# Patient Record
Sex: MALE | Race: WHITE | NOT HISPANIC OR LATINO | Employment: OTHER | URBAN - METROPOLITAN AREA
[De-identification: names, ages, dates, MRNs, and addresses within clinical notes are randomized per-mention and may not be internally consistent; named-entity substitution may affect disease eponyms.]

---

## 2017-03-09 ENCOUNTER — OFFICE VISIT (OUTPATIENT)
Dept: INTERNAL MEDICINE | Facility: CLINIC | Age: 62
End: 2017-03-09

## 2017-03-09 VITALS
BODY MASS INDEX: 30.92 KG/M2 | WEIGHT: 197 LBS | DIASTOLIC BLOOD PRESSURE: 68 MMHG | HEIGHT: 67 IN | SYSTOLIC BLOOD PRESSURE: 142 MMHG

## 2017-03-09 DIAGNOSIS — M65.342 TRIGGER RING FINGER OF LEFT HAND: Primary | ICD-10-CM

## 2017-03-09 LAB
BASOPHILS # BLD AUTO: 0.05 10*3/MM3 (ref 0–0.2)
BASOPHILS NFR BLD AUTO: 0.8 % (ref 0–1)
DEPRECATED RDW RBC AUTO: 42.1 FL (ref 37–54)
EOSINOPHIL # BLD AUTO: 0.08 10*3/MM3 (ref 0.1–0.3)
EOSINOPHIL NFR BLD AUTO: 1.3 % (ref 0–3)
ERYTHROCYTE [DISTWIDTH] IN BLOOD BY AUTOMATED COUNT: 12.4 % (ref 11.3–14.5)
HCT VFR BLD AUTO: 39 % (ref 38.9–50.9)
HGB BLD-MCNC: 13.5 G/DL (ref 13.1–17.5)
IMM GRANULOCYTES # BLD: 0.02 10*3/MM3 (ref 0–0.03)
IMM GRANULOCYTES NFR BLD: 0.3 % (ref 0–0.6)
LYMPHOCYTES # BLD AUTO: 2.11 10*3/MM3 (ref 0.6–4.8)
LYMPHOCYTES NFR BLD AUTO: 34.3 % (ref 24–44)
MCH RBC QN AUTO: 32.8 PG (ref 27–31)
MCHC RBC AUTO-ENTMCNC: 34.6 G/DL (ref 32–36)
MCV RBC AUTO: 94.9 FL (ref 80–99)
MONOCYTES # BLD AUTO: 0.57 10*3/MM3 (ref 0–1)
MONOCYTES NFR BLD AUTO: 9.3 % (ref 0–12)
NEUTROPHILS # BLD AUTO: 3.32 10*3/MM3 (ref 1.5–8.3)
NEUTROPHILS NFR BLD AUTO: 54 % (ref 41–71)
PLATELET # BLD AUTO: 244 10*3/MM3 (ref 150–450)
PMV BLD AUTO: 9.8 FL (ref 6–12)
RBC # BLD AUTO: 4.11 10*6/MM3 (ref 4.2–5.76)
WBC NRBC COR # BLD: 6.15 10*3/MM3 (ref 3.5–10.8)

## 2017-03-09 PROCEDURE — 85025 COMPLETE CBC W/AUTO DIFF WBC: CPT | Performed by: PHYSICIAN ASSISTANT

## 2017-03-09 PROCEDURE — 99213 OFFICE O/P EST LOW 20 MIN: CPT | Performed by: PHYSICIAN ASSISTANT

## 2017-03-09 NOTE — PROGRESS NOTES
"Chief Complaint   Patient presents with   • Trigger finger left ring finger ongoing       Subjective   Russell Maxwell Soto is a 62 y.o. male.       History of Present Illness     Pt has a trigger finger on his left hand that seems to be worsening over the past few months, has been present for years. He notes that it cleared up when he was on IV abx. Wonders if he has some sort of systemic infection. No fever, sweats, chills, dizziness, pain otherwise. Would like to pursue options other than surgery for treatment of his finger, if possible.    Never heard about results from his holter monitor, done after hospital admission last year.    Has f/u with urologist pending soon.      No current outpatient prescriptions on file.     PMFSH  The following portions of the patient's history were reviewed and updated as appropriate: allergies, current medications, past family history, past medical history, past social history, past surgical history and problem list.    Review of Systems   Constitutional: Negative for activity change, chills, diaphoresis, fatigue, fever and unexpected weight change.   HENT: Negative.    Eyes: Negative.    Respiratory: Negative for chest tightness, shortness of breath and wheezing.    Cardiovascular: Negative.    Gastrointestinal: Negative for abdominal pain.   Endocrine: Negative.    Genitourinary: Negative.    Musculoskeletal: Positive for arthralgias.   Skin: Negative for color change, rash and wound.   Allergic/Immunologic: Negative.    Neurological: Negative for dizziness, seizures, syncope and light-headedness.   Hematological: Negative for adenopathy. Does not bruise/bleed easily.   Psychiatric/Behavioral: Negative for confusion.       Objective   Visit Vitals   • /68   • Ht 67\" (170.2 cm)   • Wt 197 lb (89.4 kg)   • BMI 30.85 kg/m2       Physical Exam   Constitutional: He appears well-developed and well-nourished.   HENT:   Head: Normocephalic.   Right Ear: Hearing, tympanic " membrane, external ear and ear canal normal.   Left Ear: Hearing, tympanic membrane, external ear and ear canal normal.   Nose: Nose normal.   Mouth/Throat: Oropharynx is clear and moist.   Eyes: Conjunctivae are normal. Pupils are equal, round, and reactive to light.   Neck: Normal range of motion.   Cardiovascular: Normal rate, regular rhythm and normal heart sounds.    Pulmonary/Chest: Effort normal and breath sounds normal. He has no decreased breath sounds. He has no wheezes. He has no rhonchi. He has no rales.   Musculoskeletal:        Left hand: He exhibits decreased range of motion (incomplete flexion of left ring finger), tenderness (PIP jt of left ring finger) and swelling. He exhibits no deformity and no laceration.   Neurological: He is alert.   Skin: Skin is warm and dry.   Psychiatric: He has a normal mood and affect. His behavior is normal.   Nursing note and vitals reviewed.      Results for orders placed or performed in visit on 07/25/16   POCT Urinalysis Dipstick, Automated   Result Value Ref Range    Color Yellow Yellow, Straw, Dark Yellow, Leatha    Clarity, UA Clear Clear    Glucose, UA Negative Negative mg/dL    Bilirubin Negative Negative    Ketones, UA Negative Negative    Specific Gravity  1.010 1.005 - 1.030    Blood, UA Negative Negative    pH, Urine 8.0 5.0 - 8.0    Protein, POC Negative Negative mg/dL    Urobilinogen, UA Normal Normal    Leukocytes Negative Negative    Nitrite, UA Negative Negative        ASSESSMENT/PLAN    Problem List Items Addressed This Visit     None      Visit Diagnoses     Trigger ring finger of left hand    -  Primary    Relevant Orders    CBC & Differential    Ambulatory Referral to Physical Therapy Evaluate and treat    CBC Auto Differential               Return in about 4 months (around 7/9/2017) for Annual physical.

## 2017-06-20 ENCOUNTER — OFFICE VISIT (OUTPATIENT)
Dept: INTERNAL MEDICINE | Facility: CLINIC | Age: 62
End: 2017-06-20

## 2017-06-20 VITALS
HEART RATE: 71 BPM | BODY MASS INDEX: 29.13 KG/M2 | OXYGEN SATURATION: 95 % | DIASTOLIC BLOOD PRESSURE: 70 MMHG | SYSTOLIC BLOOD PRESSURE: 146 MMHG | TEMPERATURE: 99 F | WEIGHT: 186 LBS

## 2017-06-20 DIAGNOSIS — J20.9 ACUTE BRONCHITIS, UNSPECIFIED ORGANISM: Primary | ICD-10-CM

## 2017-06-20 LAB
EXPIRATION DATE: NORMAL
FLUAV AG NPH QL: NEGATIVE
FLUBV AG NPH QL: NEGATIVE
INTERNAL CONTROL: NORMAL
Lab: NORMAL

## 2017-06-20 PROCEDURE — 87804 INFLUENZA ASSAY W/OPTIC: CPT | Performed by: PHYSICIAN ASSISTANT

## 2017-06-20 PROCEDURE — 99213 OFFICE O/P EST LOW 20 MIN: CPT | Performed by: PHYSICIAN ASSISTANT

## 2017-06-20 RX ORDER — BENZONATATE 200 MG/1
200 CAPSULE ORAL 3 TIMES DAILY PRN
Qty: 30 CAPSULE | Refills: 0 | Status: SHIPPED | OUTPATIENT
Start: 2017-06-20 | End: 2017-06-30

## 2017-06-20 RX ORDER — AZITHROMYCIN 250 MG/1
TABLET, FILM COATED ORAL
Qty: 6 TABLET | Refills: 0 | Status: SHIPPED | OUTPATIENT
Start: 2017-06-20 | End: 2017-08-04

## 2017-06-20 NOTE — PROGRESS NOTES
Chief Complaint   Patient presents with   • Fever, cough, congestion x1 week       Subjective   Russell Maxwell Soto is a 62 y.o. male.       History of Present Illness     Started feeling bad a week ago with ear pain and then progressed to tickled in his throat. Developed unproductive cough that was persistent. Continued with his normal activities. Started checking his temp 3 days ago and it stayed around 100, tmax was 100.8. Taking tylenol and it is bring temp down. Currently he still has cough, sinus congestion, ear fullness, headaches and fatigue. No sick contacts.        Current Outpatient Prescriptions:   •  azithromycin (ZITHROMAX Z-BASIL) 250 MG tablet, Take 2 tablets the first day, then 1 tablet daily for 4 days., Disp: 6 tablet, Rfl: 0  •  benzonatate (TESSALON) 200 MG capsule, Take 1 capsule by mouth 3 (Three) Times a Day As Needed for Cough for up to 10 days., Disp: 30 capsule, Rfl: 0     PMFSH  The following portions of the patient's history were reviewed and updated as appropriate: allergies, current medications, past family history, past medical history, past social history, past surgical history and problem list.    Review of Systems   Constitutional: Positive for fatigue. Negative for activity change and unexpected weight change.   HENT: Positive for congestion, postnasal drip and sore throat. Negative for ear pain.    Eyes: Negative for pain and discharge.   Respiratory: Positive for cough. Negative for chest tightness, shortness of breath and wheezing.    Cardiovascular: Negative for chest pain and palpitations.   Gastrointestinal: Negative for abdominal pain, diarrhea and vomiting.   Endocrine: Negative.    Genitourinary: Negative.    Musculoskeletal: Negative for joint swelling.   Skin: Negative for color change, rash and wound.   Allergic/Immunologic: Negative.    Neurological: Negative for seizures and syncope.   Psychiatric/Behavioral: Negative.        Objective   /70  Pulse 71  Temp 99  °F (37.2 °C)  Wt 186 lb (84.4 kg)  SpO2 95%  BMI 29.13 kg/m2    Physical Exam   Constitutional: He is oriented to person, place, and time. He appears well-developed and well-nourished.  Non-toxic appearance. No distress.   HENT:   Head: Normocephalic and atraumatic. Hair is normal.   Right Ear: External ear normal. No drainage, swelling or tenderness. Tympanic membrane is bulging.   Left Ear: External ear normal. No drainage, swelling or tenderness. Tympanic membrane is bulging.   Nose: Mucosal edema present. No epistaxis.   Mouth/Throat: Uvula is midline and mucous membranes are normal. No oral lesions. No uvula swelling. Posterior oropharyngeal erythema present. No oropharyngeal exudate.   Eyes: Conjunctivae and EOM are normal. Pupils are equal, round, and reactive to light. Right eye exhibits no discharge. Left eye exhibits no discharge. No scleral icterus.   Neck: Normal range of motion. Neck supple.   Cardiovascular: Normal rate, regular rhythm and normal heart sounds.  Exam reveals no gallop.    No murmur heard.  Pulmonary/Chest: Breath sounds normal. No stridor. No respiratory distress. He has no wheezes. He has no rales. He exhibits no tenderness.   Abdominal: Soft. There is no tenderness.   Lymphadenopathy:     He has cervical adenopathy.   Neurological: He is alert and oriented to person, place, and time. He exhibits normal muscle tone.   Skin: Skin is warm and dry. No rash noted. He is not diaphoretic.   Psychiatric: He has a normal mood and affect. His behavior is normal. Judgment and thought content normal.   Nursing note and vitals reviewed.      Results for orders placed or performed in visit on 06/20/17   POCT Influenza A/B   Result Value Ref Range    Rapid Influenza A Ag negative     Rapid Influenza B Ag negative     Internal Control Passed Passed    Lot Number [id number]     Expiration Date 02/01/2019         ASSESSMENT/PLAN    Problem List Items Addressed This Visit     None      Visit Diagnoses      Acute bronchitis, unspecified organism    -  Primary    Take z-pack as directed. Use tessalon perles prn. Increase rest and fluids. RTC if worsening or no better.    Relevant Medications    azithromycin (ZITHROMAX Z-BASIL) 250 MG tablet    benzonatate (TESSALON) 200 MG capsule    Other Relevant Orders    POCT Influenza A/B (Completed)               Return in about 3 months (around 9/20/2017) for Annual physical.

## 2017-08-04 ENCOUNTER — OFFICE VISIT (OUTPATIENT)
Dept: INTERNAL MEDICINE | Facility: CLINIC | Age: 62
End: 2017-08-04

## 2017-08-04 VITALS
DIASTOLIC BLOOD PRESSURE: 68 MMHG | HEART RATE: 53 BPM | BODY MASS INDEX: 28.98 KG/M2 | OXYGEN SATURATION: 98 % | WEIGHT: 185 LBS | SYSTOLIC BLOOD PRESSURE: 142 MMHG

## 2017-08-04 DIAGNOSIS — M54.42 ACUTE BILATERAL LOW BACK PAIN WITH BILATERAL SCIATICA: Primary | ICD-10-CM

## 2017-08-04 DIAGNOSIS — M54.41 ACUTE BILATERAL LOW BACK PAIN WITH BILATERAL SCIATICA: Primary | ICD-10-CM

## 2017-08-04 DIAGNOSIS — Z00.00 HEALTHCARE MAINTENANCE: ICD-10-CM

## 2017-08-04 LAB
25(OH)D3 SERPL-MCNC: 12.3 NG/ML
ALBUMIN SERPL-MCNC: 4.5 G/DL (ref 3.2–4.8)
ALBUMIN/GLOB SERPL: 1.7 G/DL (ref 1.5–2.5)
ALP SERPL-CCNC: 77 U/L (ref 25–100)
ALT SERPL W P-5'-P-CCNC: 22 U/L (ref 7–40)
ANION GAP SERPL CALCULATED.3IONS-SCNC: 10 MMOL/L (ref 3–11)
ARTICHOKE IGE QN: 147 MG/DL (ref 0–130)
AST SERPL-CCNC: 23 U/L (ref 0–33)
BILIRUB SERPL-MCNC: 0.7 MG/DL (ref 0.3–1.2)
BUN BLD-MCNC: 11 MG/DL (ref 9–23)
BUN/CREAT SERPL: 15.7 (ref 7–25)
CALCIUM SPEC-SCNC: 9.6 MG/DL (ref 8.7–10.4)
CHLORIDE SERPL-SCNC: 103 MMOL/L (ref 99–109)
CHOLEST SERPL-MCNC: 242 MG/DL (ref 0–200)
CO2 SERPL-SCNC: 26 MMOL/L (ref 20–31)
CREAT BLD-MCNC: 0.7 MG/DL (ref 0.6–1.3)
DEPRECATED RDW RBC AUTO: 41.9 FL (ref 37–54)
ERYTHROCYTE [DISTWIDTH] IN BLOOD BY AUTOMATED COUNT: 12.4 % (ref 11.3–14.5)
GFR SERPL CREATININE-BSD FRML MDRD: 114 ML/MIN/1.73
GLOBULIN UR ELPH-MCNC: 2.7 GM/DL
GLUCOSE BLD-MCNC: 111 MG/DL (ref 70–100)
HCT VFR BLD AUTO: 39.2 % (ref 38.9–50.9)
HCV AB SER DONR QL: NORMAL
HDLC SERPL-MCNC: 62 MG/DL (ref 40–60)
HGB BLD-MCNC: 13.7 G/DL (ref 13.1–17.5)
MCH RBC QN AUTO: 32.5 PG (ref 27–31)
MCHC RBC AUTO-ENTMCNC: 34.9 G/DL (ref 32–36)
MCV RBC AUTO: 93.1 FL (ref 80–99)
PLATELET # BLD AUTO: 239 10*3/MM3 (ref 150–450)
PMV BLD AUTO: 9.4 FL (ref 6–12)
POTASSIUM BLD-SCNC: 4.3 MMOL/L (ref 3.5–5.5)
PROT SERPL-MCNC: 7.2 G/DL (ref 5.7–8.2)
PSA SERPL-MCNC: 0.12 NG/ML (ref 0–4)
RBC # BLD AUTO: 4.21 10*6/MM3 (ref 4.2–5.76)
SODIUM BLD-SCNC: 139 MMOL/L (ref 132–146)
TRIGL SERPL-MCNC: 314 MG/DL (ref 0–150)
TSH SERPL DL<=0.05 MIU/L-ACNC: 2.1 MIU/ML (ref 0.35–5.35)
WBC NRBC COR # BLD: 5.32 10*3/MM3 (ref 3.5–10.8)

## 2017-08-04 PROCEDURE — 86803 HEPATITIS C AB TEST: CPT | Performed by: PHYSICIAN ASSISTANT

## 2017-08-04 PROCEDURE — 85027 COMPLETE CBC AUTOMATED: CPT | Performed by: PHYSICIAN ASSISTANT

## 2017-08-04 PROCEDURE — 80053 COMPREHEN METABOLIC PANEL: CPT | Performed by: PHYSICIAN ASSISTANT

## 2017-08-04 PROCEDURE — 82306 VITAMIN D 25 HYDROXY: CPT | Performed by: PHYSICIAN ASSISTANT

## 2017-08-04 PROCEDURE — 84443 ASSAY THYROID STIM HORMONE: CPT | Performed by: PHYSICIAN ASSISTANT

## 2017-08-04 PROCEDURE — 99213 OFFICE O/P EST LOW 20 MIN: CPT | Performed by: PHYSICIAN ASSISTANT

## 2017-08-04 PROCEDURE — 80061 LIPID PANEL: CPT | Performed by: PHYSICIAN ASSISTANT

## 2017-08-04 PROCEDURE — 84153 ASSAY OF PSA TOTAL: CPT | Performed by: PHYSICIAN ASSISTANT

## 2017-08-04 RX ORDER — IBUPROFEN 400 MG/1
400 TABLET ORAL 3 TIMES DAILY
COMMUNITY
End: 2017-11-30

## 2017-08-04 NOTE — PROGRESS NOTES
"Chief Complaint   Patient presents with   • Back pain x2 months     pt is fasting wants labwork prior to PE in 2 weeks       Subjective   Russell Soto is a 62 y.o. male.       History of Present Illness     In the beginning of June pt did some yard work that required some bending and pulling. He developed some low back pain, he let that subside and then was able to go back and do some more work. The pain is an \"electric\" kind of pain that goes between his buttocks and legs. Shifts back and forth between right and left. Usually is in the left side. Last time he did yardwork was in July. Since then he has been taking ibuprofen 400 mg prn but feels like his discomfort is getting worse. Can be triggered by even slight shift of posture- different every time. Can find a comfortable position and stay in the same spot without pain. Pain has radiated down his leg in the past, less now. No numbness or tingling.       Current Outpatient Prescriptions:   •  ibuprofen (ADVIL,MOTRIN) 400 MG tablet, Take 400 mg by mouth Every 6 (Six) Hours As Needed for Mild Pain (1-3)., Disp: , Rfl:      PMFSH  The following portions of the patient's history were reviewed and updated as appropriate: allergies, current medications, past family history, past medical history, past social history, past surgical history and problem list.    Review of Systems   Constitutional: Negative for appetite change, fever and unexpected weight change.   HENT: Negative.    Eyes: Negative for pain and visual disturbance.   Respiratory: Negative for chest tightness, shortness of breath and wheezing.    Cardiovascular: Negative for chest pain and palpitations.   Gastrointestinal: Negative for abdominal pain, blood in stool, diarrhea, nausea and vomiting.   Endocrine: Negative.    Genitourinary: Negative for difficulty urinating, flank pain, frequency and urgency.   Musculoskeletal: Positive for back pain. Negative for joint swelling.   Skin: Negative for " color change and rash.   Neurological: Negative for tremors and weakness.   Hematological: Negative for adenopathy.   Psychiatric/Behavioral: Negative for confusion and decreased concentration.   All other systems reviewed and are negative.      Objective   /68  Pulse 53  Wt 185 lb (83.9 kg)  SpO2 98%  BMI 28.98 kg/m2    Physical Exam   Constitutional: He is oriented to person, place, and time. He appears well-developed and well-nourished. No distress.   HENT:   Head: Normocephalic and atraumatic.   Eyes: Conjunctivae and EOM are normal. Pupils are equal, round, and reactive to light. No scleral icterus.   Neck: Normal range of motion. Neck supple.   Cardiovascular: Normal rate, regular rhythm and normal heart sounds.  Exam reveals no gallop.    No murmur heard.  Pulmonary/Chest: Effort normal and breath sounds normal. No respiratory distress. He has no wheezes. He has no rales. He exhibits no tenderness.   Abdominal: Soft. There is no tenderness.   Musculoskeletal: He exhibits tenderness. He exhibits no deformity.   Neurological: He is alert and oriented to person, place, and time. He has normal reflexes. He displays no atrophy, no tremor and normal reflexes. No sensory deficit. He exhibits normal muscle tone. Coordination normal.   Reflex Scores:       Patellar reflexes are 2+ on the right side and 2+ on the left side.       Achilles reflexes are 2+ on the right side and 2+ on the left side.  Skin: Skin is warm and dry. No rash noted. He is not diaphoretic.   Psychiatric: He has a normal mood and affect. His behavior is normal. Judgment and thought content normal.   Nursing note and vitals reviewed.           ASSESSMENT/PLAN    Problem List Items Addressed This Visit        Other    Acute bilateral low back pain with bilateral sciatica - Primary     Refer for PT. Start Ibuprofen 400 mg po BID for 5-7 days.          Relevant Orders    Ambulatory Referral to Physical Therapy Evaluate and treat      Other  Visit Diagnoses     Healthcare maintenance        Relevant Orders    Lipid Panel    Comprehensive Metabolic Panel    CBC (No Diff)    TSH    Vitamin D 25 Hydroxy    Hepatitis C Antibody    PSA               Return for Next scheduled follow up.

## 2017-08-08 ENCOUNTER — HOSPITAL ENCOUNTER (OUTPATIENT)
Dept: PHYSICAL THERAPY | Facility: HOSPITAL | Age: 62
Setting detail: THERAPIES SERIES
Discharge: HOME | End: 2017-08-08

## 2017-08-08 DIAGNOSIS — M54.42 ACUTE LEFT-SIDED LOW BACK PAIN WITH LEFT-SIDED SCIATICA: Primary | ICD-10-CM

## 2017-08-08 PROCEDURE — 97162 PT EVAL MOD COMPLEX 30 MIN: CPT | Performed by: PHYSICAL THERAPIST

## 2017-08-08 NOTE — THERAPY EVALUATION
Outpatient Physical Therapy Ortho Initial Evaluation  [facility]     Patient Name: Russell Soto  : 1955  MRN: [id number]  Today's Date: 2017      Visit Date: 2017    Patient Active Problem List   Diagnosis   • Sinus bradycardia   • BPH (benign prostatic hypertrophy)   • Anemia   • Palpitations   • Health care maintenance   • Hyperlipidemia   • Acute bilateral low back pain with bilateral sciatica        Past Medical History:   Diagnosis Date   • Dizziness    • Rectal hemorrhage         Past Surgical History:   Procedure Laterality Date   • PROSTATE SURGERY  2016       Visit Dx:     ICD-10-CM ICD-9-CM   1. Acute left-sided low back pain with left-sided sciatica M54.42 724.2     724.3             Patient History       17 1300          History    Chief Complaint Difficulty with daily activities;Pain;Muscle weakness  -RB      Type of Pain Back pain;Lower Extremity / Leg  -RB      Brief Description of Current Complaint Pt developed bilateral low back and BLE pn in  of this year, 1 day after doing yardwork. Pn initially across the low back bilaterally and radiating into BLE. Pn has since improved somewhat. Now intermittent, present mostly in the L low back and only occasionally radiating down the LLE. Pn seems random, but does tend to increased when shifting his weight from the R leg to the L and when changing positions. He is restless and has difficulty finding comfortable positions, but does state that laying on his R side w/ knees bent gives him some relief.   -RB      Previous treatment for THIS PROBLEM Medication  -RB      Onset Date- PT 2017  -RB      Patient/Caregiver Goals Relieve pain;Return to prior level of function;Improve mobility;Know what to do to help the symptoms  -RB      Current Tobacco Use None  -RB      Patient's Rating of General Health Very good  -RB      Hand Dominance right-handed  -RB      Occupation/sports/leisure activities Retired. Enjoys yardwork  but has avoided 2/2 concern of worsening injury.  -RB      Patient seeing anyone else for problem(s)? Yes  -RB      How has patient tried to help current problem? Rest  -RB      What clinical tests have you had for this problem? --   None  -RB      Pain     Pain Location Back  -RB      Pain at Present 0  -RB      Pain at Best 0  -RB      Pain at Worst 9  -RB      Pain Frequency Intermittent  -RB      Pain Description Shooting;Other (Comment)   electric  -RB      What Performance Factors Make the Current Problem(s) WORSE? Weightshifting, walking  -RB      What Performance Factors Make the Current Problem(s) BETTER? Laying on R side  -RB      Tolerance Time- Standing 30 min  -RB      Tolerance Time- Walking 5-10 min  -RB      What position do you sleep in? Right sidelying  -RB      Fall Risk Assessment    Any falls in the past year: No  -RB      Daily Activities    Primary Language English  -RB      How does patient learn best? Reading  -RB      Teaching needs identified Home Exercise Program;Management of Condition  -RB      Patient is concerned about/has problems with Climbing Stairs;Difficulty with self care (i.e. bathing, dressing, toileting:;Grasping objects lifting;Performing home management (household chores, shopping, care of dependents);Performing job responsibilities/community activities (work, school,;Standing;Walking  -RB      Does patient have problems with the following? None  -RB      Barriers to learning None  -RB      Pt Participated in POC and Goals Yes  -RB      Safety    Are you being hurt, hit, or frightened by anyone at home or in your life? No  -RB      Are you being neglected by a caregiver No  -RB        User Key  (r) = Recorded By, (t) = Taken By, (c) = Cosigned By    Initials Name Provider Type    RB Daniela Gregory, PT Physical Therapist                PT Ortho       08/08/17 1300    Subjective Comments    Subjective Comments Presents w/ 2 month hx of L low back, LLE pn.  -RB     Posture/Observations    Posture- WNL Posture is WNL  -RB    Posture/Observations Comments Ambulates w/ slight R lateral lean. Demo posterior rotation L inominate in supine, equal leg length.  -RB    Quarter Clearing    Quarter Clearing Lower Quarter Clearing  -RB    DTR- Lower Quarter Clearing    Patellar tendon (L2-4) Bilateral:;1- Minimal response  -RB    Achilles tendon (S1-2) Bilateral:;1- Minimal response  -RB    Neural Tension Signs- Lower Quarter Clearing    Slump Negative  -RB    SLR Negative  -RB    Sensory Screen for Light Touch- Lower Quarter Clearing    L1 (inguinal area) Intact  -RB    L2 (anterior mid thigh) Intact  -RB    L3 (distal anterior thigh) Intact  -RB    L4 (medial lower leg/foot) Intact  -RB    L5 (lateral lower leg/great toe) Intact  -RB    S1 (bottom of foot) Intact  -RB    Myotomal Screen- Lower Quarter Clearing    Hip flexion (L2) Bilateral:;4+ (Good +)   pn L lumbar w/ resisted R hip flxn  -RB    Knee extension (L3) Bilateral:;5 (Normal)  -RB    Ankle DF (L4) Bilateral:;5 (Normal)  -RB    Great toe extension (L5) Bilateral:;5 (Normal)  -RB    Ankle PF (S1) Bilateral:;5 (Normal)  -RB    Knee flexion (S2) Bilateral:;5 (Normal)  -RB    Lumbar ROM Screen- Lower Quarter Clearing    Lumbar Flexion Normal   no change  -RB    Lumbar Extension Impaired   50%, uncomfortable  -RB    Lumbar Lateral Flexion Normal  -RB    Lumbar Rotation Normal  -RB    SI/Hip Screen- Lower Quarter Clearing    Gaenslen's test Left:;Positive  -RB    ASIS compression Left:;Positive  -RB    ASIS distraction Left:;Positive  -RB    La's/Harry's test Negative  -RB    Special Tests/Palpation    Special Tests/Palpation Lumbar/SI  -RB    Lumbosacral Palpation    SI Left:;Tender  -RB    Lumbosacral Segment Left:;Tender  -RB    Piriformis Left:;Tender  -RB    Lumbosacral Palpation? Yes  -RB    Lumbar/SI Special Tests    FAIR Test (Piriformis Syndrome) Negative  -RB    ROM (Range of Motion)    General ROM Detail B hip AROM  WFL and pn free  -RB    Flexibility    Flexibility Tested? Lower Extremity  -RB    Lower Extremity Flexibility    Hamstrings Bilateral:;Mildly limited  -RB    ITB Bilateral:;WNL  -RB    Hip External Rotators Bilateral:;WNL  -RB      User Key  (r) = Recorded By, (t) = Taken By, (c) = Cosigned By    Initials Name Provider Type    GAEL Gregory, PT Physical Therapist                            Therapy Education       08/08/17 1737          Therapy Education    Given HEP   Issued MET for inominate rotation, adductor squeezes  -RB      Program New  -RB      How Provided Verbal;Demonstration;Written  -RB      Provided to Patient  -RB      Level of Understanding Teach back education performed  -RB        User Key  (r) = Recorded By, (t) = Taken By, (c) = Cosigned By    Initials Name Provider Type    GAEL Gregory, PT Physical Therapist                PT OP Goals       08/08/17 1700       PT Short Term Goals    STG Date to Achieve 08/29/17  -RB     STG 1 Pt to be compliant w/ initial HEP for strengthening and flexibility.  -RB     STG 1 Progress New  -RB     STG 2 Pt to report at least a 25% reduction in pn  -RB     STG 2 Progress New  -RB     STG 3 Pt to report no return of LE symptoms w/ daily activities.  -RB     STG 3 Progress New  -RB     Long Term Goals    LTG Date to Achieve 09/19/17  -RB     LTG 1 Pt to be independent w/ long term HEP for strengthening, flexibility, and symptom mgmt  -RB     LTG 1 Progress New  -RB     LTG 2 Pt to report at least a 50% reduction in pn  -RB     LTG 2 Progress New  -RB     LTG 3 Pt to demo functional trunk ROM in all planes w/o increase in pn.  -RB     LTG 3 Progress New  -RB     LTG 4 Pt to improve Mod Oswestry score by at least 8 pts to reflect improved pn and function.  -RB     LTG 4 Progress New  -RB     Time Calculation    PT Goal Re-Cert Due Date 09/05/17  -RB       User Key  (r) = Recorded By, (t) = Taken By, (c) = Cosigned By    Initials Name Provider Type    GAEL Suarez  MARY Gregory PT Physical Therapist                PT Assessment/Plan       08/08/17 0766       PT Assessment    Functional Limitations Impaired locomotion;Limitation in home management;Limitations in functional capacity and performance;Performance in leisure activities;Performance in self-care ADL  -RB     Impairments Muscle strength;Pain;Impaired postural alignment;Range of motion  -RB     Assessment Comments Pt presents w/ evolving symptoms of L SI joint dysfunction. He demonstrates deficits in B hip strength, trunk ROM, and pelvic alignment limiting his tolerance to daily activities. LE symptoms not reproduced w/ neural tension testing, repeated movements, or lumbar mobilization. However SI provocation tests elicited lumbosacral pn. He would benefit from skilled PT services to address deficits, decrease pn, and allow return to PLOF.  -RB     Please refer to paper survey for additional self-reported information Yes  -RB     Rehab Potential Good  -RB     Patient/caregiver participated in establishment of treatment plan and goals Yes  -RB     Patient would benefit from skilled therapy intervention Yes  -RB     PT Plan    PT Frequency 2x/week  -RB     Predicted Duration of Therapy Intervention (days/wks) 4 wks  -RB     Planned CPT's? PT EVAL MOD COMPLELITY: 42392;PT RE-EVAL: 51178;PT THER PROC EA 15 MIN: 41952;PT MANUAL THERAPY EA 15 MIN: 31266;PT NEUROMUSC RE-EDUCATION EA 15 MIN: 51350;PT ULTRASOUND EA 15 MIN: 20271;PT ELECTRICAL STIM UNATTEND: ;PT HOT/COLD PACK WC NONMCARE: 07594;PT TRACTION LUMBAR: 28096  -RB     PT Plan Comments PT POC to include hip/core strengthening, manual techniques/MET to improve postural alignment, modalities as indicated for pn control.  -RB       User Key  (r) = Recorded By, (t) = Taken By, (c) = Cosigned By    Initials Name Provider Type    RB Daniela Gregory, PT Physical Therapist                  Exercises       08/08/17 1300          Subjective Comments    Subjective Comments  Presents w/ 2 month hx of L low back, LLE pn.  -RB        User Key  (r) = Recorded By, (t) = Taken By, (c) = Cosigned By    Initials Name Provider Type    GAEL Gregory, PT Physical Therapist                              Outcome Measures       08/08/17 1700          Modified Oswestry    Modified Oswestry Score/Comments 22/50  -RB      Functional Assessment    Outcome Measure Options Modifed Owestry  -RB        User Key  (r) = Recorded By, (t) = Taken By, (c) = Cosigned By    Initials Name Provider Type    GAEL Gregory, PT Physical Therapist            Time Calculation:   Start Time: 1345     Therapy Charges for Today     Code Description Service Date Service Provider Modifiers Qty    28591397149 HC PT EVAL MOD COMPLEXITY 3 8/8/2017 Daniela Gregory, PT GP 1          PT G-Codes  Outcome Measure Options: Modifed Owestry         Daniela Gregory, PT  8/8/2017

## 2017-08-15 ENCOUNTER — OFFICE VISIT (OUTPATIENT)
Dept: INTERNAL MEDICINE | Facility: CLINIC | Age: 62
End: 2017-08-15

## 2017-08-15 VITALS
SYSTOLIC BLOOD PRESSURE: 148 MMHG | OXYGEN SATURATION: 98 % | BODY MASS INDEX: 29.44 KG/M2 | DIASTOLIC BLOOD PRESSURE: 68 MMHG | WEIGHT: 188 LBS | HEART RATE: 54 BPM

## 2017-08-15 DIAGNOSIS — R73.9 HYPERGLYCEMIA: ICD-10-CM

## 2017-08-15 DIAGNOSIS — Z00.00 HEALTH CARE MAINTENANCE: ICD-10-CM

## 2017-08-15 DIAGNOSIS — M54.41 ACUTE BILATERAL LOW BACK PAIN WITH BILATERAL SCIATICA: ICD-10-CM

## 2017-08-15 DIAGNOSIS — M54.42 ACUTE BILATERAL LOW BACK PAIN WITH BILATERAL SCIATICA: ICD-10-CM

## 2017-08-15 DIAGNOSIS — Z00.00 HEALTHCARE MAINTENANCE: Primary | ICD-10-CM

## 2017-08-15 LAB
BILIRUB BLD-MCNC: NEGATIVE MG/DL
CLARITY, POC: CLEAR
COLOR UR: YELLOW
GLUCOSE UR STRIP-MCNC: NEGATIVE MG/DL
HBA1C MFR BLD: 5.1 %
KETONES UR QL: NEGATIVE
LEUKOCYTE EST, POC: NEGATIVE
NITRITE UR-MCNC: NEGATIVE MG/ML
PH UR: 6 [PH] (ref 5–8)
PROT UR STRIP-MCNC: NEGATIVE MG/DL
RBC # UR STRIP: NEGATIVE /UL
SP GR UR: 1.01 (ref 1–1.03)
UROBILINOGEN UR QL: NORMAL

## 2017-08-15 PROCEDURE — 81003 URINALYSIS AUTO W/O SCOPE: CPT | Performed by: PHYSICIAN ASSISTANT

## 2017-08-15 PROCEDURE — 83036 HEMOGLOBIN GLYCOSYLATED A1C: CPT | Performed by: PHYSICIAN ASSISTANT

## 2017-08-15 PROCEDURE — 99396 PREV VISIT EST AGE 40-64: CPT | Performed by: PHYSICIAN ASSISTANT

## 2017-08-15 RX ORDER — CYCLOBENZAPRINE HCL 5 MG
5 TABLET ORAL 3 TIMES DAILY PRN
Qty: 20 TABLET | Refills: 0 | Status: SHIPPED | OUTPATIENT
Start: 2017-08-15 | End: 2017-09-08 | Stop reason: ALTCHOICE

## 2017-08-15 NOTE — PROGRESS NOTES
"Chief Complaint   Patient presents with   • Annual Exam       Subjective   Russell Soto is a 62 y.o. male.       History of Present Illness     The patient is being seen for a health maintenance evaluation.  The last health maintenance was 1 year(s) ago.    Social History  Russell  does not smoke cigarettes.   He drinks frequent alcohol. 1-2 glasses of wine a night  He does not use illicit drugs.    General History  Russell  does have regular dental visits.  He does complain of vision problems. Wears reading glasses. Last eye exam was unknown.  Immunizations are up to date. The patient needs the following immunizations: UTD    Lifestyle  Russell  consumes in general, a \"healthy\" diet  .  He exercises intermittently.    Reproductive Health  Russell  is not sexually active. His contraceptive plan is no method.   He does not have erectile dysfunction.     Screening  Last PSA was 8/7/2017.  Last prostate exam was  4/2016.  Last testicular exam was 4/2016.  Last colonoscopy was 2012 by Merry.      Pt is aware of borderline elevated cholesterol and elevated BP. Resistant to taking any medications.    Since last visit pt saw PT and was given home exercises to start, will also have follow up visits for therapy.               Current Outpatient Prescriptions:   •  ibuprofen (ADVIL,MOTRIN) 400 MG tablet, Take 400 mg by mouth Every 6 (Six) Hours As Needed for Mild Pain (1-3)., Disp: , Rfl:   •  cyclobenzaprine (FLEXERIL) 5 MG tablet, Take 1 tablet by mouth 3 (Three) Times a Day As Needed for Muscle Spasms., Disp: 20 tablet, Rfl: 0     PMFSH  The following portions of the patient's history were reviewed and updated as appropriate: allergies, current medications, past family history, past medical history, past social history, past surgical history and problem list.    Review of Systems   Constitutional: Negative for appetite change, fever and unexpected weight change.   HENT: Negative for ear pain, facial swelling and " sore throat.    Eyes: Negative for pain and visual disturbance.   Respiratory: Negative for chest tightness, shortness of breath and wheezing.    Cardiovascular: Negative for chest pain and palpitations.   Gastrointestinal: Negative for abdominal pain and blood in stool.   Endocrine: Negative.    Genitourinary: Negative for difficulty urinating and hematuria.   Musculoskeletal: Negative for joint swelling.   Neurological: Negative for dizziness, tremors, seizures, syncope and light-headedness.   Hematological: Negative for adenopathy.   Psychiatric/Behavioral: Negative.        Objective   /68  Pulse 54  Wt 188 lb (85.3 kg)  SpO2 98%  BMI 29.44 kg/m2    Physical Exam   Constitutional: He is oriented to person, place, and time. He appears well-developed and well-nourished. No distress.   HENT:   Head: Normocephalic and atraumatic. Hair is normal.   Right Ear: Hearing, tympanic membrane, external ear and ear canal normal.   Left Ear: Hearing, tympanic membrane, external ear and ear canal normal.   Nose: No sinus tenderness or nasal deformity.   Mouth/Throat: Uvula is midline, oropharynx is clear and moist and mucous membranes are normal. No oral lesions. No uvula swelling.   Eyes: Conjunctivae, EOM and lids are normal. Pupils are equal, round, and reactive to light. Right eye exhibits no discharge. Left eye exhibits no discharge. No scleral icterus. Right eye exhibits normal extraocular motion and no nystagmus. Left eye exhibits normal extraocular motion and no nystagmus.   Fundoscopic exam:       The right eye shows red reflex.        The left eye shows red reflex.   Neck: Normal range of motion. Neck supple. No JVD present. No tracheal deviation present. No thyromegaly present.   Cardiovascular: Normal rate, regular rhythm, normal heart sounds, intact distal pulses and normal pulses.  Exam reveals no gallop.    No murmur heard.  Pulmonary/Chest: Effort normal and breath sounds normal. No respiratory  distress. He has no wheezes. He has no rales. He exhibits no tenderness.   Abdominal: Soft. Bowel sounds are normal. He exhibits no distension and no mass. There is no tenderness. There is no guarding. No hernia.   Genitourinary:   Genitourinary Comments:  exam deferred to urologist   Musculoskeletal: Normal range of motion. He exhibits no edema, tenderness or deformity.   Lymphadenopathy:     He has no cervical adenopathy.   Neurological: He is alert and oriented to person, place, and time. He has normal reflexes. He displays normal reflexes. No cranial nerve deficit. He exhibits normal muscle tone. Coordination normal.   Skin: Skin is warm and dry. No rash noted. He is not diaphoretic.   Psychiatric: He has a normal mood and affect. His behavior is normal. Judgment and thought content normal.   Nursing note and vitals reviewed.      Results for orders placed or performed in visit on 08/15/17   POCT urinalysis dipstick, automated   Result Value Ref Range    Color Yellow Yellow, Straw, Dark Yellow, Leatha    Clarity, UA Clear Clear    Glucose, UA Negative Negative, 1000 mg/dL (3+) mg/dL    Bilirubin Negative Negative    Ketones, UA Negative Negative    Specific Gravity  1.010 1.005 - 1.030    Blood, UA Negative Negative    pH, Urine 6.0 5.0 - 8.0    Protein, POC Negative Negative mg/dL    Urobilinogen, UA Normal Normal    Leukocytes Negative Negative    Nitrite, UA Negative Negative   POC Glycosylated Hemoglobin (Hb A1C)   Result Value Ref Range    Hemoglobin A1C 5.1 %        ASSESSMENT/PLAN    Problem List Items Addressed This Visit        Other    Health care maintenance     Immunizations: pt declines zostavax vaccine; TDaP done 2016  Eye exam: recommended  Prostate exam: done 4/2016, managed by Dr Da Silva, Urologist  PSA: done 8/17, reviewed with pt.  Colonoscopy: done 2012 by Dr Sousa; recommended rpt 5 years but pt declines this year  Labs: fasting labs reviewed         Acute bilateral low back pain with  bilateral sciatica     Continue with PT and prn ibuprofen. Add flexeril 5 mg to use prn.         Relevant Medications    cyclobenzaprine (FLEXERIL) 5 MG tablet      Other Visit Diagnoses     Healthcare maintenance    -  Primary    Relevant Orders    POCT urinalysis dipstick, automated (Completed)    Hyperglycemia        Relevant Orders    POC Glycosylated Hemoglobin (Hb A1C) (Completed)               Return in about 1 year (around 8/15/2018) for welcome to medicare.

## 2017-08-16 ENCOUNTER — HOSPITAL ENCOUNTER (OUTPATIENT)
Dept: PHYSICAL THERAPY | Facility: HOSPITAL | Age: 62
Setting detail: THERAPIES SERIES
Discharge: HOME | End: 2017-08-16

## 2017-08-16 DIAGNOSIS — M54.42 ACUTE LEFT-SIDED LOW BACK PAIN WITH LEFT-SIDED SCIATICA: Primary | ICD-10-CM

## 2017-08-16 PROCEDURE — 97110 THERAPEUTIC EXERCISES: CPT | Performed by: PHYSICAL THERAPIST

## 2017-08-16 NOTE — ASSESSMENT & PLAN NOTE
Immunizations: pt declines zostavax vaccine; TDaP done 2016  Eye exam: recommended  Prostate exam: done 4/2016, managed by Dr Da Silva, Urologist  PSA: done 8/17, reviewed with pt.  Colonoscopy: done 2012 by Dr Sousa; recommended rpt 5 years but pt declines this year  Labs: fasting labs reviewed

## 2017-08-16 NOTE — THERAPY TREATMENT NOTE
"    Outpatient Physical Therapy Ortho Treatment Note  [facility]     Patient Name: Russell Soto  : 1955  MRN: [id number]  Today's Date: 2017      Visit Date: 2017    Visit Dx:    ICD-10-CM ICD-9-CM   1. Acute left-sided low back pain with left-sided sciatica M54.42 724.2     724.3       Patient Active Problem List   Diagnosis   • Sinus bradycardia   • BPH (benign prostatic hypertrophy)   • Anemia   • Palpitations   • Health care maintenance   • Hyperlipidemia   • Acute bilateral low back pain with bilateral sciatica        Past Medical History:   Diagnosis Date   • Dizziness    • Rectal hemorrhage         Past Surgical History:   Procedure Laterality Date   • PROSTATE SURGERY                               PT Assessment/Plan       17 1000       PT Assessment    Assessment Comments Increase in symptom intensity and distribution since initial evaluation.  Symptoms variability suggests instability/derangement.  -JOSHUA     PT Plan    PT Plan Comments Assess response to extension exercises and proceed as indicated.  -JOSHUA       User Key  (r) = Recorded By, (t) = Taken By, (c) = Cosigned By    Initials Name Provider Type    JOSHUA Segura, PT Physical Therapist                    Exercises       17 1000          Subjective Comments    Subjective Comments Pt. reports change in symptoms.  He says that last week, symptoms were localized to left low back and LLE.  Now he reports that pain can be on either side and in either leg.  He describes symptoms as \"electric\" or \"burning\".  Pain is into both buttocks and randomly into each leg.  Pt. says he stopped taking Ibuprofen to see how he would feel without medication.  Pt. bent over to feed his dog this morning, and could hardly straighten back up.  -JOSHUA      Subjective Pain    Able to rate subjective pain? yes  -JOSHUA      Pre-Treatment Pain Level 7  -JOSHUA      Post-Treatment Pain Level 5  -JOSHUA      Subjective Pain Comment --   After " treatment,pn is in central low back and slightly right  -JOSHUA      Exercise 1    Exercise Name 1 Pt. is stooped forward with PPT and flattened lordosis upon arrival.  Trial of prone position, beginning over 1 pillow.  Progressed to TERRY and press-ups on pillow.  Pt. did well with this and pillow was removed for 3 more sets of 10 press-ups.  Continued in prone for quad stretch and heel squeeze.  Initiated [employer] squats which were well-tolerated, but getting off of machine was difficult.  Ended with lumbar extension facing wall.  Pt. says he has been doing something similar at his kitchen counter at home.  Discussed use of ice at home to reduce pain and nerve irritation.  Pt. will try extension exercises between now and next session.  Guidelines given regarding appropriate response to exercises.   -JOSHUA        User Key  (r) = Recorded By, (t) = Taken By, (c) = Cosigned By    Initials Name Provider Type    JOSHUA Segura PT Physical Therapist                                       Time Calculation:   Start Time: 0845  Total Timed Code Minutes- PT: 30 minute(s)    Therapy Charges for Today     Code Description Service Date Service Provider Modifiers Qty    79696989100  PT THER PROC EA 15 MIN 8/16/2017 Sowmya Segura PT GP 2                    Sowmya Segura PT  8/16/2017

## 2017-08-17 ENCOUNTER — APPOINTMENT (OUTPATIENT)
Dept: PHYSICAL THERAPY | Facility: HOSPITAL | Age: 62
End: 2017-08-17

## 2017-08-23 ENCOUNTER — HOSPITAL ENCOUNTER (OUTPATIENT)
Dept: PHYSICAL THERAPY | Facility: HOSPITAL | Age: 62
Setting detail: THERAPIES SERIES
Discharge: HOME | End: 2017-08-23

## 2017-08-23 DIAGNOSIS — M54.42 ACUTE LEFT-SIDED LOW BACK PAIN WITH LEFT-SIDED SCIATICA: Primary | ICD-10-CM

## 2017-08-23 PROCEDURE — 97110 THERAPEUTIC EXERCISES: CPT | Performed by: PHYSICAL THERAPIST

## 2017-08-23 PROCEDURE — 97140 MANUAL THERAPY 1/> REGIONS: CPT | Performed by: PHYSICAL THERAPIST

## 2017-08-23 NOTE — THERAPY TREATMENT NOTE
Outpatient Physical Therapy Ortho Treatment Note  [facility]     Patient Name: Russell Soto  : 1955  MRN: [id number]  Today's Date: 2017      Visit Date: 2017    Visit Dx:    ICD-10-CM ICD-9-CM   1. Acute left-sided low back pain with left-sided sciatica M54.42 724.2     724.3       Patient Active Problem List   Diagnosis   • Sinus bradycardia   • BPH (benign prostatic hypertrophy)   • Anemia   • Palpitations   • Health care maintenance   • Hyperlipidemia   • Acute bilateral low back pain with bilateral sciatica        Past Medical History:   Diagnosis Date   • Dizziness    • Rectal hemorrhage         Past Surgical History:   Procedure Laterality Date   • PROSTATE SURGERY  2016             PT Ortho       17 1000    Subjective Comments    Subjective Comments Pn seems to be less in the back and more across the buttocks. He is unable to identify patterns w/ his pn. Pn shoots down either leg, sometimes to his foot but more often to the back of the knees. Can happen several times an hour, regardless of whether he is sitting or standing/walking.   -RB    Subjective Pain    Able to rate subjective pain? yes  -RB    Pre-Treatment Pain Level 1  -RB    Post-Treatment Pain Level 1  -RB    Posture/Observations    Posture/Observations Comments Ambulates into clinic w/ forward flexed posture, slight R lateral lean/L pelvic shift, knees buckling every few steps.  -RB      User Key  (r) = Recorded By, (t) = Taken By, (c) = Cosigned By    Initials Name Provider Type    RB Daniela Gregory, PT Physical Therapist                            PT Assessment/Plan       17 1029       PT Assessment    Assessment Comments Pt denies change in symptoms since previous treatment. He reports slight decrease in pn intensity w/ repeated R SB and repeated extn, and had several instances of knees buckling during repeated flexion in standing. No change in pn by end of tx, but pt able to ambulate at more normal  pace and without knees buckling.  -RB     PT Plan    PT Plan Comments Cont w/ prone exercise program  -RB       User Key  (r) = Recorded By, (t) = Taken By, (c) = Cosigned By    Initials Name Provider Type    GAEL Gregory, PT Physical Therapist                    Exercises       08/23/17 1000          Subjective Comments    Subjective Comments Pn seems to be less in the back and more across the buttocks. He is unable to identify patterns w/ his pn. Pn shoots down either leg, sometimes to his foot but more often to the back of the knees. Can happen several times an hour, regardless of whether he is sitting or standing/walking.   -RB      Subjective Pain    Able to rate subjective pain? yes  -RB      Pre-Treatment Pain Level 1  -RB      Post-Treatment Pain Level 1  -RB      Exercise 1    Exercise Name 1 Pt warmed up on Nustep and then continued with w/ prone/extension based exercise program. Progressed to include prone alternating hip extension, bridges. Attempted manual pelvic shift correction w/ good response.  -RB      Time (Minutes) 1 30  -RB        User Key  (r) = Recorded By, (t) = Taken By, (c) = Cosigned By    Initials Name Provider Type    GAEL Gregory PT Physical Therapist                        Manual Rx (last 36 hours)      Manual Treatments       08/23/17 0900          Manual Rx 1    Manual Rx 1 Location Lumbar  -RB      Manual Rx 1 Type Prone PA glides, central and unilateral  -RB      Manual Rx 1 Grade Gr II-III  -RB      Manual Rx 1 Duration 8 min  -RB        User Key  (r) = Recorded By, (t) = Taken By, (c) = Cosigned By    Initials Name Provider Type    GAEL Gregory PT Physical Therapist                PT OP Goals       08/23/17 1251       Time Calculation    PT Goal Re-Cert Due Date 09/05/17  -RB       User Key  (r) = Recorded By, (t) = Taken By, (c) = Cosigned By    Initials Name Provider Type    GAEL Gregory PT Physical Therapist                Therapy Education        08/23/17 1028          Therapy Education    Given HEP   updated HEP to include TERRY, prone press ups, bridging, and prone heel squeeze.  -RB      Program Progressed  -RB      How Provided Verbal;Demonstration;Written  -RB      Provided to Patient  -RB      Level of Understanding Teach back education performed  -RB        User Key  (r) = Recorded By, (t) = Taken By, (c) = Cosigned By    Initials Name Provider Type    RB Daniela Gregory, PT Physical Therapist                Time Calculation:   Start Time: 0845  Total Timed Code Minutes- PT: 38 minute(s)    Therapy Charges for Today     Code Description Service Date Service Provider Modifiers Qty    61638220838 HC PT THER PROC EA 15 MIN 8/23/2017 Daniela Gregory, PT GP 2    86928726346 HC PT MANUAL THERAPY EA 15 MIN 8/23/2017 Daniela Gregory, PT GP 1                    Daniela Gregory, PT  8/23/2017

## 2017-08-24 ENCOUNTER — HOSPITAL ENCOUNTER (OUTPATIENT)
Dept: PHYSICAL THERAPY | Facility: HOSPITAL | Age: 62
Setting detail: THERAPIES SERIES
Discharge: HOME | End: 2017-08-24

## 2017-08-24 DIAGNOSIS — M54.42 ACUTE LEFT-SIDED LOW BACK PAIN WITH LEFT-SIDED SCIATICA: Primary | ICD-10-CM

## 2017-08-24 PROCEDURE — 97110 THERAPEUTIC EXERCISES: CPT | Performed by: PHYSICAL THERAPIST

## 2017-08-24 PROCEDURE — 97140 MANUAL THERAPY 1/> REGIONS: CPT | Performed by: PHYSICAL THERAPIST

## 2017-08-24 NOTE — THERAPY TREATMENT NOTE
Outpatient Physical Therapy Ortho Treatment Note  [facility]     Patient Name: Russell Soto  : 1955  MRN: [id number]  Today's Date: 2017      Visit Date: 2017    Visit Dx:    ICD-10-CM ICD-9-CM   1. Acute left-sided low back pain with left-sided sciatica M54.42 724.2     724.3       Patient Active Problem List   Diagnosis   • Sinus bradycardia   • BPH (benign prostatic hypertrophy)   • Anemia   • Palpitations   • Health care maintenance   • Hyperlipidemia   • Acute bilateral low back pain with bilateral sciatica        Past Medical History:   Diagnosis Date   • Dizziness    • Rectal hemorrhage         Past Surgical History:   Procedure Laterality Date   • PROSTATE SURGERY  2016             PT Ortho       17 0900    Subjective Comments    Subjective Comments Pn in buttocks/gluteal fold bilaterally. Denies any noticeable changes in frequency, intensity, or distribution of symptoms.  -RB    Subjective Pain    Able to rate subjective pain? yes  -RB    Pre-Treatment Pain Level 1  -RB    Post-Treatment Pain Level 1  -RB      17 1000    Subjective Comments    Subjective Comments Pn seems to be less in the back and more across the buttocks. He is unable to identify patterns w/ his pn. Pn shoots down either leg, sometimes to his foot but more often to the back of the knees. Can happen several times an hour, regardless of whether he is sitting or standing/walking.   -RB    Subjective Pain    Able to rate subjective pain? yes  -RB    Pre-Treatment Pain Level 1  -RB    Post-Treatment Pain Level 1  -RB    Posture/Observations    Posture/Observations Comments Ambulates into clinic w/ forward flexed posture, slight R lateral lean/L pelvic shift, knees buckling every few steps.  -RB      User Key  (r) = Recorded By, (t) = Taken By, (c) = Cosigned By    Initials Name Provider Type    RB Daniela Gregory, PT Physical Therapist                            PT Assessment/Plan       17 3075  08/23/17 1029    PT Assessment    Assessment Comments Pelvic shift dimished after addition of side glides at wall. Symptoms remain relatively unchanged, though pt continues to demo improved gait quality and low pn levels after treatment sessions.  -RB Pt denies change in symptoms since previous treatment. He reports slight decrease in pn intensity w/ repeated R SB and repeated extn, and had several instances of knees buckling during repeated flexion in standing. No change in pn by end of tx, but pt able to ambulate at more normal pace and without knees buckling.  -RB    PT Plan    PT Plan Comments Cont POC- may trial manual/mechanical lumbar traction next visit.   -RB Cont w/ prone exercise program  -RB      User Key  (r) = Recorded By, (t) = Taken By, (c) = Cosigned By    Initials Name Provider Type    RB Daniela Gregory, PT Physical Therapist                    Exercises       08/24/17 0900 08/23/17 1000       Subjective Comments    Subjective Comments Pn in buttocks/gluteal fold bilaterally. Denies any noticeable changes in frequency, intensity, or distribution of symptoms.  -RB Pn seems to be less in the back and more across the buttocks. He is unable to identify patterns w/ his pn. Pn shoots down either leg, sometimes to his foot but more often to the back of the knees. Can happen several times an hour, regardless of whether he is sitting or standing/walking.   -RB     Subjective Pain    Able to rate subjective pain? yes  -RB yes  -RB     Pre-Treatment Pain Level 1  -RB 1  -RB     Post-Treatment Pain Level 1  -RB 1  -RB     Exercise 1    Exercise Name 1 Continued w/ ther ex, progressed to include sit to stand from EOB (as pt reported significant difficulty standing from TG), prone hip extn w/ knees bent, added ball squeeze to bridges, and added standing R side glide at wall in attempt to correct L pelvic shift.  -RB Pt warmed up on Nustep and then continued with w/ prone/extension based exercise program.  Progressed to include prone alternating hip extension, bridges. Attempted manual pelvic shift correction w/ good response.  -RB     Time (Minutes) 1 35  -RB 30  -RB       User Key  (r) = Recorded By, (t) = Taken By, (c) = Cosigned By    Initials Name Provider Type    GAEL Gregory, PT Physical Therapist                        Manual Rx (last 36 hours)      Manual Treatments       08/24/17 0900 08/23/17 0900       Manual Rx 1    Manual Rx 1 Location Lumbar  -RB Lumbar  -RB     Manual Rx 1 Type SMWLM unilateral L4-5, seated EOB in slump position w/ active knee extension/ankle DF  -RB Prone PA glides, central and unilateral  -RB     Manual Rx 1 Grade  Gr II-III  -RB     Manual Rx 1 Duration x 5 reps each  -RB 8 min  -RB     Manual Rx 2    Manual Rx 2 Location Lumbar  -RB      Manual Rx 2 Type standing lumbar extension w/ manual R sideglide  -RB      Manual Rx 2 Duration x10 reps  -RB      Manual Rx 3    Manual Rx 3 Location Lumbar  -RB      Manual Rx 3 Type Seated central SNAG L4-5 w/ seated lumbar extension  -RB      Manual Rx 3 Duration 3 x 3 reps  -RB        User Key  (r) = Recorded By, (t) = Taken By, (c) = Cosigned By    Initials Name Provider Type    GAEL Gregory, PT Physical Therapist                PT OP Goals       08/24/17 0946 08/23/17 1251    Time Calculation    PT Goal Re-Cert Due Date 09/05/17  -RB 09/05/17  -RB      User Key  (r) = Recorded By, (t) = Taken By, (c) = Cosigned By    Initials Name Provider Type    GAEL Gregory, PT Physical Therapist                Therapy Education       08/24/17 0942 08/23/17 1028       Therapy Education    Given HEP   instructed pt to incorporate standing side glides into HEP.  -RB HEP   updated HEP to include TERRY, prone press ups, bridging, and prone heel squeeze.  -RB     Program Reinforced  -RB Progressed  -RB     How Provided Verbal  -RB Verbal;Demonstration;Written  -RB     Provided to Patient  -RB Patient  -RB     Level of Understanding Teach back  education performed  -RB Teach back education performed  -RB       User Key  (r) = Recorded By, (t) = Taken By, (c) = Cosigned By    Initials Name Provider Type    RB Daniela Gregory, PT Physical Therapist                Time Calculation:   Start Time: 0845  Total Timed Code Minutes- PT: 45 minute(s)    Therapy Charges for Today     Code Description Service Date Service Provider Modifiers Qty    72652482300 HC PT THER PROC EA 15 MIN 8/24/2017 Daniela Gregory, PT GP 2    32716804827 HC PT MANUAL THERAPY EA 15 MIN 8/24/2017 Daniela Gregory, PT GP 1                    Daniela Gregory, PT  8/24/2017

## 2017-08-29 ENCOUNTER — HOSPITAL ENCOUNTER (OUTPATIENT)
Dept: PHYSICAL THERAPY | Facility: HOSPITAL | Age: 62
Setting detail: THERAPIES SERIES
Discharge: HOME | End: 2017-08-29

## 2017-08-29 ENCOUNTER — OFFICE VISIT (OUTPATIENT)
Dept: INTERNAL MEDICINE | Facility: CLINIC | Age: 62
End: 2017-08-29

## 2017-08-29 VITALS
BODY MASS INDEX: 29.29 KG/M2 | HEART RATE: 61 BPM | SYSTOLIC BLOOD PRESSURE: 140 MMHG | OXYGEN SATURATION: 98 % | DIASTOLIC BLOOD PRESSURE: 60 MMHG | WEIGHT: 187 LBS

## 2017-08-29 DIAGNOSIS — M54.41 ACUTE BILATERAL LOW BACK PAIN WITH BILATERAL SCIATICA: Primary | ICD-10-CM

## 2017-08-29 DIAGNOSIS — M54.42 ACUTE BILATERAL LOW BACK PAIN WITH BILATERAL SCIATICA: Primary | ICD-10-CM

## 2017-08-29 DIAGNOSIS — M54.42 ACUTE LEFT-SIDED LOW BACK PAIN WITH LEFT-SIDED SCIATICA: Primary | ICD-10-CM

## 2017-08-29 PROCEDURE — 97140 MANUAL THERAPY 1/> REGIONS: CPT | Performed by: PHYSICAL THERAPIST

## 2017-08-29 PROCEDURE — 99213 OFFICE O/P EST LOW 20 MIN: CPT | Performed by: PHYSICIAN ASSISTANT

## 2017-08-29 PROCEDURE — 97110 THERAPEUTIC EXERCISES: CPT | Performed by: PHYSICAL THERAPIST

## 2017-08-29 NOTE — PROGRESS NOTES
Chief Complaint   Patient presents with   • Follow-up     Lower back pain       Subjective   Russell Soto is a 62 y.o. male.       History of Present Illness     Pt has been to 3 or 4 PT visits and has had some worsening of his back pain. His PT suggested that he may need further imaging to determine if there is some nerve compression. Still has intermittent sharp twinges of shooting pain, cannot reproduce or predict when it will occur.      Current Outpatient Prescriptions:   •  cyclobenzaprine (FLEXERIL) 5 MG tablet, Take 1 tablet by mouth 3 (Three) Times a Day As Needed for Muscle Spasms., Disp: 20 tablet, Rfl: 0  •  ibuprofen (ADVIL,MOTRIN) 400 MG tablet, Take 400 mg by mouth Every 6 (Six) Hours As Needed for Mild Pain (1-3)., Disp: , Rfl:      PMFSH  The following portions of the patient's history were reviewed and updated as appropriate: allergies, current medications, past family history, past medical history, past social history, past surgical history and problem list.    Review of Systems   Constitutional: Negative for appetite change, fever and unexpected weight change.   HENT: Negative.    Eyes: Negative for pain and visual disturbance.   Respiratory: Negative for chest tightness, shortness of breath and wheezing.    Cardiovascular: Negative for chest pain and palpitations.   Gastrointestinal: Negative for abdominal pain, blood in stool, diarrhea, nausea and vomiting.   Endocrine: Negative.    Genitourinary: Negative for difficulty urinating, flank pain, frequency and urgency.   Musculoskeletal: Positive for back pain. Negative for joint swelling.   Skin: Negative for color change and rash.   Neurological: Negative for tremors and weakness.   Hematological: Negative for adenopathy.   Psychiatric/Behavioral: Negative for confusion and decreased concentration.   All other systems reviewed and are negative.      Objective   /60  Pulse 61  Wt 187 lb (84.8 kg)  SpO2 98%  BMI 29.29  kg/m2    Physical Exam   Constitutional: He is oriented to person, place, and time. He appears well-developed and well-nourished. No distress.   HENT:   Head: Normocephalic and atraumatic.   Right Ear: Hearing, tympanic membrane, external ear and ear canal normal.   Left Ear: Hearing, tympanic membrane, external ear and ear canal normal.   Nose: Nose normal.   Mouth/Throat: Oropharynx is clear and moist.   Eyes: Conjunctivae and EOM are normal. Pupils are equal, round, and reactive to light. No scleral icterus.   Neck: Normal range of motion. Neck supple.   Cardiovascular: Normal rate, regular rhythm and normal heart sounds.  Exam reveals no gallop.    No murmur heard.  Pulmonary/Chest: Effort normal and breath sounds normal. No respiratory distress. He has no decreased breath sounds. He has no wheezes. He has no rhonchi. He has no rales. He exhibits no tenderness.   Abdominal: Soft. There is no tenderness.   Musculoskeletal: Normal range of motion. He exhibits tenderness. He exhibits no deformity.   Neurological: He is alert and oriented to person, place, and time. He has normal reflexes. He displays no atrophy, no tremor and normal reflexes. No sensory deficit. He exhibits normal muscle tone. Coordination normal.   Skin: Skin is warm and dry. No rash noted. He is not diaphoretic.   Psychiatric: He has a normal mood and affect. His behavior is normal. Judgment and thought content normal.   Nursing note and vitals reviewed.      ASSESSMENT/PLAN    Problem List Items Addressed This Visit        Other    Acute bilateral low back pain with bilateral sciatica - Primary     Pt has had worsening of his ongoing back pain, 2-3 weeks of PT without improvement, some worsening of symptoms. Refer for MRI of lumbar spine.         Relevant Orders    MRI Lumbar Spine Without Contrast               Return for Next scheduled follow up.

## 2017-08-29 NOTE — THERAPY TREATMENT NOTE
Outpatient Physical Therapy Ortho Treatment Note  [facility]     Patient Name: Russell Soto  : 1955  MRN: [id number]  Today's Date: 2017      Visit Date: 2017    Visit Dx:    ICD-10-CM ICD-9-CM   1. Acute left-sided low back pain with left-sided sciatica M54.42 724.2     724.3       Patient Active Problem List   Diagnosis   • Sinus bradycardia   • BPH (benign prostatic hypertrophy)   • Anemia   • Palpitations   • Health care maintenance   • Hyperlipidemia   • Acute bilateral low back pain with bilateral sciatica        Past Medical History:   Diagnosis Date   • Dizziness    • Rectal hemorrhage         Past Surgical History:   Procedure Laterality Date   • PROSTATE SURGERY  2016             PT Ortho       17 1200    Subjective Comments    Subjective Comments Symptoms seem to have worsened.  Pn in buttocks constant. Shooting pn still in either LE, but seem to be stronger on the R.  -RB    Subjective Pain    Able to rate subjective pain? yes  -RB    Pre-Treatment Pain Level 2  -RB    Post-Treatment Pain Level 1  -RB    Lumbar ROM Screen- Lower Quarter Clearing    Lumbar Flexion Normal   decreased pn  -RB    Lumbar Extension Impaired   75% w/ increased pn R lumbosacral region  -RB      User Key  (r) = Recorded By, (t) = Taken By, (c) = Cosigned By    Initials Name Provider Type    RB Daniela Gregory, PT Physical Therapist                            PT Assessment/Plan       17 1222       PT Assessment    Assessment Comments Pt's symptoms variable, continues to report pn in lumbosacral/gluteal region w/ intermittent shooting pn in BLE, now R>L, per his report not precipitated by any specific movement,position or activity.  He responded well to flexion based program today vs. previous extension program.  -RB     PT Plan    PT Plan Comments Cont POC- pt to contact MD office to schedule follow up visit.  -RB       User Key  (r) = Recorded By, (t) = Taken By, (c) = Cosigned By     Initials Name Provider Type    GAEL Gregory, PT Physical Therapist                    Exercises       08/29/17 1200          Subjective Comments    Subjective Comments Symptoms seem to have worsened.  Pn in buttocks constant. Shooting pn still in either LE, but seem to be stronger on the R.  -RB      Subjective Pain    Able to rate subjective pain? yes  -RB      Pre-Treatment Pain Level 2  -RB      Post-Treatment Pain Level 1  -RB      Exercise 1    Exercise Name 1 Shifted focus from prone/extension based movement patterns to flexion based this visit based on reports of worsening symptoms. See flow sheet.  -RB      Time (Minutes) 1 35  -RB        User Key  (r) = Recorded By, (t) = Taken By, (c) = Cosigned By    Initials Name Provider Type    GAEL Gregory PT Physical Therapist                        Manual Rx (last 36 hours)      Manual Treatments       08/29/17 1100          Manual Rx 1    Manual Rx 1 Location Lumbar  -RB      Manual Rx 1 Type SMWLM unilateral L4-5, seated EOB in slump position w/ active knee extension/ankle DF  -RB      Manual Rx 1 Duration x 5 reps each  -RB      Manual Rx 2    Manual Rx 2 Location Lumbar  -RB      Manual Rx 2 Type Supine manual distraction  -RB      Manual Rx 2 Duration 3 min  -RB      Manual Rx 3    Manual Rx 3 Location Lumbar  -RB      Manual Rx 3 Type Seated central and R unilateral SNAG L4-5 w/ seated lumbar extension  -RB      Manual Rx 3 Duration 1 x 5 reps  -RB        User Key  (r) = Recorded By, (t) = Taken By, (c) = Cosigned By    Initials Name Provider Type    GAEL Gregory PT Physical Therapist                PT OP Goals       08/29/17 1224       Time Calculation    PT Goal Re-Cert Due Date 09/05/17  -RB       User Key  (r) = Recorded By, (t) = Taken By, (c) = Cosigned By    Initials Name Provider Type    GAEL Gregory, PT Physical Therapist                Therapy Education       08/29/17 1221          Therapy Education    Given HEP   Instructed pt  to discontinue previous HEP, and issued new HEP consisting of flexion based activities and core stab  -RB      Program New  -RB      How Provided Verbal;Written  -RB      Provided to Patient  -RB      Level of Understanding Teach back education performed  -RB        User Key  (r) = Recorded By, (t) = Taken By, (c) = Cosigned By    Initials Name Provider Type    RB Daniela Gregory, PT Physical Therapist                Time Calculation:   Start Time: 0915  Total Timed Code Minutes- PT: 45 minute(s)    Therapy Charges for Today     Code Description Service Date Service Provider Modifiers Qty    07188453659 HC PT THER PROC EA 15 MIN 8/29/2017 Daniela Gregory, PT GP 2    31318224915 HC PT MANUAL THERAPY EA 15 MIN 8/29/2017 Daniela Gregory, PT GP 1                    Daniela Gregory, PT  8/29/2017

## 2017-08-30 NOTE — ASSESSMENT & PLAN NOTE
Pt has had worsening of his ongoing back pain, 2-3 weeks of PT without improvement, some worsening of symptoms. Refer for MRI of lumbar spine.

## 2017-08-31 ENCOUNTER — APPOINTMENT (OUTPATIENT)
Dept: PHYSICAL THERAPY | Facility: HOSPITAL | Age: 62
End: 2017-08-31

## 2017-09-01 ENCOUNTER — HOSPITAL ENCOUNTER (OUTPATIENT)
Dept: MRI IMAGING | Facility: HOSPITAL | Age: 62
Discharge: HOME | End: 2017-09-01
Admitting: PHYSICIAN ASSISTANT

## 2017-09-01 DIAGNOSIS — M54.42 ACUTE BILATERAL LOW BACK PAIN WITH BILATERAL SCIATICA: ICD-10-CM

## 2017-09-01 DIAGNOSIS — M54.41 ACUTE BILATERAL LOW BACK PAIN WITH BILATERAL SCIATICA: ICD-10-CM

## 2017-09-01 PROCEDURE — 72148 MRI LUMBAR SPINE W/O DYE: CPT

## 2017-09-05 ENCOUNTER — TELEPHONE (OUTPATIENT)
Dept: INTERNAL MEDICINE | Facility: CLINIC | Age: 62
End: 2017-09-05

## 2017-09-05 DIAGNOSIS — M51.26 LUMBAR DISC HERNIATION: Primary | ICD-10-CM

## 2017-09-05 DIAGNOSIS — M54.16 LUMBAR NERVE ROOT COMPRESSION: ICD-10-CM

## 2017-09-05 NOTE — TELEPHONE ENCOUNTER
Spoke with patient and gave him results of MRI showing L4-L5 disc bulge encroaching in nerve root. Will refer him to neurosurgery for eval.

## 2017-09-08 ENCOUNTER — OFFICE VISIT (OUTPATIENT)
Dept: NEUROSURGERY | Facility: CLINIC | Age: 62
End: 2017-09-08

## 2017-09-08 VITALS
HEIGHT: 67 IN | TEMPERATURE: 97.6 F | DIASTOLIC BLOOD PRESSURE: 100 MMHG | BODY MASS INDEX: 29.03 KG/M2 | WEIGHT: 185 LBS | SYSTOLIC BLOOD PRESSURE: 170 MMHG

## 2017-09-08 DIAGNOSIS — M48.061 FORAMINAL STENOSIS OF LUMBAR REGION: Primary | ICD-10-CM

## 2017-09-08 DIAGNOSIS — M43.16 SPONDYLOLISTHESIS OF LUMBAR REGION: ICD-10-CM

## 2017-09-08 PROCEDURE — 99243 OFF/OP CNSLTJ NEW/EST LOW 30: CPT | Performed by: NEUROLOGICAL SURGERY

## 2017-09-08 NOTE — PROGRESS NOTES
NAME: DONTE EUCEDA   DOS: 2017  : 1955  PCP: RICCO Brown    Chief Complaint:  Low back pain and bilateral leg pain  Chief Complaint   Patient presents with   • Back Pain     LBP    • Leg Pain     Bilateral    • Hip Pain     Bilateral        History of Present Illness:  62 y.o. male   This is a 62-year-old retired male with a history of some chronic axial back issues.  He noticed increasing back pain through the summer associated with classic symptoms of neurogenic claudication the majority of the symptoms complex centers around right sided L5 distribution medial buttock pain as well as some left-sided pain.  The pain is alleviated with flexion and walking classically is not associated with vascular overtones.  He does have a history of sepsis and admission for urinary retention that could be related to a history of neurogenic bladder in the past.  He's here for evaluation and has failed physical therapy    PMHX  Allergies:  Allergies   Allergen Reactions   • Sulfa Antibiotics      Medications    Current Outpatient Prescriptions:   •  ibuprofen (ADVIL,MOTRIN) 400 MG tablet, Take 400 mg by mouth Every 6 (Six) Hours As Needed for Mild Pain (1-3)., Disp: , Rfl:   Past Medical History:  Past Medical History:   Diagnosis Date   • Dizziness    • Rectal hemorrhage      Past Surgical History:  Past Surgical History:   Procedure Laterality Date   • PROSTATE SURGERY       Social Hx:  Social History   Substance Use Topics   • Smoking status: Never Smoker   • Smokeless tobacco: Never Used   • Alcohol use Yes      Comment: Social     Family Hx:  Family History   Problem Relation Age of Onset   • Diabetes Mother      Review of Systems:        Review of Systems   Constitutional: Negative for activity change, appetite change, chills, diaphoresis, fatigue, fever and unexpected weight change.   HENT: Negative for congestion, dental problem, drooling, ear discharge, ear pain, facial swelling, hearing loss,  mouth sores, nosebleeds, postnasal drip, rhinorrhea, sinus pressure, sneezing, sore throat, tinnitus, trouble swallowing and voice change.    Eyes: Negative for photophobia, pain, discharge, redness, itching and visual disturbance.   Respiratory: Negative for apnea, cough, choking, chest tightness, shortness of breath, wheezing and stridor.    Cardiovascular: Negative for chest pain, palpitations and leg swelling.   Gastrointestinal: Negative for abdominal distention, abdominal pain, anal bleeding, blood in stool, constipation, diarrhea, nausea, rectal pain and vomiting.   Endocrine: Negative for cold intolerance, heat intolerance, polydipsia, polyphagia and polyuria.   Genitourinary: Negative for decreased urine volume, difficulty urinating, dysuria, enuresis, flank pain, frequency, genital sores, hematuria and urgency.   Musculoskeletal: Positive for back pain and myalgias. Negative for arthralgias, gait problem, joint swelling, neck pain and neck stiffness.   Skin: Negative for color change, pallor, rash and wound.   Allergic/Immunologic: Negative for environmental allergies, food allergies and immunocompromised state.   Neurological: Negative for dizziness, tremors, seizures, syncope, facial asymmetry, speech difficulty, weakness, light-headedness, numbness and headaches.   Hematological: Negative for adenopathy. Does not bruise/bleed easily.   Psychiatric/Behavioral: Negative for agitation, behavioral problems, confusion, decreased concentration, dysphoric mood, hallucinations, self-injury, sleep disturbance and suicidal ideas. The patient is not nervous/anxious and is not hyperactive.    All other systems reviewed and are negative.       I reviewed the patient's past medical history and review of systems family history social history etc. per the medical record    Physical Examination:  Vitals:    09/08/17 1047   BP: (!) 190/100   Temp: 97.6 °F (36.4 °C)      General Appearance:   Well developed, well  nourished, well groomed, alert, and cooperative.  Neurological examination:  Neurologic Exam  Vital signs were reviewed and documented in the chart  Patient appeared in good neurologic function with normal comprehension fluent speech  Mood and affect are normal  Sense of smell deferred    Pupils symmetric equally reactive funduscopic exam not visualized   Visual fields intact to confrontation  Extraocular movements intact  Face motor function is symmetric  Facial sensations normal  Hearing intact to finger rub hearing intact to finger rub  Tongue is midline  Palate symmetric  Swallowing normal  Shoulder shrug normal    Muscle bulk and tone normal  5 out of 5 strength no motor drift  Gait normal intact  Negative Romberg  No clonus long tract signs or myelopathy    Reflexes absent bilaterally  No edema noted and extremities skin appears normal    Straight leg raise sign absent  No signs of intrinsic hip dysfunction  Back is without any lesions or abnormality  Feet are warm and well perfused  Per Tory sensation is normal      Review of Imaging/DATA:  Reviewed an MRI it's rather impressive he has clumping of his nerve roots at L3 4 at L4 5 she has left L5 lateral recess stenosis right-sided L4 intraforaminal disease  Diagnoses/Plan:    Mr. Soto is a 62 y.o. male   I discussed within the treatment options my suspicion as is that he is can and getting surgery.  He needs a decompressive laminectomy with undercutting the lateral recesses particularly at the left L5 junction and undercutting of the L3 area.  His intraforaminal disease is modest and he is areflexic.  I like to see him back with a lumbar flexion extension film as well as his wife to discuss surgical issues.  The risks benefits and expected outcomes from surgical decompression complication rates and recovery as well as need for other surgeries.

## 2017-09-08 NOTE — PATIENT INSTRUCTIONS
Spondylolisthesis With Rehab  The slipping of one or multiple vertebrae out of the correct anatomical position is a condition known as spondylolisthesis. Spondylolisthesis is most common in adolescents and is caused by a number of different reasons, such as vertebral fracture or something you are born with (congenital). Spondylolisthesis is diagnosed with the use of X-rays.  SYMPTOMS   · Dull, achy pain in the lower back.  · Pain that worsens with extension of the spine.  · Tightness of the muscles on the back of the thigh.  · Lower back stiffness.  · Signs of nerve damage: pain, numbness, or weakness affecting one or both lower extremities.  · Muscle wasting (atrophy), uncommon.  · Loss of stool (bowel) or urine (bladder) function.  CAUSES   The symptoms of spondylolisthesis are caused by one or more vertebrae that are out of alignment, placing pressure on the spinal cord. Common mechanisms of injury include:  · Congenital defect of the spine.  · Degenerative process.  · Stress fracture of the spine.  · Fracture due to trauma to the spine.  RISK INCREASES WITH:  · Activities that have a risk of hyperextending the back.  · Activities that have a risk of excessively rotating the spine.  · Poor strength and flexibility.  · Failure to warm up properly before activity.  · Family history of spondylolysis or spondylolisthesis.  · Improper sports technique.  PREVENTION  · Warm up and stretch properly before activity.  · Allow for adequate recovery between workouts.  · Maintain physical fitness:    Strength, flexibility, and endurance.    Cardiovascular fitness.  · Learn and use proper technique. When possible, have a  correct improper technique.  PROGNOSIS   If treated properly, the spondylolisthesis usually resolves.  RELATED COMPLICATIONS   · Recurrent symptoms that result in a chronic problem.  · Inability to compete in athletics.  · Prolonged healing time, if improperly treated or reinjured.  · Failure of the  fracture to heal (nonunion).  · Healing of the fracture in a poor position (malunion).  TREATMENT  Treatment initially involves resting from any activities that aggravate the symptoms and the use of ice and medications to help reduce pain and inflammation. The use of strengthening and stretching exercises may help reduce pain with activity. These exercises may be performed at home or with referral to a therapist. It is important to learn how to use proper body mechanics as to not place undue stress on your spine. If the injury is severe, then your caregiver may recommend a back brace to allow for healing, or even surgery. Surgery often involves fusing two adjacent vertebrae so no movement is allowed between them.   MEDICATION   · If pain medication is necessary, then nonsteroidal anti-inflammatory medications, such as aspirin and ibuprofen, or other minor pain relievers, such as acetaminophen, are often recommended.  · Do not take pain medication for 7 days before surgery.  · Prescription pain relievers may be given if deemed necessary by your caregiver. Use only as directed and only as much as you need.  HEAT AND COLD  · Cold treatment (icing) relieves pain and reduces inflammation. Cold treatment should be applied for 10 to 15 minutes every 2 to 3 hours for inflammation and pain and immediately after any activity that aggravates your symptoms. Use ice packs or massage the area with a piece of ice (ice massage).  · Heat treatment may be used prior to performing the stretching and strengthening activities prescribed by your caregiver, physical therapist, or . Use a heat pack or soak the injury in warm water.  SEEK MEDICAL CARE IF:  · Treatment seems to offer no benefit, or the condition worsens.  · Any medications produce adverse side effects.  · Any complications from surgery occur:    Pain, numbness, or coldness in the extremity operated upon.    Discoloration of the nail beds (they become blue or  gray) of the extremity operated upon.    Signs of infections (fever, pain, inflammation, redness, or persistent bleeding).  EXERCISES  RANGE OF MOTION (ROM) AND STRETCHING EXERCISES - Spondylolisthesis  Most people with low back pain will find that their symptoms worsen with either excessive bending forward (flexion) or arching at the low back (extension). The exercises which will help resolve your symptoms will focus on the opposite motion. Your physician, physical therapist or  will help you determine which exercises will be most helpful to resolve your low back pain. Do not complete any exercises without first consulting with your clinician. Discontinue any exercises which worsen your symptoms until you speak to your clinician. If you have pain, numbness or tingling which travels down into your buttocks, leg, or foot, the goal of the therapy is for these symptoms to move closer to your back and eventually resolve. Occasionally, these leg symptoms will get better, but your low back pain may worsen; this is typically an indication of progress in your rehabilitation. Be certain to be very alert to any changes in your symptoms and the activities in which you participated in the 24 hours prior to the change. Sharing this information with your clinician will allow him/her to most efficiently treat your condition.  These exercises may help you when beginning to rehabilitate your injury. Your symptoms may resolve with or without further involvement from your physician, physical therapist or . While completing these exercises, remember:   · Restoring tissue flexibility helps normal motion to return to the joints. This allows healthier, less painful movement and activity.  · An effective stretch should be held for at least 30 seconds.  · A stretch should never be painful. You should only feel a gentle lengthening or release in the stretched tissue.  FLEXION RANGE OF MOTION AND STRETCHING  "EXERCISES:  STRETCH - Flexion, Single Knee to Chest  · Lie on a firm bed or floor with both legs extended in front of you.  · Keeping one leg in contact with the floor, bring your opposite knee to your chest. Hold your leg in place by either grabbing behind your thigh or at your knee.  · Pull until you feel a gentle stretch in your low back. Hold __________ seconds.  Slowly release your grasp and repeat the exercise with the opposite side.  Repeat __________ times. Complete this exercise __________ times per day.   STRETCH - Flexion, Double Knee to Chest  · Lie on a firm bed or floor with both legs extended in front of you.  · Keeping one leg in contact with the floor, bring your opposite knee to your chest.  · Tense your stomach muscles to support your back and then lift your other knee to your chest. Hold your legs in place by either grabbing behind your thighs or at your knees.  · Pull both knees toward your chest until you feel a gentle stretch in your low back. Hold __________ seconds.  · Tense your stomach muscles and slowly return one leg at a time to the floor.  Repeat __________ times. Complete this exercise __________ times per day.   STRENGTHENING EXERCISES - Spondylolisthesis  These exercises may help you when beginning to rehabilitate your injury. These exercises should be done near your \"sweet spot.\" This is the neutral, low-back arch, somewhere between fully rounded and fully arched, that is your least painful position. When performed in this safe range of motion, these exercises can be used for people who have either a flexion or extension based injury. These exercises may resolve your symptoms with or without further involvement from your physician, physical therapist or . While completing these exercises, remember:   · Muscles can gain both the endurance and the strength needed for everyday activities through controlled exercises.  · Complete these exercises as instructed by your " physician, physical therapist or . Progress the resistance and repetitions only as guided.  · You may experience muscle soreness or fatigue, but the pain or discomfort you are trying to eliminate should never worsen during these exercises. If this pain does worsen, stop and make certain you are following the directions exactly. If the pain is still present after adjustments, discontinue the exercise until you can discuss the trouble with your clinician.  STRENGTHENING - Deep Abdominals, Pelvic Tilt   · Lie on a firm bed or floor. Keeping your legs in front of you, bend your knees so they are both pointed toward the ceiling and your feet are flat on the floor.  · Tense your lower abdominal muscles to press your low back into the floor. This motion will rotate your pelvis so that your tail bone is scooping upwards rather than pointing at your feet or into the floor.  · With a gentle tension and even breathing, hold this position for __________ seconds.  Repeat __________ times. Complete this exercise __________ times per day.   STRENGTHENING - Abdominals, Crunches   · Lie on a firm bed or floor. Keeping your legs in front of you, bend your knees so they are both pointed toward the ceiling and your feet are flat on the floor. Cross your arms over your chest.  · Slightly tip your chin down without bending your neck.  · Tense your abdominals and slowly lift your trunk high enough to just clear your shoulder blades. Lifting higher can put excessive stress on the low back and does not further strengthen your abdominal muscles.  · Control your return to the starting position.  Repeat __________ times. Complete this exercise __________ times per day.   STRENGTHENING - Quadruped, Opposite UE/LE Lift   · Assume a hands and knees position on a firm surface. Keep your hands under your shoulders and your knees under your hips. You may place padding under your knees for comfort.  · Find your neutral spine and  gently tense your abdominal muscles so that you can maintain this position. Your shoulders and hips should form a rectangle that is parallel with the floor and is not twisted.  · Keeping your trunk steady, lift your right hand no higher than your shoulder and then your left leg no higher than your hip. Make sure you are not holding your breath. Hold this position __________ seconds.  · Continuing to keep your abdominal muscles tense and your back steady, slowly return to your starting position. Repeat with the opposite arm and leg.  Repeat __________ times. Complete this exercise __________ times per day.   STRENGTHENING - Lower Abdominals, Double Knee Lift  · Lie on a firm bed or floor. Keeping your legs in front of you, bend your knees so they are both pointed toward the ceiling and your feet are flat on the floor.  · Tense your abdominal muscles to brace your low back and slowly lift both of your knees until they come over your hips. Be certain not to hold your breath.  · Hold __________ seconds. Using your abdominal muscles, return to the starting position in a slow and controlled manner.  Repeat __________ times. Complete this exercise __________ times per day.   POSTURE AND BODY MECHANICS CONSIDERATIONS - Spondylolisthesis  Keeping correct posture when sitting, standing or completing your activities will reduce the stress put on different body tissues, allowing injured tissues a chance to heal and limiting painful experiences. The following are general guidelines for improved posture. Your physician or physical therapist will provide you with any instructions specific to your needs. While reading these guidelines, remember:  · The exercises prescribed by your provider will help you have the flexibility and strength to maintain correct postures.  · The correct posture provides the optimal environment for your joints to work. All of your joints have less wear and tear when properly supported by a spine with good  posture. This means you will experience a healthier, less painful body.  · Correct posture must be practiced with all of your activities, especially prolonged sitting and standing. Correct posture is as important when doing repetitive low-stress activities (typing) as it is when doing a single heavy-load activity (lifting).  PROPER SITTING POSTURE  In order to minimize stress and discomfort on your spine, you must sit with correct posture. Sitting with good posture should be effortless for a healthy body. Returning to good posture is a gradual process. Many people can work toward this most comfortably by using various supports until they have the flexibility and strength to maintain this posture on their own.  When sitting with proper posture, your ears will fall over your shoulders and your shoulders will fall over your hips. You should use the back of the chair to support your upper back. Your low back will be in a neutral position, just slightly arched. You may place a small pillow or folded towel at the base of your low back for   support.   When working at a desk, create an environment that supports good, upright posture. Without extra support, muscles fatigue and lead to excessive strain on joints and other tissues. Keep these recommendations in mind:  CHAIR:  · A chair should be able to slide under your desk when your back makes contact with the back of the chair. This allows you to work closely.  · The chair's height should allow your eyes to be level with the upper part of your monitor and your hands to be slightly lower than your elbows.  BODY POSITION  · Your feet should make contact with the floor. If this is not possible, use a foot rest.  · Keep your ears over your shoulders. This will reduce stress on your neck and low back.  INCORRECT SITTING POSTURES  If you are feeling tired and unable to assume a healthy sitting posture, do not slouch or slump. This puts excessive strain on your back tissues,  causing more damage and pain. Healthier options include:  · Using more support, like a lumbar pillow.  · Switching tasks to something that requires you to be upright or walking.  · Taking a brief walk.  · Lying down to rest in a neutral-spine position.   CORRECT LIFTING TECHNIQUES  DO:   · Assume a wide stance. This will provide you more stability and the opportunity to get as close as possible to the object which you are lifting.  · Tense your abdominals to brace your spine; then bend at the knees and hips. Keeping your back locked in a neutral-spine position, lift using your leg muscles. Lift with your legs, keeping your back straight.  · Test the weight of unknown objects before attempting to lift them.  · Try to keep your elbows locked down at your sides in order get the best strength from your shoulders when carrying an object.  · Always ask for help when lifting heavy or awkward objects.  INCORRECT LIFTING TECHNIQUES  DO NOT:   · Lock your knees when lifting, even if it is a small object.  · Bend and twist. Pivot at your feet or move your feet when needing to change directions.  · Assume that you cannot safely  a paperclip without proper posture.     This information is not intended to replace advice given to you by your health care provider. Make sure you discuss any questions you have with your health care provider.     Document Released: 12/18/2006 Document Revised: 09/07/2016 Document Reviewed: 09/27/2016  [employer] Interactive Patient Education ©2017 [employer]    Spinal Stenosis  Spinal stenosis is when the open spaces between the bones of your spine (vertebrae) get smaller (narrow). It is caused by bone pushing on the open spaces of your spine. This puts pressure on your spine and the nerves in your spine. You may be given medicine to lessen the puffiness (inflammation) in your nerves. Other times, surgery is needed.  HOME CARE  · Change positions when you sit, stand, and lie. This can help  take pressure off your nerves.  · Do exercises as told by your doctor to strengthen the middle part of your body.  · Lose weight if your doctor suggests it. This takes pressure off your spine.  · Take all medicine as told by your doctor.  MAKE SURE YOU:  · Understand these instructions.  · Will watch your condition.  · Will get help right away if you are not doing well or get worse.     This information is not intended to replace advice given to you by your health care provider. Make sure you discuss any questions you have with your health care provider.     Document Released: 04/12/2012 Document Revised: 08/20/2014 Document Reviewed: 03/28/2014  [employer] Interactive Patient Education ©2017 [employer]    Back Injury Prevention  Back injuries can be very painful. They can also be difficult to heal. After having one back injury, you are more likely to injure your back again. It is important to learn how to avoid injuring or re-injuring your back. The following tips can help you to prevent a back injury.  WHAT SHOULD I KNOW ABOUT PHYSICAL FITNESS?  · Exercise for 30 minutes per day on most days of the week or as told by your doctor. Make sure to:  ¨ Do aerobic exercises, such as walking, jogging, biking, or swimming.  ¨ Do exercises that increase balance and strength, such as ara chi and yoga.  ¨ Do stretching exercises. This helps with flexibility.  ¨ Try to develop strong belly (abdominal) muscles. Your belly muscles help to support your back.  · Stay at a healthy weight. This helps to decrease your risk of a back injury.  WHAT SHOULD I KNOW ABOUT MY DIET?  · Talk with your doctor about your overall diet. Take supplements and vitamins only as told by your doctor.  · Talk with your doctor about how much calcium and vitamin D you need each day. These nutrients help to prevent weakening of the bones (osteoporosis).  · Include good sources of calcium in your diet, such as:    Dairy products.    Green leafy  "vegetables.    Products that have had calcium added to them (fortified).  · Include good sources of vitamin D in your diet, such as:    Milk.    Foods that have had vitamin D added to them.  WHAT SHOULD I KNOW ABOUT MY POSTURE?  · Sit up straight and stand up straight. Avoid leaning forward when you sit or hunching over when you stand.  · Choose chairs that have good low-back (lumbar) support.  · If you work at a desk, sit close to it so you do not need to lean over. Keep your chin tucked in. Keep your neck drawn back. Keep your elbows bent so your arms look like the letter \"L\" (right angle).  · Sit high and close to the steering wheel when you drive. Add a low-back support to your car seat, if needed.  · Avoid sitting or standing in one position for very long. Take breaks to get up, stretch, and walk around at least one time every hour. Take breaks every hour if you are driving for long periods of time.  · Sleep on your side with your knees slightly bent, or sleep on your back with a pillow under your knees. Do not lie on the front of your body to sleep.  WHAT SHOULD I KNOW ABOUT LIFTING, TWISTING, AND REACHING  Lifting and Heavy Lifting   · Avoid heavy lifting, especially lifting over and over again. If you must do heavy lifting:    Stretch before lifting.    Work slowly.    Rest between lifts.    Use a tool such as a cart or a donny to move objects if one is available.    Make several small trips instead of carrying one heavy load.    Ask for help when you need it, especially when moving big objects.  · Follow these steps when lifting:    Stand with your feet shoulder-width apart.    Get as close to the object as you can. Do not  a heavy object that is far from your body.    Use handles or lifting straps if they are available.    Bend at your knees. Squat down, but keep your heels off the floor.    Keep your shoulders back. Keep your chin tucked in. Keep your back straight.    Lift the object slowly while " you tighten the muscles in your legs, belly, and butt. Keep the object as close to the center of your body as possible.  · Follow these steps when putting down a heavy load:    Stand with your feet shoulder-width apart.    Lower the object slowly while you tighten the muscles in your legs, belly, and butt. Keep the object as close to the center of your body as possible.    Keep your shoulders back. Keep your chin tucked in. Keep your back straight.    Bend at your knees. Squat down, but keep your heels off the floor.    Use handles or lifting straps if they are available.  Twisting and Reaching   · Avoid lifting heavy objects above your waist.  · Do not twist at your waist while you are lifting or carrying a load. If you need to turn, move your feet.  · Do not bend over without bending at your knees.  · Avoid reaching over your head, across a table, or for an object on a high surface.    WHAT ARE SOME OTHER TIPS?  · Avoid wet floors and icy ground. Keep sidewalks clear of ice to prevent falls.    · Do not sleep on a mattress that is too soft or too hard.    · Keep items that you use often within easy reach.    · Put heavier objects on shelves at waist level, and put lighter objects on lower or higher shelves.  · Find ways to lower your stress, such as:    Exercise.    Massage.    Relaxation techniques.  · Talk with your doctor if you feel anxious or depressed. These conditions can make back pain worse.  · Wear flat heel shoes with cushioned soles.  · Avoid making quick (sudden) movements.  · Use both shoulder straps when carrying a backpack.  · Do not use any tobacco products, including cigarettes, chewing tobacco, or electronic cigarettes. If you need help quitting, ask your doctor.     This information is not intended to replace advice given to you by your health care provider. Make sure you discuss any questions you have with your health care provider.     Document Released: 06/05/2009 Document Revised:  05/03/2016 Document Reviewed: 12/22/2015  [employer] Interactive Patient Education ©2017 [employer]      If you have any questions in regards to your visit with  today, please do not hesitate to contact our office. Ask to speak with a CMA for any clinical questions you may have.    Carmen Da Silva CMA    [phone]     Please bring your disc with any imagining that may help in your care to EVERY visit!    EVERY DISC EVERY VISIT.   Thank you.

## 2017-09-12 ENCOUNTER — HOSPITAL ENCOUNTER (OUTPATIENT)
Dept: PHYSICAL THERAPY | Facility: HOSPITAL | Age: 62
Setting detail: THERAPIES SERIES
Discharge: HOME | End: 2017-09-12

## 2017-09-12 ENCOUNTER — OFFICE VISIT (OUTPATIENT)
Dept: INTERNAL MEDICINE | Facility: CLINIC | Age: 62
End: 2017-09-12

## 2017-09-12 ENCOUNTER — HOSPITAL ENCOUNTER (OUTPATIENT)
Dept: GENERAL RADIOLOGY | Facility: HOSPITAL | Age: 62
Discharge: HOME | End: 2017-09-12
Attending: NEUROLOGICAL SURGERY | Admitting: NEUROLOGICAL SURGERY

## 2017-09-12 VITALS
SYSTOLIC BLOOD PRESSURE: 148 MMHG | WEIGHT: 187.61 LBS | BODY MASS INDEX: 29.38 KG/M2 | DIASTOLIC BLOOD PRESSURE: 70 MMHG | HEART RATE: 67 BPM | OXYGEN SATURATION: 99 %

## 2017-09-12 DIAGNOSIS — M54.42 ACUTE LEFT-SIDED LOW BACK PAIN WITH LEFT-SIDED SCIATICA: Primary | ICD-10-CM

## 2017-09-12 DIAGNOSIS — I15.8 OTHER SECONDARY HYPERTENSION: Primary | ICD-10-CM

## 2017-09-12 DIAGNOSIS — M54.42 ACUTE BILATERAL LOW BACK PAIN WITH BILATERAL SCIATICA: ICD-10-CM

## 2017-09-12 DIAGNOSIS — M54.41 ACUTE BILATERAL LOW BACK PAIN WITH BILATERAL SCIATICA: ICD-10-CM

## 2017-09-12 PROCEDURE — 72114 X-RAY EXAM L-S SPINE BENDING: CPT

## 2017-09-12 PROCEDURE — 97110 THERAPEUTIC EXERCISES: CPT | Performed by: PHYSICAL THERAPIST

## 2017-09-12 PROCEDURE — 99213 OFFICE O/P EST LOW 20 MIN: CPT | Performed by: PHYSICIAN ASSISTANT

## 2017-09-12 RX ORDER — LISINOPRIL 5 MG/1
5 TABLET ORAL DAILY
Qty: 30 TABLET | Refills: 2 | Status: SHIPPED | OUTPATIENT
Start: 2017-09-12 | End: 2018-08-17 | Stop reason: SDUPTHER

## 2017-09-12 NOTE — THERAPY RE-EVALUATION
Outpatient Physical Therapy Ortho Re-Evaluation   [place]     Patient Name: Russell Soto  : 1955  MRN: [id number]  Today's Date: 2017      Visit Date: 2017    Patient Active Problem List   Diagnosis   • Sinus bradycardia   • BPH (benign prostatic hypertrophy)   • Anemia   • Palpitations   • Health care maintenance   • Hyperlipidemia   • Acute bilateral low back pain with bilateral sciatica        Past Medical History:   Diagnosis Date   • Dizziness    • Rectal hemorrhage         Past Surgical History:   Procedure Laterality Date   • PROSTATE SURGERY  2016       Visit Dx:     ICD-10-CM ICD-9-CM   1. Acute left-sided low back pain with left-sided sciatica M54.42 724.2     724.3                 PT Ortho       17 0800    Subjective Comments    Subjective Comments Continues to have constant pn in the low back and buttocks, w/ sporadic shooting pains down the backs of both legs. Pn is worsened by standing, walking, improved w/ sitting or laying down with hips/knees bent. Saw Dr. Angeles who is recommending surgical intervention. Follows up again Thursday.  -RB    Subjective Pain    Able to rate subjective pain? yes  -RB    Pre-Treatment Pain Level 2  -RB    Post-Treatment Pain Level 2  -RB    Posture/Observations    Posture/Observations Comments Presents to clinic ambulating w/ QC, forward flexed posture.  -RB    DTR- Lower Quarter Clearing    Patellar tendon (L2-4) 0- No response  -RB    Achilles tendon (S1-2) 0- No response  -RB    Neural Tension Signs- Lower Quarter Clearing    Slump Negative  -RB    Well Slump Negative  -RB    SLR Negative  -RB    Prone knee flexion Negative  -RB    Sensory Screen for Light Touch- Lower Quarter Clearing    L1 (inguinal area) Intact  -RB    L2 (anterior mid thigh) Intact  -RB    L3 (distal anterior thigh) Intact  -RB    L4 (medial lower leg/foot) Intact  -RB    L5 (lateral lower leg/great toe) Left:;Diminished  -RB    S1 (bottom of foot) Intact  -RB     Myotomal Screen- Lower Quarter Clearing    Hip flexion (L2) Left:;4+ (Good +);Right:;4 (Good)  -RB    Knee extension (L3) Bilateral:;5 (Normal)  -RB    Ankle DF (L4) Bilateral:;5 (Normal)  -RB    Great toe extension (L5) Bilateral:;5 (Normal)  -RB    Ankle PF (S1) Bilateral:;5 (Normal)  -RB    Knee flexion (S2) Bilateral:;5 (Normal)  -RB    Lumbar ROM Screen- Lower Quarter Clearing    Lumbar Flexion Normal  -RB    Lumbar Extension Impaired   10%, pn R lumbar and down LLE  -RB    Lumbar Lateral Flexion Normal  -RB    Lumbar Rotation Normal  -RB    Lumbosacral Palpation    Lumbosacral Palpation? --   denies TTP  -RB    Lumbosacral Accessory Motions    Lumbosacral Accessory Motions Tested? --   intact and w/o reproduction of pn  -RB    Lower Extremity Flexibility    Hamstrings Bilateral:;Mildly limited  -RB    ITB Bilateral:;WNL  -RB    Hip External Rotators Bilateral:;WNL  -RB      User Key  (r) = Recorded By, (t) = Taken By, (c) = Cosigned By    Initials Name Provider Type    GAEL Gregory, PT Physical Therapist                            Therapy Education       09/12/17 1640          Therapy Education    Given HEP   reviewed previous HEP, reinforced log rolling w/ bed mobility, avoidance of exacerbating activities  -RB      Program Reinforced  -RB      How Provided Verbal  -RB      Provided to Patient  -RB      Level of Understanding Teach back education performed  -RB        User Key  (r) = Recorded By, (t) = Taken By, (c) = Cosigned By    Initials Name Provider Type    GAEL Gregory, PT Physical Therapist                PT OP Goals       09/12/17 1645 09/12/17 1600    PT Short Term Goals    STG Date to Achieve  10/03/17  -RB    STG 1  Pt to be compliant w/ initial HEP for strengthening and flexibility.  -RB    STG 1 Progress  Met  -RB    STG 2  Pt to report at least a 25% reduction in pn  -RB    STG 2 Progress  Not Met  -RB    STG 3  Pt to report no return of LE symptoms w/ daily activities.  -RB    STG  3 Progress  Not Met  -RB    Long Term Goals    LTG Date to Achieve  10/24/17  -RB    LTG 1  Pt to be independent w/ long term HEP for strengthening, flexibility, and symptom mgmt  -RB    LTG 1 Progress  Not Met  -RB    LTG 2  Pt to report at least a 50% reduction in pn  -RB    LTG 2 Progress  Not Met  -RB    LTG 3  Pt to demo functional trunk ROM in all planes w/o increase in pn.  -RB    LTG 3 Progress  Not Met  -RB    LTG 4  Pt to improve Mod Oswestry score by at least 8 pts to reflect improved pn and function.  -RB    LTG 4 Progress  Not Met  -RB    Time Calculation    PT Goal Re-Cert Due Date 12/11/17  -RB 12/11/17  -RB      09/12/17 1000       PT Short Term Goals    STG Date to Achieve 08/29/17  -RB     STG 1 Pt to be compliant w/ initial HEP for strengthening and flexibility.  -RB     STG 1 Progress Met  -RB     STG 2 Pt to report at least a 25% reduction in pn  -RB     STG 2 Progress Not Met  -RB     STG 3 Pt to report no return of LE symptoms w/ daily activities.  -RB     STG 3 Progress Not Met  -RB     Long Term Goals    LTG Date to Achieve 09/19/17  -RB     LTG 1 Pt to be independent w/ long term HEP for strengthening, flexibility, and symptom mgmt  -RB     LTG 1 Progress Not Met  -RB     LTG 2 Pt to report at least a 50% reduction in pn  -RB     LTG 2 Progress Not Met  -RB     LTG 3 Pt to demo functional trunk ROM in all planes w/o increase in pn.  -RB     LTG 3 Progress Not Met  -RB     LTG 4 Pt to improve Mod Oswestry score by at least 8 pts to reflect improved pn and function.  -RB     LTG 4 Progress Not Met  -RB       User Key  (r) = Recorded By, (t) = Taken By, (c) = Cosigned By    Initials Name Provider Type    RB Daniela Gregory, PT Physical Therapist                PT Assessment/Plan       09/12/17 1640       PT Assessment    Functional Limitations Impaired locomotion;Limitation in home management;Limitations in functional capacity and performance;Performance in leisure activities;Performance in  self-care ADL  -RB     Impairments Muscle strength;Pain;Impaired postural alignment;Range of motion  -RB     Assessment Comments Pt has made no observable progress w/ PT to date. After 6 visits he continues to report B lumbosacral pn w/ B radiculopathy and has made no apparent improvements in strength, mobility, pn, or functional ability. Recent MRI demo multilevel degenerative changes w/ disc bulge at L4-5 w/ severe neuroforaminal narrowing causing nerve root compression, spinal canal narrowing. Pt discussing surgical intervention w/ MD.   -RB     Please refer to paper survey for additional self-reported information Yes  -RB     Rehab Potential Fair  -RB     Patient/caregiver participated in establishment of treatment plan and goals Yes  -RB     PT Plan    Predicted Duration of Therapy Intervention (days/wks) TBD  -RB     PT Plan Comments Will hold PT at this time and await MD recommendation.  -RB       User Key  (r) = Recorded By, (t) = Taken By, (c) = Cosigned By    Initials Name Provider Type    GAEL Gregory, PT Physical Therapist                  Exercises       09/12/17 0800          Subjective Comments    Subjective Comments Continues to have constant pn in the low back and buttocks, w/ sporadic shooting pains down the backs of both legs. Pn is worsened by standing, walking, improved w/ sitting or laying down with hips/knees bent. Saw Dr. Angeles who is recommending surgical intervention. Follows up again Thursday.  -RB      Subjective Pain    Able to rate subjective pain? yes  -RB      Pre-Treatment Pain Level 2  -RB      Post-Treatment Pain Level 2  -RB      Exercise 1    Exercise Name 1 Performed reassessment and reviewed HEP  -RB      Time (Minutes) 1 30  -RB        User Key  (r) = Recorded By, (t) = Taken By, (c) = Cosigned By    Initials Name Provider Type    GAEL Gregory, PT Physical Therapist                              Outcome Measures       09/12/17 1000          Modified Oswestry     Modified Oswestry Score/Comments 25/50  -RB      Functional Assessment    Outcome Measure Options Modifed Owestry  -RB        User Key  (r) = Recorded By, (t) = Taken By, (c) = Cosigned By    Initials Name Provider Type    RB Daniela Gregory, PT Physical Therapist            Time Calculation:   Start Time: 0830  Total Timed Code Minutes- PT: 30 minute(s)     Therapy Charges for Today     Code Description Service Date Service Provider Modifiers Qty    [id number] HC PT THER PROC EA 15 MIN 9/12/2017 Daniela Gregory, PT GP 2          PT G-Codes  Outcome Measure Options: Modifed Owestry         Daniela Gregory, PT  9/12/2017

## 2017-09-12 NOTE — PROGRESS NOTES
Chief Complaint   Patient presents with   • Follow-up     discuss recent appt with Neurosurgery       Subjective   Russell Soto is a 62 y.o. male.       History of Present Illness     Pt saw neurosurgeon and laminectomy was recommended based on MRI and pt's symptoms. He is considering whether he would like to proceed, discussed possibility of postponing surgery and it sounds like he will likely need it in the future with less options for repair. He has appt scheduled to go over questions with surgeon and his wife. He is now using a cane to avoid potential falls when the intermittent sharp pain occurs.    Pt has been monitoring his BP at home and notes that it increases when he takes the ibuprofen and in pain. He has seen numbers in the 130/70s when he is not in pain and not taking ibuprofen. Does tend to be close to 140s systolic. Asymptomatic with elevated readings.      Current Outpatient Prescriptions:   •  ibuprofen (ADVIL,MOTRIN) 400 MG tablet, Take 400 mg by mouth Every 6 (Six) Hours As Needed for Mild Pain (1-3)., Disp: , Rfl:   •  lisinopril (PRINIVIL,ZESTRIL) 5 MG tablet, Take 1 tablet by mouth Daily., Disp: 30 tablet, Rfl: 2     PMFSH  The following portions of the patient's history were reviewed and updated as appropriate: allergies, current medications, past family history, past medical history, past social history, past surgical history and problem list.    Review of Systems   Constitutional: Negative for appetite change, fever and unexpected weight change.   HENT: Negative.    Eyes: Negative for pain and visual disturbance.   Respiratory: Negative for chest tightness, shortness of breath and wheezing.    Cardiovascular: Negative for chest pain and palpitations.   Gastrointestinal: Negative for abdominal pain, blood in stool, diarrhea, nausea and vomiting.   Endocrine: Negative.    Genitourinary: Negative for difficulty urinating, flank pain, frequency and urgency.   Musculoskeletal: Positive for  back pain. Negative for joint swelling.   Skin: Negative for color change and rash.   Neurological: Negative for tremors and weakness.   Hematological: Negative for adenopathy.   Psychiatric/Behavioral: Negative for confusion and decreased concentration.   All other systems reviewed and are negative.      Objective   /70  Pulse 67  Wt 187 lb 9.8 oz (85.1 kg)  SpO2 99%  BMI 29.38 kg/m2    Physical Exam   Constitutional: He is oriented to person, place, and time. He appears well-developed and well-nourished.   HENT:   Head: Normocephalic and atraumatic.   Right Ear: External ear normal.   Left Ear: External ear normal.   Neck: Normal range of motion.   Cardiovascular: Normal rate and regular rhythm.    Pulmonary/Chest: Effort normal.   Musculoskeletal: Normal range of motion.   Neurological: He is alert and oriented to person, place, and time.   Skin: Skin is warm and dry.   Psychiatric: He has a normal mood and affect. His behavior is normal. Judgment and thought content normal.       Results for orders placed or performed in visit on 08/15/17   POCT urinalysis dipstick, automated   Result Value Ref Range    Color Yellow Yellow, Straw, Dark Yellow, Le[place]    Clarity, UA Clear Clear    Glucose, UA Negative Negative, 1000 mg/dL (3+) mg/dL    Bilirubin Negative Negative    Ketones, UA Negative Negative    Specific Gravity  1.010 1.005 - 1.030    Blood, UA Negative Negative    pH, Urine 6.0 5.0 - 8.0    Protein, POC Negative Negative mg/dL    Urobilinogen, UA Normal Normal    Leukocytes Negative Negative    Nitrite, UA Negative Negative   POC Glycosylated Hemoglobin (Hb A1C)   Result Value Ref Range    Hemoglobin A1C 5.1 %        ASSESSMENT/PLAN    Problem List Items Addressed This Visit        Cardiovascular and Mediastinum    Hypertension - Primary    Relevant Medications    lisinopril (PRINIVIL,ZESTRIL) 5 MG tablet       Other    Acute bilateral low back pain with bilateral sciatica     Sounds like pt is  leaning towards have the laminectomy and has confidence in surgeon. May consider getting a second opinion just to convince himself it needs to be done. Will keep upcoming appt with NS to go over questions.                    Return in about 4 weeks (around 10/10/2017) for Recheck.

## 2017-09-13 PROBLEM — I10 HYPERTENSION: Status: ACTIVE | Noted: 2017-09-13

## 2017-09-14 ENCOUNTER — TELEPHONE (OUTPATIENT)
Dept: NEUROSURGERY | Facility: CLINIC | Age: 62
End: 2017-09-14

## 2017-09-14 ENCOUNTER — OFFICE VISIT (OUTPATIENT)
Dept: NEUROSURGERY | Facility: CLINIC | Age: 62
End: 2017-09-14

## 2017-09-14 ENCOUNTER — PREP FOR SURGERY (OUTPATIENT)
Dept: OTHER | Facility: HOSPITAL | Age: 62
End: 2017-09-14

## 2017-09-14 VITALS
TEMPERATURE: 97.4 F | BODY MASS INDEX: 29.19 KG/M2 | DIASTOLIC BLOOD PRESSURE: 85 MMHG | WEIGHT: 186 LBS | HEIGHT: 67 IN | SYSTOLIC BLOOD PRESSURE: 122 MMHG

## 2017-09-14 DIAGNOSIS — M48.062 SPINAL STENOSIS, LUMBAR REGION, WITH NEUROGENIC CLAUDICATION: Primary | ICD-10-CM

## 2017-09-14 DIAGNOSIS — M54.41 ACUTE BILATERAL LOW BACK PAIN WITH BILATERAL SCIATICA: Primary | ICD-10-CM

## 2017-09-14 DIAGNOSIS — M54.42 ACUTE BILATERAL LOW BACK PAIN WITH BILATERAL SCIATICA: Primary | ICD-10-CM

## 2017-09-14 PROBLEM — A41.9 SEPSIS (HCC): Status: ACTIVE | Noted: 2017-09-14

## 2017-09-14 PROCEDURE — 99214 OFFICE O/P EST MOD 30 MIN: CPT | Performed by: NEUROLOGICAL SURGERY

## 2017-09-14 RX ORDER — IBUPROFEN 800 MG/1
800 TABLET ORAL ONCE
Status: CANCELLED | OUTPATIENT
Start: 2017-09-14 | End: 2017-09-14

## 2017-09-14 RX ORDER — CHLORHEXIDINE GLUCONATE 4 G/100ML
SOLUTION TOPICAL
Qty: 120 ML | Refills: 0 | Status: SHIPPED | OUTPATIENT
Start: 2017-09-14 | End: 2017-10-05 | Stop reason: HOSPADM

## 2017-09-14 RX ORDER — FAMOTIDINE 20 MG/1
20 TABLET, FILM COATED ORAL
Status: CANCELLED | OUTPATIENT
Start: 2017-09-14

## 2017-09-14 RX ORDER — CEFAZOLIN SODIUM 2 G/100ML
2 INJECTION, SOLUTION INTRAVENOUS ONCE
Status: CANCELLED | OUTPATIENT
Start: 2017-09-14 | End: 2017-09-14

## 2017-09-14 RX ORDER — ACETAMINOPHEN 325 MG/1
650 TABLET ORAL ONCE
Status: CANCELLED | OUTPATIENT
Start: 2017-09-14 | End: 2017-09-14

## 2017-09-14 RX ORDER — SODIUM CHLORIDE 0.9 % (FLUSH) 0.9 %
1-10 SYRINGE (ML) INJECTION AS NEEDED
Status: CANCELLED | OUTPATIENT
Start: 2017-09-14

## 2017-09-14 RX ORDER — HYDROCODONE BITARTRATE AND ACETAMINOPHEN 7.5; 325 MG/1; MG/1
1 TABLET ORAL ONCE
Status: CANCELLED | OUTPATIENT
Start: 2017-09-14 | End: 2017-09-14

## 2017-09-14 NOTE — ASSESSMENT & PLAN NOTE
Sounds like pt is leaning towards have the laminectomy and has confidence in surgeon. May consider getting a second opinion just to convince himself it needs to be done. Will keep upcoming appt with NS to go over questions.

## 2017-09-14 NOTE — PROGRESS NOTES
NAME: DONTE EUCEDA   DOS: 2017  : 1955  PCP: RICCO Brown    Chief Complaint:  Back pain lower extremity pain  Chief Complaint   Patient presents with   • Back Pain       History of Present Illness:  62 y.o. male   Follow-up with wife to discuss surgery patient still having bilateral pain in the legs today there is not really a side to it it's more both sides consider with jolts etc.    PMHX  Allergies:  Allergies   Allergen Reactions   • Sulfa Antibiotics      Medications    Current Outpatient Prescriptions:   •  ibuprofen (ADVIL,MOTRIN) 400 MG tablet, Take 400 mg by mouth Every 6 (Six) Hours As Needed for Mild Pain (1-3)., Disp: , Rfl:   •  lisinopril (PRINIVIL,ZESTRIL) 5 MG tablet, Take 1 tablet by mouth Daily., Disp: 30 tablet, Rfl: 2  Past Medical History:  Past Medical History:   Diagnosis Date   • Dizziness    • Rectal hemorrhage      Past Surgical History:  Past Surgical History:   Procedure Laterality Date   • PROSTATE SURGERY       Social Hx:  Social History   Substance Use Topics   • Smoking status: Never Smoker   • Smokeless tobacco: Never Used   • Alcohol use Yes      Comment: Social     Family Hx:  Family History   Problem Relation Age of Onset   • Diabetes Mother      Review of Systems:        Review of Systems   Constitutional: Negative for activity change, appetite change, chills, diaphoresis, fatigue, fever and unexpected weight change.   HENT: Negative for congestion, dental problem, drooling, ear discharge, ear pain, facial swelling, hearing loss, mouth sores, nosebleeds, postnasal drip, rhinorrhea, sinus pressure, sneezing, sore throat, tinnitus, trouble swallowing and voice change.    Eyes: Negative for photophobia, pain, discharge, redness, itching and visual disturbance.   Respiratory: Negative for apnea, cough, choking, chest tightness, shortness of breath, wheezing and stridor.    Cardiovascular: Negative for chest pain, palpitations and leg swelling.    Gastrointestinal: Negative for abdominal distention, abdominal pain, anal bleeding, blood in stool, constipation, diarrhea, nausea, rectal pain and vomiting.   Endocrine: Negative for cold intolerance, heat intolerance, polydipsia, polyphagia and polyuria.   Genitourinary: Negative for decreased urine volume, difficulty urinating, dysuria, enuresis, flank pain, frequency, genital sores, hematuria and urgency.   Musculoskeletal: Positive for back pain and myalgias. Negative for arthralgias, gait problem, joint swelling, neck pain and neck stiffness.   Skin: Negative for color change, pallor, rash and wound.   Allergic/Immunologic: Negative for environmental allergies, food allergies and immunocompromised state.   Neurological: Negative for dizziness, tremors, seizures, syncope, facial asymmetry, speech difficulty, weakness, light-headedness, numbness and headaches.   Hematological: Negative for adenopathy. Does not bruise/bleed easily.   Psychiatric/Behavioral: Negative for agitation, behavioral problems, confusion, decreased concentration, dysphoric mood, hallucinations, self-injury, sleep disturbance and suicidal ideas. The patient is not nervous/anxious and is not hyperactive.    All other systems reviewed and are negative.         I reviewed the patient's past medical history and review of systems family history social history etc. per the medical record  Physical Examination:  Vitals:    09/14/17 1509   BP: 122/85   Temp: 97.4 °F (36.3 °C)      General Appearance:   Well developed, well nourished, well groomed, alert, and cooperative.  Neurological examination:  Neurologic Exam  Reasonable dorsalis pedis pulses legs are warm and well-perfused negative straight leg raise signs good strength and legs    Review of Imaging/DATA:  Reviewed lumbar flexion extension films that show no listhesis  Diagnoses/Plan:    Mr. Soto is a 62 y.o. male   He does have a high degree of intraforaminal disease at the left-sided  L2-3 revision he's had a disc at the left-sided L4 5 region he's get a intraforaminal disease and a right sided L3 4 he has intraforaminal disease I discussed with him all the options I think given his age and his symptomatology is MRI findings at think it's reasonable to begin with a simple laminectomy at L2 3 and 4 I discussed that we may perform a discectomy at the to 3 level etc.  The reoperation rate given the complexity of the spine.  I spent at least 45 minutes with him talking about the surgical procedure risks benefits etc.

## 2017-09-18 PROBLEM — M48.062 SPINAL STENOSIS, LUMBAR REGION, WITH NEUROGENIC CLAUDICATION: Status: ACTIVE | Noted: 2017-09-18

## 2017-09-20 ENCOUNTER — APPOINTMENT (OUTPATIENT)
Dept: PHYSICAL THERAPY | Facility: HOSPITAL | Age: 62
End: 2017-09-20

## 2017-09-21 ENCOUNTER — TELEPHONE (OUTPATIENT)
Dept: NEUROSURGERY | Facility: CLINIC | Age: 62
End: 2017-09-21

## 2017-09-21 NOTE — TELEPHONE ENCOUNTER
Provider:  Karthik  Caller: Russell Soto  Time of call:   09:51 AM  Phone #:  [phone]  Surgery:  LUMBAR LAMINECTOMY  L2, L3, L4, POSSIBLE DISCECTOMY  Surgery Date:  10/04/17  Last visit:   09/14/17  Next visit: upcoming surgery    Reason for call:       Pt wants to know what kind of shape he'll be in when he gets home from surgery. Will he be able to go up and down steps? Will he need a wheel chair?    Pt asked that we leave a detailed message answering these questions if he doesn't answer his phone

## 2017-10-01 ENCOUNTER — APPOINTMENT (OUTPATIENT)
Dept: PREADMISSION TESTING | Facility: HOSPITAL | Age: 62
End: 2017-10-01

## 2017-10-01 VITALS — WEIGHT: 186.51 LBS | BODY MASS INDEX: 29.27 KG/M2 | HEIGHT: 67 IN

## 2017-10-01 DIAGNOSIS — M48.062 SPINAL STENOSIS, LUMBAR REGION, WITH NEUROGENIC CLAUDICATION: ICD-10-CM

## 2017-10-01 LAB
ANION GAP SERPL CALCULATED.3IONS-SCNC: 5 MMOL/L (ref 3–11)
BASOPHILS # BLD AUTO: 0.06 10*3/MM3 (ref 0–0.2)
BASOPHILS NFR BLD AUTO: 1 % (ref 0–1)
BUN BLD-MCNC: 11 MG/DL (ref 9–23)
BUN/CREAT SERPL: 15.7 (ref 7–25)
CALCIUM SPEC-SCNC: 9.4 MG/DL (ref 8.7–10.4)
CHLORIDE SERPL-SCNC: 104 MMOL/L (ref 99–109)
CO2 SERPL-SCNC: 29 MMOL/L (ref 20–31)
CREAT BLD-MCNC: 0.7 MG/DL (ref 0.6–1.3)
DEPRECATED RDW RBC AUTO: 41.3 FL (ref 37–54)
EOSINOPHIL # BLD AUTO: 0.17 10*3/MM3 (ref 0–0.3)
EOSINOPHIL NFR BLD AUTO: 2.9 % (ref 0–3)
ERYTHROCYTE [DISTWIDTH] IN BLOOD BY AUTOMATED COUNT: 12.2 % (ref 11.3–14.5)
GFR SERPL CREATININE-BSD FRML MDRD: 114 ML/MIN/1.73
GLUCOSE BLD-MCNC: 123 MG/DL (ref 70–100)
HCT VFR BLD AUTO: 37.9 % (ref 38.9–50.9)
HGB BLD-MCNC: 13.5 G/DL (ref 13.1–17.5)
IMM GRANULOCYTES # BLD: 0.01 10*3/MM3 (ref 0–0.03)
IMM GRANULOCYTES NFR BLD: 0.2 % (ref 0–0.6)
INR PPP: 0.94
LYMPHOCYTES # BLD AUTO: 1.36 10*3/MM3 (ref 0.6–4.8)
LYMPHOCYTES NFR BLD AUTO: 22.9 % (ref 24–44)
MCH RBC QN AUTO: 32.9 PG (ref 27–31)
MCHC RBC AUTO-ENTMCNC: 35.6 G/DL (ref 32–36)
MCV RBC AUTO: 92.4 FL (ref 80–99)
MONOCYTES # BLD AUTO: 0.56 10*3/MM3 (ref 0–1)
MONOCYTES NFR BLD AUTO: 9.4 % (ref 0–12)
NEUTROPHILS # BLD AUTO: 3.79 10*3/MM3 (ref 1.5–8.3)
NEUTROPHILS NFR BLD AUTO: 63.6 % (ref 41–71)
PLATELET # BLD AUTO: 227 10*3/MM3 (ref 150–450)
PMV BLD AUTO: 9.4 FL (ref 6–12)
POTASSIUM BLD-SCNC: 4.1 MMOL/L (ref 3.5–5.5)
PROTHROMBIN TIME: 10.2 SECONDS (ref 9.6–11.5)
RBC # BLD AUTO: 4.1 10*6/MM3 (ref 4.2–5.76)
SODIUM BLD-SCNC: 138 MMOL/L (ref 132–146)
WBC NRBC COR # BLD: 5.95 10*3/MM3 (ref 3.5–10.8)

## 2017-10-01 PROCEDURE — 93010 ELECTROCARDIOGRAM REPORT: CPT | Performed by: INTERNAL MEDICINE

## 2017-10-01 NOTE — PAT
Cuong stover measurements:  Length: 22  Width: 18  RX for Chlorhexidine Gluconate and Bactroban from Dr. Angeles's office given to patient in PAT. Instructions given; patient verbalized understanding.

## 2017-10-03 LAB — MRSA SPEC QL CULT: NORMAL

## 2017-10-04 ENCOUNTER — ANESTHESIA EVENT (OUTPATIENT)
Dept: PERIOP | Facility: HOSPITAL | Age: 62
End: 2017-10-04

## 2017-10-04 ENCOUNTER — APPOINTMENT (OUTPATIENT)
Dept: GENERAL RADIOLOGY | Facility: HOSPITAL | Age: 62
End: 2017-10-04

## 2017-10-04 ENCOUNTER — HOSPITAL ENCOUNTER (OUTPATIENT)
Facility: HOSPITAL | Age: 62
Setting detail: OBSERVATION
LOS: 1 days | Discharge: HOME | End: 2017-10-05
Attending: NEUROLOGICAL SURGERY | Admitting: NEUROLOGICAL SURGERY

## 2017-10-04 ENCOUNTER — ANESTHESIA (OUTPATIENT)
Dept: PERIOP | Facility: HOSPITAL | Age: 62
End: 2017-10-04

## 2017-10-04 DIAGNOSIS — M48.062 SPINAL STENOSIS, LUMBAR REGION, WITH NEUROGENIC CLAUDICATION: ICD-10-CM

## 2017-10-04 DIAGNOSIS — Z74.09 IMPAIRED FUNCTIONAL MOBILITY, BALANCE, GAIT, AND ENDURANCE: Primary | ICD-10-CM

## 2017-10-04 PROCEDURE — G0378 HOSPITAL OBSERVATION PER HR: HCPCS

## 2017-10-04 PROCEDURE — 25010000002 PROPOFOL 10 MG/ML EMULSION: Performed by: NURSE ANESTHETIST, CERTIFIED REGISTERED

## 2017-10-04 PROCEDURE — 76000 FLUOROSCOPY <1 HR PHYS/QHP: CPT

## 2017-10-04 PROCEDURE — 25010000003 CEFAZOLIN IN DEXTROSE 2-4 GM/100ML-% SOLUTION: Performed by: NEUROLOGICAL SURGERY

## 2017-10-04 PROCEDURE — 63048 LAM FACETEC &FORAMOT EA ADDL: CPT | Performed by: NEUROLOGICAL SURGERY

## 2017-10-04 PROCEDURE — 63047 LAM FACETEC & FORAMOT LUMBAR: CPT | Performed by: NEUROLOGICAL SURGERY

## 2017-10-04 PROCEDURE — 25010000002 DEXAMETHASONE PER 1 MG: Performed by: NURSE ANESTHETIST, CERTIFIED REGISTERED

## 2017-10-04 PROCEDURE — 76001 HC FLUORO GREATER THAN 1 HOUR: CPT

## 2017-10-04 PROCEDURE — 25010000002 ONDANSETRON PER 1 MG: Performed by: NURSE ANESTHETIST, CERTIFIED REGISTERED

## 2017-10-04 PROCEDURE — 25010000002 NEOSTIGMINE PER 0.5 MG: Performed by: NURSE ANESTHETIST, CERTIFIED REGISTERED

## 2017-10-04 DEVICE — DURAGEN® PLUS DURAL REGENERATION MATRIX, 1 IN X 3 IN (2.5 CM X 7.5 CM)
Type: IMPLANTABLE DEVICE | Site: DURA | Status: FUNCTIONAL
Brand: DURAGEN® PLUS

## 2017-10-04 RX ORDER — ESMOLOL HYDROCHLORIDE 10 MG/ML
INJECTION INTRAVENOUS AS NEEDED
Status: DISCONTINUED | OUTPATIENT
Start: 2017-10-04 | End: 2017-10-04 | Stop reason: SURG

## 2017-10-04 RX ORDER — SODIUM CHLORIDE 0.9 % (FLUSH) 0.9 %
1-10 SYRINGE (ML) INJECTION AS NEEDED
Status: DISCONTINUED | OUTPATIENT
Start: 2017-10-04 | End: 2017-10-04 | Stop reason: HOSPADM

## 2017-10-04 RX ORDER — PROMETHAZINE HYDROCHLORIDE 25 MG/ML
6.25 INJECTION, SOLUTION INTRAMUSCULAR; INTRAVENOUS ONCE AS NEEDED
Status: DISCONTINUED | OUTPATIENT
Start: 2017-10-04 | End: 2017-10-04 | Stop reason: HOSPADM

## 2017-10-04 RX ORDER — IBUPROFEN 400 MG/1
400 TABLET ORAL 3 TIMES DAILY
Status: DISCONTINUED | OUTPATIENT
Start: 2017-10-04 | End: 2017-10-05 | Stop reason: HOSPADM

## 2017-10-04 RX ORDER — ACETAMINOPHEN 325 MG/1
650 TABLET ORAL ONCE
Status: COMPLETED | OUTPATIENT
Start: 2017-10-04 | End: 2017-10-04

## 2017-10-04 RX ORDER — ATRACURIUM BESYLATE 10 MG/ML
INJECTION, SOLUTION INTRAVENOUS AS NEEDED
Status: DISCONTINUED | OUTPATIENT
Start: 2017-10-04 | End: 2017-10-04 | Stop reason: SURG

## 2017-10-04 RX ORDER — IBUPROFEN 800 MG/1
800 TABLET ORAL ONCE
Status: COMPLETED | OUTPATIENT
Start: 2017-10-04 | End: 2017-10-04

## 2017-10-04 RX ORDER — PROPOFOL 10 MG/ML
VIAL (ML) INTRAVENOUS CONTINUOUS PRN
Status: DISCONTINUED | OUTPATIENT
Start: 2017-10-04 | End: 2017-10-04 | Stop reason: SURG

## 2017-10-04 RX ORDER — DEXAMETHASONE SODIUM PHOSPHATE 4 MG/ML
INJECTION, SOLUTION INTRA-ARTICULAR; INTRALESIONAL; INTRAMUSCULAR; INTRAVENOUS; SOFT TISSUE AS NEEDED
Status: DISCONTINUED | OUTPATIENT
Start: 2017-10-04 | End: 2017-10-04 | Stop reason: SURG

## 2017-10-04 RX ORDER — PROMETHAZINE HYDROCHLORIDE 25 MG/1
25 TABLET ORAL ONCE AS NEEDED
Status: DISCONTINUED | OUTPATIENT
Start: 2017-10-04 | End: 2017-10-04 | Stop reason: HOSPADM

## 2017-10-04 RX ORDER — FIBRINOGEN HUMAN AND THROMBIN HUMAN 90; 500 [IU]/ML; [IU]/ML
SOLUTION TOPICAL AS NEEDED
Status: DISCONTINUED | OUTPATIENT
Start: 2017-10-04 | End: 2017-10-04 | Stop reason: HOSPADM

## 2017-10-04 RX ORDER — GLYCOPYRROLATE 0.2 MG/ML
INJECTION INTRAMUSCULAR; INTRAVENOUS AS NEEDED
Status: DISCONTINUED | OUTPATIENT
Start: 2017-10-04 | End: 2017-10-04 | Stop reason: SURG

## 2017-10-04 RX ORDER — FENTANYL CITRATE 50 UG/ML
50 INJECTION, SOLUTION INTRAMUSCULAR; INTRAVENOUS
Status: DISCONTINUED | OUTPATIENT
Start: 2017-10-04 | End: 2017-10-04 | Stop reason: HOSPADM

## 2017-10-04 RX ORDER — TEMAZEPAM 15 MG/1
15 CAPSULE ORAL NIGHTLY PRN
Status: DISCONTINUED | OUTPATIENT
Start: 2017-10-04 | End: 2017-10-05 | Stop reason: HOSPADM

## 2017-10-04 RX ORDER — NALOXONE HCL 0.4 MG/ML
0.4 VIAL (ML) INJECTION
Status: DISCONTINUED | OUTPATIENT
Start: 2017-10-04 | End: 2017-10-05 | Stop reason: HOSPADM

## 2017-10-04 RX ORDER — POLYETHYLENE GLYCOL 3350 17 G/17G
17 POWDER, FOR SOLUTION ORAL DAILY PRN
Status: DISCONTINUED | OUTPATIENT
Start: 2017-10-04 | End: 2017-10-05 | Stop reason: HOSPADM

## 2017-10-04 RX ORDER — CEFAZOLIN SODIUM 2 G/100ML
2 INJECTION, SOLUTION INTRAVENOUS ONCE
Status: COMPLETED | OUTPATIENT
Start: 2017-10-04 | End: 2017-10-04

## 2017-10-04 RX ORDER — MAGNESIUM HYDROXIDE 1200 MG/15ML
LIQUID ORAL AS NEEDED
Status: DISCONTINUED | OUTPATIENT
Start: 2017-10-04 | End: 2017-10-04 | Stop reason: HOSPADM

## 2017-10-04 RX ORDER — HYDROCODONE BITARTRATE AND ACETAMINOPHEN 7.5; 325 MG/1; MG/1
1 TABLET ORAL EVERY 4 HOURS PRN
Status: DISCONTINUED | OUTPATIENT
Start: 2017-10-04 | End: 2017-10-05 | Stop reason: HOSPADM

## 2017-10-04 RX ORDER — PROPOFOL 10 MG/ML
VIAL (ML) INTRAVENOUS AS NEEDED
Status: DISCONTINUED | OUTPATIENT
Start: 2017-10-04 | End: 2017-10-04 | Stop reason: SURG

## 2017-10-04 RX ORDER — BUPIVACAINE HYDROCHLORIDE 2.5 MG/ML
INJECTION, SOLUTION EPIDURAL; INFILTRATION; INTRACAUDAL AS NEEDED
Status: DISCONTINUED | OUTPATIENT
Start: 2017-10-04 | End: 2017-10-04 | Stop reason: HOSPADM

## 2017-10-04 RX ORDER — FAMOTIDINE 20 MG/1
20 TABLET, FILM COATED ORAL ONCE
Status: COMPLETED | OUTPATIENT
Start: 2017-10-04 | End: 2017-10-04

## 2017-10-04 RX ORDER — LIDOCAINE HYDROCHLORIDE 10 MG/ML
0.5 INJECTION, SOLUTION EPIDURAL; INFILTRATION; INTRACAUDAL; PERINEURAL ONCE AS NEEDED
Status: COMPLETED | OUTPATIENT
Start: 2017-10-04 | End: 2017-10-04

## 2017-10-04 RX ORDER — PROMETHAZINE HYDROCHLORIDE 25 MG/1
25 SUPPOSITORY RECTAL ONCE AS NEEDED
Status: DISCONTINUED | OUTPATIENT
Start: 2017-10-04 | End: 2017-10-04 | Stop reason: HOSPADM

## 2017-10-04 RX ORDER — ONDANSETRON 2 MG/ML
INJECTION INTRAMUSCULAR; INTRAVENOUS AS NEEDED
Status: DISCONTINUED | OUTPATIENT
Start: 2017-10-04 | End: 2017-10-04 | Stop reason: SURG

## 2017-10-04 RX ORDER — SODIUM CHLORIDE, SODIUM LACTATE, POTASSIUM CHLORIDE, CALCIUM CHLORIDE 600; 310; 30; 20 MG/100ML; MG/100ML; MG/100ML; MG/100ML
9 INJECTION, SOLUTION INTRAVENOUS CONTINUOUS
Status: DISCONTINUED | OUTPATIENT
Start: 2017-10-04 | End: 2017-10-05 | Stop reason: HOSPADM

## 2017-10-04 RX ORDER — HYDROMORPHONE HYDROCHLORIDE 1 MG/ML
0.5 INJECTION, SOLUTION INTRAMUSCULAR; INTRAVENOUS; SUBCUTANEOUS
Status: DISCONTINUED | OUTPATIENT
Start: 2017-10-04 | End: 2017-10-05 | Stop reason: HOSPADM

## 2017-10-04 RX ORDER — HYDROCODONE BITARTRATE AND ACETAMINOPHEN 7.5; 325 MG/1; MG/1
1 TABLET ORAL ONCE
Status: COMPLETED | OUTPATIENT
Start: 2017-10-04 | End: 2017-10-04

## 2017-10-04 RX ORDER — LIDOCAINE HYDROCHLORIDE AND EPINEPHRINE 5; 5 MG/ML; UG/ML
INJECTION, SOLUTION INFILTRATION; PERINEURAL AS NEEDED
Status: DISCONTINUED | OUTPATIENT
Start: 2017-10-04 | End: 2017-10-04 | Stop reason: HOSPADM

## 2017-10-04 RX ORDER — EPHEDRINE SULFATE 50 MG/ML
5 INJECTION, SOLUTION INTRAVENOUS ONCE AS NEEDED
Status: DISCONTINUED | OUTPATIENT
Start: 2017-10-04 | End: 2017-10-04 | Stop reason: HOSPADM

## 2017-10-04 RX ORDER — ONDANSETRON 2 MG/ML
4 INJECTION INTRAMUSCULAR; INTRAVENOUS EVERY 6 HOURS PRN
Status: DISCONTINUED | OUTPATIENT
Start: 2017-10-04 | End: 2017-10-05 | Stop reason: HOSPADM

## 2017-10-04 RX ORDER — CEFAZOLIN SODIUM 2 G/100ML
2 INJECTION, SOLUTION INTRAVENOUS EVERY 8 HOURS
Status: COMPLETED | OUTPATIENT
Start: 2017-10-04 | End: 2017-10-05

## 2017-10-04 RX ORDER — FAMOTIDINE 10 MG/ML
20 INJECTION, SOLUTION INTRAVENOUS ONCE
Status: CANCELLED | OUTPATIENT
Start: 2017-10-04 | End: 2017-10-04

## 2017-10-04 RX ORDER — SODIUM CHLORIDE 0.9 % (FLUSH) 0.9 %
1-10 SYRINGE (ML) INJECTION AS NEEDED
Status: DISCONTINUED | OUTPATIENT
Start: 2017-10-04 | End: 2017-10-05 | Stop reason: HOSPADM

## 2017-10-04 RX ORDER — ACETAMINOPHEN 325 MG/1
650 TABLET ORAL EVERY 4 HOURS PRN
Status: DISCONTINUED | OUTPATIENT
Start: 2017-10-04 | End: 2017-10-05 | Stop reason: HOSPADM

## 2017-10-04 RX ORDER — LIDOCAINE HYDROCHLORIDE 10 MG/ML
INJECTION, SOLUTION INFILTRATION; PERINEURAL AS NEEDED
Status: DISCONTINUED | OUTPATIENT
Start: 2017-10-04 | End: 2017-10-04 | Stop reason: SURG

## 2017-10-04 RX ADMIN — IBUPROFEN 400 MG: 400 TABLET ORAL at 20:25

## 2017-10-04 RX ADMIN — IBUPROFEN 400 MG: 400 TABLET ORAL at 16:28

## 2017-10-04 RX ADMIN — LIDOCAINE HYDROCHLORIDE 50 MG: 10 INJECTION, SOLUTION INFILTRATION; PERINEURAL at 08:34

## 2017-10-04 RX ADMIN — LIDOCAINE HYDROCHLORIDE 0.2 ML: 10 INJECTION, SOLUTION EPIDURAL; INFILTRATION; INTRACAUDAL; PERINEURAL at 06:54

## 2017-10-04 RX ADMIN — EPHEDRINE SULFATE 10 MG: 50 INJECTION INTRAMUSCULAR; INTRAVENOUS; SUBCUTANEOUS at 08:51

## 2017-10-04 RX ADMIN — DEXAMETHASONE SODIUM PHOSPHATE 8 MG: 4 INJECTION, SOLUTION INTRAMUSCULAR; INTRAVENOUS at 08:34

## 2017-10-04 RX ADMIN — HYDROCODONE BITARTRATE AND ACETAMINOPHEN 1 TABLET: 7.5; 325 TABLET ORAL at 07:00

## 2017-10-04 RX ADMIN — ATRACURIUM BESYLATE 40 MG: 10 INJECTION, SOLUTION INTRAVENOUS at 08:34

## 2017-10-04 RX ADMIN — ACETAMINOPHEN 650 MG: 325 TABLET ORAL at 07:00

## 2017-10-04 RX ADMIN — PROPOFOL 25 MCG/KG/MIN: 10 INJECTION, EMULSION INTRAVENOUS at 08:40

## 2017-10-04 RX ADMIN — ONDANSETRON 4 MG: 2 INJECTION INTRAMUSCULAR; INTRAVENOUS at 10:44

## 2017-10-04 RX ADMIN — EPHEDRINE SULFATE 10 MG: 50 INJECTION INTRAMUSCULAR; INTRAVENOUS; SUBCUTANEOUS at 08:48

## 2017-10-04 RX ADMIN — SODIUM CHLORIDE, POTASSIUM CHLORIDE, SODIUM LACTATE AND CALCIUM CHLORIDE 9 ML/HR: 600; 310; 30; 20 INJECTION, SOLUTION INTRAVENOUS at 06:54

## 2017-10-04 RX ADMIN — CEFAZOLIN SODIUM 2 G: 2 INJECTION, SOLUTION INTRAVENOUS at 08:28

## 2017-10-04 RX ADMIN — IBUPROFEN 800 MG: 800 TABLET, FILM COATED ORAL at 07:00

## 2017-10-04 RX ADMIN — FAMOTIDINE 20 MG: 20 TABLET ORAL at 06:54

## 2017-10-04 RX ADMIN — CEFAZOLIN SODIUM 2 G: 2 INJECTION, SOLUTION INTRAVENOUS at 16:28

## 2017-10-04 RX ADMIN — Medication 4 MG: at 10:44

## 2017-10-04 RX ADMIN — ATRACURIUM BESYLATE 10 MG: 10 INJECTION, SOLUTION INTRAVENOUS at 09:45

## 2017-10-04 RX ADMIN — PROPOFOL 150 MG: 10 INJECTION, EMULSION INTRAVENOUS at 08:34

## 2017-10-04 RX ADMIN — GLYCOPYRROLATE 0.4 MG: 0.2 INJECTION, SOLUTION INTRAMUSCULAR; INTRAVENOUS at 10:44

## 2017-10-04 RX ADMIN — ESMOLOL HYDROCHLORIDE 40 MG: 10 INJECTION, SOLUTION INTRAVENOUS at 08:34

## 2017-10-04 NOTE — PLAN OF CARE
Problem: Patient Care Overview (Adult)  Goal: Plan of Care Review  Outcome: Ongoing (interventions implemented as appropriate)    10/04/17 1816   Coping/Psychosocial Response Interventions   Plan Of Care Reviewed With patient   Patient Care Overview   Progress improving   Outcome Evaluation   Outcome Summary/Follow up Plan Patient arrived to room s/p lumar lami, a/o x4. LORD, denies n/t or pain. VSS, scds/teds to ble. Voiding wnl per urinal, tolerating diet well.          Problem: Perioperative Period (Adult)  Goal: Signs and Symptoms of Listed Potential Problems Will be Absent or Manageable (Perioperative Period)  Outcome: Ongoing (interventions implemented as appropriate)    10/04/17 1816   Perioperative Period   Problems Assessed (Perioperative Period) all   Problems Present (Perioperative Period) pain         Problem: Laminectomy/Foraminotomy/Discectomy (Adult)  Goal: Signs and Symptoms of Listed Potential Problems Will be Absent or Manageable (Laminectomy/Foraminotomy/Discectomy)  Outcome: Ongoing (interventions implemented as appropriate)    10/04/17 1816   Laminectomy/Foraminotomy/Discectomy   Problems Assessed (Laminectomy/Laminotomy/Discectomy) all   Problems Present (Laminectomy/Laminotomy/Discectomy) pain

## 2017-10-04 NOTE — ANESTHESIA PROCEDURE NOTES
Airway  Urgency: elective    Airway not difficult    General Information and Staff    Patient location during procedure: OR  CRNA: ALONSO BOWSER    Indications and Patient Condition  Indications for airway management: airway protection    Preoxygenated: yes  MILS not maintained throughout  Mask difficulty assessment: 1 - vent by mask    Final Airway Details  Final airway type: endotracheal airway      Successful airway: ETT  Cuffed: yes   Successful intubation technique: direct laryngoscopy  Endotracheal tube insertion site: oral  Blade: Rhiannon  Blade size: #3  ETT size: 7.0 mm  Cormack-Lehane Classification: grade I - full view of glottis  Placement verified by: chest auscultation and capnometry   Cuff volume (mL): 8  Measured from: lips  ETT to lips (cm): 20  Number of attempts at approach: 1    Additional Comments  Negative epigastric sounds, Breath sound equal bilaterally with symmetric chest rise and fall. Atraumatic, no damage to lips or teeth during intubation

## 2017-10-04 NOTE — ANESTHESIA POSTPROCEDURE EVALUATION
Patient: Russell Soto    Procedure Summary     Date Anesthesia Start Anesthesia Stop Room / Location    10/04/17 0828 1114 [facility] OR 19 / [facility] OR       Procedure Diagnosis Surgeon Provider    LUMBAR LAMINECTOMY  L2, L3, L4, POSSIBLE DISCECTOMY (N/A Spine Lumbar) Spinal stenosis, lumbar region, with neurogenic claudication  (Spinal stenosis, lumbar region, with neurogenic claudication [M48.06]) MD Elvin Urban MD          Anesthesia Type: general  Last vitals  BP    146/88   Temp     98   Pulse    67   Resp     16   SpO2 99         Post Anesthesia Care and Evaluation    Patient location during evaluation: PACU  Patient participation: complete - patient participated  Level of consciousness: awake and alert  Pain score: 0  Pain management: adequate  Airway patency: patent  Anesthetic complications: No anesthetic complications  PONV Status: none  Cardiovascular status: hemodynamically stable and acceptable  Respiratory status: nonlabored ventilation, acceptable and nasal cannula  Hydration status: acceptable

## 2017-10-04 NOTE — ANESTHESIA PREPROCEDURE EVALUATION
Anesthesia Evaluation     Patient summary reviewed and Nursing notes reviewed          Airway   Mallampati: I  TM distance: >3 FB  Neck ROM: full  Dental      Pulmonary - negative pulmonary ROS   Cardiovascular     ECG reviewed    (+) hypertension, hyperlipidemia      Neuro/Psych  (+) dizziness/light headedness,    GI/Hepatic/Renal/Endo - negative ROS     Musculoskeletal (-) negative ROS    Abdominal    Substance History - negative use     OB/GYN negative ob/gyn ROS         Other                                        Anesthesia Plan    ASA 2     general     intravenous induction   Anesthetic plan and risks discussed with patient.    Plan discussed with CRNA.

## 2017-10-04 NOTE — OP NOTE
Operation note      Preoperative diagnosis spinal stenosis at the L3 4 and L4 5 levels  Postoperative diagnosis same    Procedures performed left-sided approach, L3, L4 unilateral approach with undercutting of the lamina bilaterally bilateral lamina foraminotomy at L3 4 L4 5  Undercutting unilateral left L2    Surgeon: Romeo Angeles MD     Assistants: El Westfall    Anesthesia: Normal endotracheal anesthesia    ASA Class: 2    Findings hard disc very significant stenosis particularly at the 45 level with a unilateral L4 5 probable synovial cyst adherent to the left traversing area incidental durotomy noted 3 mm left L4 5 area  20 cc blood loss microscope used    Procedure in detail after formal written consent was obtained the patient was taken to the operating room endotracheally intubated given preoperative antimicrobial prophylaxis they were prepped and draped in the usual sterile manner all bony prominences and genitalia were padded to prevent neurologic injury.    At this point in time the patient was given local anesthesia at the operative level fluoroscopic guidance confirmed as 45 the correct level and unilateral \approach tractor system was placed on the lamina facet complex to ensure adequate exposure.    At this point time the hemilaminotomy was performed first at the L4 and superior L5 area hemilaminectomy was completed and the L3 laminectomy was performed the yellow ligament was removed in the midline and then attention was turned to the contralateral side at which times a microscope was used to undercut the ligament facet complex.  After this point in time attention was turned unilaterally at L4 5 area particularly on the left the ligament was noted to be densely adherent there is a sharp and duration of bone just under this upon removing ligament and a durotomy was noted approximately to 3 mm in length.  A 6-0 Prolene was used to repair this and then some Tisseel was placed as well some  DuraGen over the laminectomy deficit.  A ball probe easily passed and all the neural foramina midline decompression was confirmed  At this point in time after incision of the disc, a large ligamentous fragment as well as cartilaginous fragment were noted and adequate neural decompression was confirmed.    At the resolution the case meticulous hemostasis was maintained and in the incision was closed in layers I was present personally performed the entirety of the procedure until the skin closure.

## 2017-10-04 NOTE — H&P
Pre-Op H&P  Russell Soto  [id number]  1955    Date of Service: 10/5/2017    Chief complaint: Bilateral leg pain, L>R    HPI:  Patient is a 62 y.o.male presents with 4-5 month history of bilateral leg pain. He does describe some new numbness in left inner thigh since last seen, otherwise no changes in presenting symptoms.  No recent fever or chills     Review of Systems:  General ROS: negative for chills, fever or skin lesions;  No changes since last office visit  Cardiovascular ROS: no chest pain or dyspnea on exertion  Respiratory ROS: no cough, shortness of breath, or wheezing    Allergies:   Allergies   Allergen Reactions   • Sulfa Antibiotics Rash       Home Meds:    Prescriptions Prior to Admission   Medication Sig Dispense Refill Last Dose   • chlorhexidine (HIBICLENS) 4 % external liquid Shower each day with solution for 5 days beginning 5 days before surgery. 120 mL 0 10/3/2017 at 2100   • ibuprofen (ADVIL,MOTRIN) 400 MG tablet Take 400 mg by mouth 3 (Three) Times a Day.   10/3/2017 at 1900   • lisinopril (PRINIVIL,ZESTRIL) 5 MG tablet Take 1 tablet by mouth Daily. 30 tablet 2 10/4/2017 at 0530   • mupirocin (BACTROBAN) 2 % nasal ointment Apply to the inside of each nostril with a cotton swab two times daily, morning and evening for 5 days before surgery 15 g 0 10/3/2017 at 1900       PMH:   Past Medical History:   Diagnosis Date   • Bradycardia     At rest    • Cancer     Prostate cancer    • Dizziness    • Hypertension    • Rectal hemorrhage      PSH:    Past Surgical History:   Procedure Laterality Date   • COLONOSCOPY     • PROSTATE SURGERY  2016   • TURP / TRANSURETHRAL INCISION / DRAINAGE PROSTATE         Immunization History:  Influenza: no  Pneumococcal: no  Tetanus: unknown    Social History:   Tobacco:   History   Smoking Status   • Former Smoker   • Types: Cigarettes   • Quit date: 9/14/1997   Smokeless Tobacco   • Never Used      Alcohol:     History   Alcohol Use   • Yes     Comment:  "Social       Vitals:           BP (!) 185/97 (BP Location: Right arm, Patient Position: Lying)  Pulse 63  Temp 97.6 °F (36.4 °C) (Temporal Artery )   Resp 18  Ht 67\" (170.2 cm)  Wt 186 lb (84.4 kg)  SpO2 96%  BMI 29.13 kg/m2    Physical Exam:  General Appearance:    Alert, cooperative, no distress, appears stated age   Head:    Normocephalic, without obvious abnormality, atraumatic   Lungs:     Clear to auscultation bilaterally, respirations unlabored    Heart:   Regular rate and rhythm, S1 and S2 normal, no murmur, rub    or gallop    Abdomen:    Soft, non-tender.  +bowel sounds   Breast Exam:    deferred   Genitalia:    deferred   Extremities:   Extremities normal, atraumatic, no cyanosis or edema   Skin:   Skin color, texture, turgor normal, no rashes or lesions   Neurologic:   Grossly intact   Results Review  I reviewed the patient's new clinical results. CBC, Chem profile, EKG and INR on chart.    Cancer Staging (if applicable)  Cancer Patient: __ yes _x_no __unknown; If yes, clinical stage T:__ N:__M:__, stage group or __N/A    Impression: Lumbar spinal stenosis    Plan:  For lumbar laminectomy today.    RICCO Gupta   10/4/2017   7:11 AM  "

## 2017-10-05 ENCOUNTER — TELEPHONE (OUTPATIENT)
Dept: NEUROSURGERY | Facility: CLINIC | Age: 62
End: 2017-10-05

## 2017-10-05 VITALS
TEMPERATURE: 97.6 F | WEIGHT: 186 LBS | OXYGEN SATURATION: 98 % | HEIGHT: 67 IN | HEART RATE: 55 BPM | RESPIRATION RATE: 16 BRPM | BODY MASS INDEX: 29.19 KG/M2 | DIASTOLIC BLOOD PRESSURE: 74 MMHG | SYSTOLIC BLOOD PRESSURE: 156 MMHG

## 2017-10-05 PROBLEM — M48.062 SPINAL STENOSIS, LUMBAR REGION, WITH NEUROGENIC CLAUDICATION: Status: ACTIVE | Noted: 2017-09-18

## 2017-10-05 PROCEDURE — G8979 MOBILITY GOAL STATUS: HCPCS

## 2017-10-05 PROCEDURE — G0378 HOSPITAL OBSERVATION PER HR: HCPCS

## 2017-10-05 PROCEDURE — 97110 THERAPEUTIC EXERCISES: CPT

## 2017-10-05 PROCEDURE — 97161 PT EVAL LOW COMPLEX 20 MIN: CPT

## 2017-10-05 PROCEDURE — G8980 MOBILITY D/C STATUS: HCPCS

## 2017-10-05 PROCEDURE — 99024 POSTOP FOLLOW-UP VISIT: CPT | Performed by: NEUROLOGICAL SURGERY

## 2017-10-05 PROCEDURE — 25010000003 CEFAZOLIN IN DEXTROSE 2-4 GM/100ML-% SOLUTION: Performed by: NEUROLOGICAL SURGERY

## 2017-10-05 PROCEDURE — G8978 MOBILITY CURRENT STATUS: HCPCS

## 2017-10-05 RX ORDER — METHOCARBAMOL 750 MG/1
750 TABLET, FILM COATED ORAL 3 TIMES DAILY
Qty: 60 TABLET | Refills: 1 | Status: SHIPPED | OUTPATIENT
Start: 2017-10-05 | End: 2017-11-30

## 2017-10-05 RX ORDER — DOCUSATE SODIUM 250 MG
250 CAPSULE ORAL 2 TIMES DAILY PRN
Qty: 30 CAPSULE | Refills: 0 | Status: SHIPPED | OUTPATIENT
Start: 2017-10-05 | End: 2017-11-30

## 2017-10-05 RX ORDER — HYDROCODONE BITARTRATE AND ACETAMINOPHEN 5; 325 MG/1; MG/1
1 TABLET ORAL EVERY 8 HOURS PRN
Qty: 30 TABLET | Refills: 0
Start: 2017-10-05 | End: 2017-10-16

## 2017-10-05 RX ADMIN — IBUPROFEN 400 MG: 400 TABLET ORAL at 08:10

## 2017-10-05 RX ADMIN — CEFAZOLIN SODIUM 2 G: 2 INJECTION, SOLUTION INTRAVENOUS at 00:28

## 2017-10-05 NOTE — PLAN OF CARE
Problem: Patient Care Overview (Adult)  Goal: Plan of Care Review  Outcome: Ongoing (interventions implemented as appropriate)    10/05/17 1011   Coping/Psychosocial Response Interventions   Plan Of Care Reviewed With patient   Outcome Evaluation   Outcome Summary/Follow up Plan Pt ambulated 450 feet with SBA, performed stairs and demo good participation with ther ex. Pt is discharging home today with family, had no further questions/concerns about discharge         Problem: Laminectomy/Foraminotomy/Discectomy (Adult)  Goal: Signs and Symptoms of Listed Potential Problems Will be Absent or Manageable (Laminectomy/Foraminotomy/Discectomy)  Outcome: Ongoing (interventions implemented as appropriate)    10/05/17 1011   Laminectomy/Foraminotomy/Discectomy   Problems Assessed (Laminectomy/Laminotomy/Discectomy) pain;functional decline/self-care deficit   Problems Present (Laminectomy/Laminotomy/Discectomy) pain;functional decline/self-care deficit         Problem: Inpatient Physical Therapy  Goal: Transfer Training Goal 1 LTG- PT  Outcome: Outcome(s) achieved Date Met:  10/05/17    10/05/17 1011   Transfer Training PT LTG   Transfer Training PT LTG, Date Established 10/05/17   Transfer Training PT LTG, Time to Achieve 5 days   Transfer Training PT LTG, Activity Type sit to stand/stand to sit   Transfer Training PT LTG, Ralls Level supervision required   Transfer Training PT LTG, Date Goal Reviewed 10/05/17   Transfer Training PT LTG, Outcome goal met       Goal: Gait Training Goal LTG- PT  Outcome: Outcome(s) achieved Date Met:  10/05/17    10/05/17 1011   Gait Training PT LTG   Gait Training Goal PT LTG, Date Established 10/05/17   Gait Training Goal PT LTG, Time to Achieve 5 days   Gait Training Goal PT LTG, Ralls Level supervision required   Gait Training Goal PT LTG, Assist Device (none)   Gait Training Goal PT LTG, Distance to Achieve 450 feet   Gait Training Goal PT LTG, Date Goal Reviewed 10/05/17    Gait Training Goal PT LTG, Outcome goal met

## 2017-10-05 NOTE — PROGRESS NOTES
HOD# : 1    No events last night  No headache  Principal Problem:    Spinal stenosis, lumbar region, with neurogenic claudication  Active Problems:    Lumbar stenosis with neurogenic claudication      Temp:  [97.1 °F (36.2 °C)-98.8 °F (37.1 °C)] 97.6 °F (36.4 °C)  Heart Rate:  [55-91] 55  Resp:  [16-18] 16  BP: (134-161)/(63-85) 156/74  I/O last 3 completed shifts:  In: 1200 [I.V.:1200]  Out: 2215 [Urine:2215]  I/O this shift:  In: -   Out: 325 [Urine:325]  Vital signs were reviewed and documented in the chart      EXAM   Patient appeared in good neurologic function with normal comprehension   CN grossly intact  Moves all extremities to command  incsion dry scant blood      PLAN   Ambulate and home later if no headache etc d/w family

## 2017-10-05 NOTE — PROGRESS NOTES
Discharge Planning Assessment  [facility]     Patient Name: Russell Soto  MRN: [id number]  Today's Date: 10/5/2017    Admit Date: 10/4/2017          Discharge Needs Assessment       10/05/17 1410    Living Environment    Lives With spouse   Pt [place] [facility]    Living Arrangements house    Type of Financial/Environmental Concern none    Transportation Available car;family or friend will provide    Living Environment    Provides Primary Care For no one    Quality Of Family Relationships supportive;helpful;involved    Able to Return to Prior Living Arrangements yes    Discharge Needs Assessment    Concerns To Be Addressed denies needs/concerns at this time;no discharge needs identified    Readmission Within The Last 30 Days no previous admission in last 30 days    Equipment Currently Used at Home cane, quad;crutches;rollator    Equipment Needed After Discharge none    Discharge Contact Information if Applicable [phone]            Discharge Plan       10/05/17 1411    Case Management/Social Work Plan    Plan home    Patient/Family In Agreement With Plan yes    Additional Comments CM spoke with pt and wife prior to discharge. Plan is for pt to return home with assistance from his wife as needed. Pt denies discharge needs.        Discharge Placement     No information found        Expected Discharge Date and Time     Expected Discharge Date Expected Discharge Time    Oct 5, 2017               Demographic Summary       10/05/17 1406    Referral Information    Referral Source admission list    Contact Information    Permission Granted to Share Information With     Primary Care Physician Information    Name RICCO Newton            Functional Status       10/05/17 1406    Functional Status Current    Current Functional Level Comment see pprevious charting    Functional Status Prior    Ambulation 1-->assistive equipment   uses quad cane as needed    Transferring  0-->independent    Toileting 0-->independent    Bathing 0-->independent    Dressing 0-->independent    Eating 0-->independent    Communication 0-->understands/communicates without difficulty    Swallowing 0-->swallows foods/liquids without difficulty    IADL    Medications independent   pt confirms he has prescription drug coverage and denies issues obtaining or affording current medications    Meal Preparation independent    Housekeeping independent    Laundry independent    Shopping independent    Oral Care independent    Activity Tolerance    Current Activity Limitations other (see comments)   per md orders    Usual Activity Tolerance moderate    Current Activity Tolerance fair    Employment/Financial    Financial Concerns none   pt confirms he has [employer] denies concerns or disruption in coverage            Psychosocial     None            Abuse/Neglect     None            Legal     None            Substance Abuse     None            Patient Forms     None          Raya Boyce

## 2017-10-05 NOTE — TELEPHONE ENCOUNTER
"Provider:  Karthik  Caller: Russell Soto  Time of call:   01:12 PM  Phone #:  [phone]  Surgery:  LUMBAR LAMINECTOMY  L2, L3, L4  Surgery Date:  10/04/17  Last visit:   09/14/17  Next visit: 10/16/17    Reason for call:       Pt was just discharged home today, was told he was going to set up with an appt to be measured for a back brace, but he doesn't think he needs it.  Said that \"it doesn't sound like something he would use unless he absolutely has to use it\"   "

## 2017-10-05 NOTE — TELEPHONE ENCOUNTER
Notified pt's wife that, (per El) it is fine if he doesn't want to use the brace. She understood that he is to take it easy until his f/u with us and to call us back if things change.

## 2017-10-05 NOTE — THERAPY DISCHARGE NOTE
Acute Care - Physical Therapy Initial Eval/Discharge  [facility]     Patient Name: Russell Soto  : 1955  MRN: [id number]  Today's Date: 10/5/2017   Onset of Illness/Injury or Date of Surgery Date: 10/04/17  Date of Referral to PT: 10/05/17  Referring Physician: MD Kartihk      Admit Date: 10/4/2017    Visit Dx:    ICD-10-CM ICD-9-CM   1. Impaired functional mobility, balance, gait, and endurance Z74.09 V49.89   2. Spinal stenosis, lumbar region, with neurogenic claudication M48.06 724.03     Patient Active Problem List   Diagnosis   • Sinus bradycardia   • BPH (benign prostatic hypertrophy)   • Anemia   • Palpitations   • Health care maintenance   • Hyperlipidemia   • Acute bilateral low back pain with bilateral sciatica   • Hypertension   • Sepsis   • Spinal stenosis, lumbar region, with neurogenic claudication   • Lumbar stenosis with neurogenic claudication     Past Medical History:   Diagnosis Date   • Bradycardia     At rest    • Cancer     Prostate cancer    • Dizziness    • Hypertension    • Rectal hemorrhage      Past Surgical History:   Procedure Laterality Date   • COLONOSCOPY     • PROSTATE SURGERY     • TURP / TRANSURETHRAL INCISION / DRAINAGE PROSTATE            PT ASSESSMENT (last 72 hours)      PT Evaluation       10/05/17 0933 10/04/17 2000    Rehab Evaluation    Document Type evaluation;discharge summary  -MJ     Subjective Information agree to therapy;complains of;pain  -MJ     Patient Effort, Rehab Treatment excellent  -MJ     Symptoms Noted During/After Treatment none  -MJ     General Information    Patient Profile Review yes  -MJ     Onset of Illness/Injury or Date of Surgery Date 10/04/17  -     Referring Physician MD Karthik  -MJ     General Observations Pt supine in bed, IV, SCDs. Wife present  -     Pertinent History Of Current Problem Pt presents for surgical management of back pain and dysfunction with symptoms into both legs that failed to improve with conservative  management  -MJ     Precautions/Limitations spinal precautions  -MJ     Prior Level of Function min assist:;all household mobility;community mobility;gait;independent:;transfer;bed mobility   Pain with walking, cane for mobility  -MJ     Equipment Currently Used at Home cane, quad;walker, rolling  -MJ     Plans/Goals Discussed With patient and family;agreed upon  -MJ     Risks Reviewed patient and family:;LOB;increased discomfort  -MJ     Benefits Reviewed patient and family:;improve function;increase independence;increase strength;increase balance;decrease pain  -MJ     Barriers to Rehab none identified  -MJ     Living Environment    Lives With spouse  -MJ     Living Arrangements house  -MJ     Home Accessibility stairs to enter home;stairs within home;tub/shower is not walk in  -MJ     Number of Stairs to Enter Home 2  -MJ     Number of Stairs Within Home 15  -MJ     Stair Railings at Home inside, present on right side  -MJ     Clinical Impression    Date of Referral to PT 10/05/17  -MJ     PT Diagnosis s/p L2, L3, L4 laminectomy  -MJ     Criteria for Skilled Therapeutic Interventions Met yes;treatment indicated  -MJ     Pain Assessment    Pain Assessment 0-10  -MJ     Pain Score 2  -MJ     Post Pain Score 1  -MJ     Pain Type Acute pain  -MJ     Pain Location Back  -MJ     Pain Intervention(s) Repositioned;Ambulation/increased activity  -MJ     Cognitive Assessment/Intervention    Current Cognitive/Communication Assessment functional  -MJ     Orientation Status oriented x 4  -MJ     Follows Commands/Answers Questions 100% of the time;able to follow multi-step instructions  -MJ     Personal Safety WNL/WFL  -MJ     ROM (Range of Motion)    General ROM no range of motion deficits identified  -MJ     MMT (Manual Muscle Testing)    General MMT Assessment lower extremity strength deficits identified  -MJ     Lower Extremity    Lower Ext Manual Muscle Testing Detail BL LE grossly 4+/5  -MJ     Muscle Tone Assessment     Bilateral Upper Extremities Muscle Tone Assessment  associated movements noted  -KAREN    Bilateral Lower Extremities Muscle Tone Assessment  associated movements noted  -KAREN    Bed Mobility, Assessment/Treatment    Bed Mobility, Assistive Device head of bed elevated  -MJ     Bed Mob, Supine to Sit, [place] supervision required;verbal cues required  -MJ     Bed Mob, Sit to Supine, [place] not tested   Pt in bathroom at end of session  -MJ     Bed Mobility, Impairments strength decreased;pain   back precautions  -MJ     Bed Mobility, Comment Pt performed log roll out of bed. Verbal cues for sequencing  -MJ     Transfer Assessment/Treatment    Transfers, Sit-Stand [place] stand by assist;verbal cues required  -MJ     Transfers, Stand-Sit [place] stand by assist;verbal cues required  -MJ     Transfers, Sit-Stand-Sit, Assist Device --   none  -MJ     Transfer, Safety Issues step length decreased;weight-shifting ability decreased  -MJ     Transfer, Impairments strength decreased;pain  -MJ     Transfer, Comment Verbal cues for correct hand placement  -MJ     Gait Assessment/Treatment    Gait, [place] Level contact guard assist;verbal cues required   progressed to SBA  -MJ     Gait, Assistive Device --   none  -MJ     Gait, Distance (Feet) 450  -MJ     Gait, Gait Pattern Analysis swing-through gait  -MJ     Gait, Gait Deviations juan antonio decreased;step length decreased  -MJ     Gait, Safety Issues step length decreased  -MJ     Gait, Impairments strength decreased;pain  -MJ     Gait, Comment Pt demo step through gait pattern at slow pace. Initially required CGA, progressed to SBA. Cues for upright posture. Pt reports pain improved with gait  -MJ     Stairs Assessment/Treatment    Number of Stairs 5  -MJ     Stairs, Handrail Location right side (ascending)  -     Stairs, [place] Level contact guard assist;verbal cues required  -MJ     Stairs, Technique Used step over step (ascending);step over  step (descending)  -     Stairs, Safety Issues sequencing ability decreased;weight-shifting ability decreased  -MJ     Stairs, Impairments strength decreased;pain  -     Stairs, Comment Pt performed reciprocally. Verbal cues to ascend with stronger leg and descend with weaker leg if taking one at a time  -     Therapy Exercises    Bilateral Lower Extremities AROM:;10 reps;supine;ankle pumps/circles;glut sets;quad sets;hip IR;hip ER   abdominal sets  -     Sensory Assessment/Intervention    Light Touch --   denies numbness/tingling; can actively DF both ankles  -     Positioning and Restraints    Pre-Treatment Position in bed  -     Post Treatment Position bathroom  -     Bathroom notified nsg;call light within reach;encouraged to call for assist;with family/caregiver  -       10/04/17 1813 10/04/17 1600    Muscle Tone Assessment    Bilateral Upper Extremities Muscle Tone Assessment associated movements noted  -SJ associated movements noted  -SJ    Bilateral Lower Extremities Muscle Tone Assessment associated movements noted  -SJ associated movements noted  -SJ      10/04/17 1330 10/04/17 0711    General Information    Equipment Currently Used at Home  cane, quad  -TS    Living Environment    Lives With  spouse  -TS    Living Arrangements  house  -TS    Home Accessibility  stairs to enter home;stairs within home  -TS    Number of Stairs to Enter Home  2  -TS    Number of Stairs Within Home  1  -TS    Stair Railings at Home  inside, present on right side  -TS    Type of Financial/Environmental Concern  none  -TS    Transportation Available  car;family or friend will provide  -TS    Muscle Tone Assessment    Bilateral Upper Extremities Muscle Tone Assessment associated movements noted  -SJ     Bilateral Lower Extremities Muscle Tone Assessment associated movements noted  -SJ       User Key  (r) = Recorded By, (t) = Taken By, (c) = Cosigned By    Initials Name Provider Type    TS Dalia Allen RN  Registered Nurse    JEAN-PIERRE Zuluaga, PT Physical Therapist    KAREN Jonas, RN Registered Nurse    ETHEL Da Silva RN Registered Nurse          Physical Therapy Education     Title: PT OT SLP Therapies (Done)     Topic: Physical Therapy (Done)     Point: Mobility training (Done)    Learning Progress Summary    Learner Readiness Method Response Comment Documented by Status   Patient Acceptance E,D,H VU Reviewed back precautions, log roll, [facility] 10/05/17 1010 Done   Significant Other Acceptance E,D,H VU Reviewed back precautions, log roll, [facility] 10/05/17 1010 Done               Point: Home exercise program (Done)    Learning Progress Summary    Learner Readiness Method Response Comment Documented by Status   Patient Acceptance E,D,H VU Reviewed back precautions, log roll, [facility] 10/05/17 1010 Done   Significant Other Acceptance E,D,H VU Reviewed back precautions, log roll, [facility] 10/05/17 1010 Done               Point: Body mechanics (Done)    Learning Progress Summary    Learner Readiness Method Response Comment Documented by Status   Patient Acceptance E,D,H VU Reviewed back precautions, log roll, [facility] 10/05/17 1010 Done   Significant Other Acceptance E,D,H VU Reviewed back precautions, log roll, [facility] 10/05/17 1010 Done               Point: Precautions (Done)    Learning Progress Summary    Learner Readiness Method Response Comment Documented by Status   Patient Acceptance E,D,H VU Reviewed back precautions, log roll, [facility] 10/05/17 1010 Done   Significant Other Acceptance E,D,H VU Reviewed back precautions, log roll, [facility] 10/05/17 1010 Done                      User Key     Initials Effective Dates Name Provider Type Discipline     03/14/16 -  Alejo Zuluaga, PT Physical Therapist PT                PT Recommendation and Plan  Anticipated Discharge Disposition: home with assist  Planned Therapy Interventions: balance training, bed mobility training, gait training, home exercise program,  patient/family education, stair training, strengthening, transfer training  PT Frequency: evaluation only  Plan of Care Review  Plan Of Care Reviewed With: patient  Outcome Summary/Follow up Plan: Pt ambulated 450 feet with SBA, performed stairs and demo good participation with ther ex. Pt is discharging home today with family, had no further questions/concerns about discharge          IP PT Goals       10/05/17 1011          Transfer Training PT LTG    Transfer Training PT LTG, Date Established 10/05/17  -MJ      Transfer Training PT LTG, Time to Achieve 5 days  -MJ      Transfer Training PT LTG, Activity Type sit to stand/stand to sit  -MJ      Transfer Training PT LTG, [place] Level supervision required  -MJ      Transfer Training PT  LTG, Date Goal Reviewed 10/05/17  -MJ      Transfer Training PT LTG, Outcome goal met  -MJ      Gait Training PT LTG    Gait Training Goal PT LTG, Date Established 10/05/17  -MJ      Gait Training Goal PT LTG, Time to Achieve 5 days  -MJ      Gait Training Goal PT LTG, [place] Level supervision required  -MJ      Gait Training Goal PT LTG, Assist Device --   none  -MJ      Gait Training Goal PT LTG, Distance to Achieve 450 feet  -MJ      Gait Training Goal PT LTG, Date Goal Reviewed 10/05/17  -MJ      Gait Training Goal PT LTG, Outcome goal met  -MJ        User Key  (r) = Recorded By, (t) = Taken By, (c) = Cosigned By    Initials Name Provider Type    JEAN-PIERRE Zuluaga, PT Physical Therapist                Outcome Measures       10/05/17 0974          How much help from another person do you currently need...    Turning from your back to your side while in flat bed without using bedrails? 4  -MJ      Moving from lying on back to sitting on the side of a flat bed without bedrails? 3  -MJ      Moving to and from a bed to a chair (including a wheelchair)? 3  -MJ      Standing up from a chair using your arms (e.g., wheelchair, bedside chair)? 3  -MJ      Climbing 3-5 steps with a  railing? 3  -MJ      To walk in hospital room? 3  -MJ      AM-PAC 6 Clicks Score 19  -MJ      Functional Assessment    Outcome Measure Options AM-PAC 6 Clicks Basic Mobility (PT)  -        User Key  (r) = Recorded By, (t) = Taken By, (c) = Cosigned By    Initials Name Provider Type    JEAN-PIERRE Zuluaga PT Physical Therapist           Time Calculation:         PT Charges       10/05/17 1014          Time Calculation    Start Time 0933  -MJ      PT Received On 10/05/17  -MJ      PT Goal Re-Cert Due Date 10/15/17  -      Time Calculation- PT    Total Timed Code Minutes- PT 10 minute(s)  -        User Key  (r) = Recorded By, (t) = Taken By, (c) = Cosigned By    Initials Name Provider Type    JEAN-PIERRE Zuluaga PT Physical Therapist          Therapy Charges for Today     Code Description Service Date Service Provider Modifiers Qty    25287179994 HC PT MOBILITY CURRENT 10/5/2017 Alejo Zuluaga, PT GP, CJ 1    32670696704 HC PT MOBILITY PROJECTED 10/5/2017 Alejo Zuluaga, PT GP, CJ 1    25912428957 HC PT MOBILITY DISCHARGE 10/5/2017 Alejo Zuluaga, PT GP, CJ 1    64070677849 HC PT EVAL LOW COMPLEXITY 3 10/5/2017 Alejo Zuluaga PT GP 1    85089622698 HC PT THER PROC EA 15 MIN 10/5/2017 Alejo Zuluaga PT GP 1          PT G-Codes  PT Professional Judgement Used?: Yes  Outcome Measure Options: AM-PAC 6 Clicks Basic Mobility (PT)  Score: 19  Functional Limitation: Mobility: Walking and moving around  Mobility: Walking and Moving Around Current Status (): At least 20 percent but less than 40 percent impaired, limited or restricted  Mobility: Walking and Moving Around Goal Status (): At least 20 percent but less than 40 percent impaired, limited or restricted  Mobility: Walking and Moving Around Discharge Status (): At least 20 percent but less than 40 percent impaired, limited or restricted    PT Discharge Summary  Anticipated Discharge Disposition: home with assist  Reason for Discharge: Discharge from facility  Outcomes  Achieved: Discharge from facility occurred on same date as evluation  Discharge Destination: Home with assist    Alejo Zuluaga, PT  10/5/2017

## 2017-10-05 NOTE — PLAN OF CARE
Problem: Patient Care Overview (Adult)  Goal: Plan of Care Review  Outcome: Ongoing (interventions implemented as appropriate)    Problem: Laminectomy/Foraminotomy/Discectomy (Adult)  Goal: Signs and Symptoms of Listed Potential Problems Will be Absent or Manageable (Laminectomy/Foraminotomy/Discectomy)  Outcome: Ongoing (interventions implemented as appropriate)    10/05/17 0217   Laminectomy/Foraminotomy/Discectomy   Problems Assessed (Laminectomy/Laminotomy/Discectomy) all   Problems Present (Laminectomy/Laminotomy/Discectomy) pain

## 2017-10-13 ENCOUNTER — OFFICE VISIT (OUTPATIENT)
Dept: INTERNAL MEDICINE | Facility: CLINIC | Age: 62
End: 2017-10-13

## 2017-10-13 VITALS
SYSTOLIC BLOOD PRESSURE: 120 MMHG | HEART RATE: 53 BPM | OXYGEN SATURATION: 99 % | DIASTOLIC BLOOD PRESSURE: 62 MMHG | WEIGHT: 184 LBS | BODY MASS INDEX: 28.82 KG/M2

## 2017-10-13 DIAGNOSIS — I15.8 OTHER SECONDARY HYPERTENSION: Primary | ICD-10-CM

## 2017-10-13 PROCEDURE — 99213 OFFICE O/P EST LOW 20 MIN: CPT | Performed by: PHYSICIAN ASSISTANT

## 2017-10-13 NOTE — ASSESSMENT & PLAN NOTE
Hypertension is improving with treatment.  Continue current treatment regimen.  Blood pressure will be reassessed in 3 months.

## 2017-10-13 NOTE — PROGRESS NOTES
Chief Complaint   Patient presents with   • Follow-up     blood pressure       Subjective   Russell Soto is a 62 y.o. male.       History of Present Illness     Pt is s/p successful laminectomy. He was extremely pleased with his surgeon and anesthesiologist and care he received. The pain he had prior to surgery has resolved, has some new discomfort related to the surgery that he is hopeful will improve. F/u with NS next week. He is moving very little as recommended.    BP has been well controlled with low dose of Lisinopril. No near syncope or dizziness. He is taking as directed.      Current Outpatient Prescriptions:   •  docusate sodium (COLACE) 250 MG capsule, Take 1 capsule by mouth 2 (Two) Times a Day As Needed for Constipation., Disp: 30 capsule, Rfl: 0  •  HYDROcodone-acetaminophen (NORCO) 5-325 MG per tablet, Take 1 tablet by mouth Every 8 (Eight) Hours As Needed for Moderate Pain ., Disp: 30 tablet, Rfl: 0  •  ibuprofen (ADVIL,MOTRIN) 400 MG tablet, Take 400 mg by mouth 3 (Three) Times a Day., Disp: , Rfl:   •  lisinopril (PRINIVIL,ZESTRIL) 5 MG tablet, Take 1 tablet by mouth Daily., Disp: 30 tablet, Rfl: 2  •  methocarbamol (ROBAXIN) 750 MG tablet, Take 1 tablet by mouth 3 (Three) Times a Day., Disp: 60 tablet, Rfl: 1     PMFSH  The following portions of the patient's history were reviewed and updated as appropriate: allergies, current medications, past family history, past medical history, past social history, past surgical history and problem list.    Review of Systems   Constitutional: Negative for appetite change, fever and unexpected weight change.   HENT: Negative.    Eyes: Negative for pain and visual disturbance.   Respiratory: Negative for chest tightness, shortness of breath and wheezing.    Cardiovascular: Negative for chest pain and palpitations.   Gastrointestinal: Negative for abdominal pain, blood in stool, diarrhea, nausea and vomiting.   Endocrine: Negative.    Genitourinary: Negative  for difficulty urinating, flank pain, frequency and urgency.   Musculoskeletal: Negative for back pain, joint swelling and myalgias.   Skin: Negative for color change and rash.   Neurological: Negative for tremors and weakness.   Hematological: Negative for adenopathy.   Psychiatric/Behavioral: Negative for confusion and decreased concentration.   All other systems reviewed and are negative.      Objective   /62  Pulse 53  Wt 184 lb (83.5 kg)  SpO2 99%  BMI 28.82 kg/m2    Physical Exam   Constitutional: He is oriented to person, place, and time. He appears well-developed and well-nourished.   HENT:   Head: Normocephalic and atraumatic.   Right Ear: External ear normal.   Left Ear: External ear normal.   Eyes: Conjunctivae are normal.   Neck: Normal range of motion.   Cardiovascular: Normal rate and regular rhythm.    Pulmonary/Chest: Effort normal and breath sounds normal.   Neurological: He is alert and oriented to person, place, and time.   Skin: Skin is warm and dry.   Psychiatric: He has a normal mood and affect. His behavior is normal. Judgment and thought content normal.       Results for orders placed or performed in visit on 10/01/17   MRSA Screen Culture - Swab, Nares   Result Value Ref Range    MRSA SCREEN CX       No Methicillin Resistant Staphylococcus aureus isolated   Basic metabolic panel   Result Value Ref Range    Glucose 123 (H) 70 - 100 mg/dL    BUN 11 9 - 23 mg/dL    Creatinine 0.70 0.60 - 1.30 mg/dL    Sodium 138 132 - 146 mmol/L    Potassium 4.1 3.5 - 5.5 mmol/L    Chloride 104 99 - 109 mmol/L    CO2 29.0 20.0 - 31.0 mmol/L    Calcium 9.4 8.7 - 10.4 mg/dL    eGFR Non African Amer 114 >60 mL/min/1.73    BUN/Creatinine Ratio 15.7 7.0 - 25.0    Anion Gap 5.0 3.0 - 11.0 mmol/L   Protime-INR   Result Value Ref Range    Protime 10.2 9.6 - 11.5 Seconds    INR 0.94    CBC Auto Differential   Result Value Ref Range    WBC 5.95 3.50 - 10.80 10*3/mm3    RBC 4.10 (L) 4.20 - 5.76 10*6/mm3     Hemoglobin 13.5 13.1 - 17.5 g/dL    Hematocrit 37.9 (L) 38.9 - 50.9 %    MCV 92.4 80.0 - 99.0 fL    MCH 32.9 (H) 27.0 - 31.0 pg    MCHC 35.6 32.0 - 36.0 g/dL    RDW 12.2 11.3 - 14.5 %    RDW-SD 41.3 37.0 - 54.0 fl    MPV 9.4 6.0 - 12.0 fL    Platelets 227 150 - 450 10*3/mm3    Neutrophil % 63.6 41.0 - 71.0 %    Lymphocyte % 22.9 (L) 24.0 - 44.0 %    Monocyte % 9.4 0.0 - 12.0 %    Eosinophil % 2.9 0.0 - 3.0 %    Basophil % 1.0 0.0 - 1.0 %    Immature Grans % 0.2 0.0 - 0.6 %    Neutrophils, Absolute 3.79 1.50 - 8.30 10*3/mm3    Lymphocytes, Absolute 1.36 0.60 - 4.80 10*3/mm3    Monocytes, Absolute 0.56 0.00 - 1.00 10*3/mm3    Eosinophils, Absolute 0.17 0.00 - 0.30 10*3/mm3    Basophils, Absolute 0.06 0.00 - 0.20 10*3/mm3    Immature Grans, Absolute 0.01 0.00 - 0.03 10*3/mm3        ASSESSMENT/PLAN    Problem List Items Addressed This Visit        Cardiovascular and Mediastinum    Hypertension - Primary     Hypertension is improving with treatment.  Continue current treatment regimen.  Blood pressure will be reassessed in 3 months.                    Return in about 3 months (around 1/13/2018) for Recheck.

## 2017-10-16 ENCOUNTER — OFFICE VISIT (OUTPATIENT)
Dept: NEUROSURGERY | Facility: CLINIC | Age: 62
End: 2017-10-16

## 2017-10-16 VITALS
TEMPERATURE: 99.1 F | SYSTOLIC BLOOD PRESSURE: 124 MMHG | DIASTOLIC BLOOD PRESSURE: 70 MMHG | WEIGHT: 186 LBS | BODY MASS INDEX: 29.19 KG/M2 | HEIGHT: 67 IN

## 2017-10-16 DIAGNOSIS — M54.41 ACUTE BILATERAL LOW BACK PAIN WITH BILATERAL SCIATICA: Primary | ICD-10-CM

## 2017-10-16 DIAGNOSIS — M43.16 SPONDYLOLISTHESIS OF LUMBAR REGION: ICD-10-CM

## 2017-10-16 DIAGNOSIS — M48.061 FORAMINAL STENOSIS OF LUMBAR REGION: ICD-10-CM

## 2017-10-16 DIAGNOSIS — M54.42 ACUTE BILATERAL LOW BACK PAIN WITH BILATERAL SCIATICA: Primary | ICD-10-CM

## 2017-10-16 PROCEDURE — 99024 POSTOP FOLLOW-UP VISIT: CPT | Performed by: PHYSICIAN ASSISTANT

## 2017-10-16 NOTE — PROGRESS NOTES
"Patient: Russell Soto  : 1955  Chart #: [id number]    Date of Service: 10/16/2017    History of Present Illness Mr. Soto is a very kind 62-year-old retired gentleman with complaints of low back and bilateral leg pain with associated neurogenic claudication.  His studies revealed stenosis at L3-4, and L4-5 felt to be the source of his symptoms.  As such on 10/4/2017 he underwent surgical decompression of these levels.  The operation was complicated by an incidental durotomy which was closed primarily.    Today Mr. Soto is 10 days postop.  He has some soreness in the low back but otherwise no complaints.  He has been cautious with his activities, not \"testing the hernandez\".  No pain with walking short distances but has not pushed himself to go further yet.  He is taking ibuprofen and Robaxin for pain control.  He denies incisional issues. No headache.  He is overall very pleased with the outcome of the surgery.     The following portions of the patient's history were reviewed and updated as appropriate: allergies, current medications, past family history, past medical history, past social history, past surgical history and problem list.    Review of Systems   Constitutional: Negative for activity change, appetite change, chills, diaphoresis, fatigue, fever and unexpected weight change.   HENT: Negative for congestion, dental problem, drooling, ear discharge, ear pain, facial swelling, hearing loss, mouth sores, nosebleeds, postnasal drip, rhinorrhea, sinus pressure, sneezing, sore throat, tinnitus, trouble swallowing and voice change.    Eyes: Negative for photophobia, pain, discharge, redness, itching and visual disturbance.   Respiratory: Negative for apnea, cough, choking, chest tightness, shortness of breath, wheezing and stridor.    Cardiovascular: Negative for chest pain, palpitations and leg swelling.   Gastrointestinal: Negative for abdominal distention, abdominal pain, anal bleeding, blood in " "stool, constipation, diarrhea, nausea, rectal pain and vomiting.   Endocrine: Negative for cold intolerance, heat intolerance, polydipsia, polyphagia and polyuria.   Genitourinary: Negative for decreased urine volume, difficulty urinating, dysuria, enuresis, flank pain, frequency, genital sores, hematuria and urgency.   Musculoskeletal: Positive for back pain and gait problem. Negative for arthralgias, joint swelling, myalgias, neck pain and neck stiffness.   Skin: Negative for color change, pallor, rash and wound.   Allergic/Immunologic: Negative for environmental allergies, food allergies and immunocompromised state.   Neurological: Negative for dizziness, tremors, seizures, syncope, facial asymmetry, speech difficulty, weakness, light-headedness, numbness and headaches.   Hematological: Negative for adenopathy. Does not bruise/bleed easily.   Psychiatric/Behavioral: Negative for agitation, behavioral problems, confusion, decreased concentration, dysphoric mood, hallucinations, self-injury, sleep disturbance and suicidal ideas. The patient is not nervous/anxious and is not hyperactive.        Objective   Vital Signs: Blood pressure 124/70, temperature 99.1 °F (37.3 °C), height 67\" (170.2 cm), weight 186 lb (84.4 kg).  Physical Exam   Constitutional: He appears well-developed and well-nourished. No distress.   HENT:   Head: Normocephalic and atraumatic.   Eyes: EOM are normal. Pupils are equal, round, and reactive to light.   Skin:   Nylon sutures are removed in a clean fashion and incision remained intact without warmth erythema or induration.   Psychiatric: He has a normal mood and affect. His behavior is normal. Thought content normal.   Nursing note and vitals reviewed.    Assessment/Plan   Medical Decision Making: Mr. Soto is 10 days status post spinal decompression L3-5 and is doing excellent.  His sutures were removed today and further wound care instructions were discussed.  I have encouraged him to " slowly advance his activities.  I would like him to do some more walking.  We discussed pursuing formal physical therapy in another couple weeks.  He prefers to do the exercises at home.  He will follow up with Dr. Angeles in 6 weeks; however, he may call our office any time if new issues or questions arise.               Lexie Michelle PA-C  Patient Care Team:  RICCO Spence as PCP - General (Internal Medicine)  RICCO Spence as Referring Physician (Internal Medicine)

## 2017-11-02 ENCOUNTER — DOCUMENTATION (OUTPATIENT)
Dept: PHYSICAL THERAPY | Facility: HOSPITAL | Age: 62
End: 2017-11-02

## 2017-11-02 DIAGNOSIS — M54.42 ACUTE LEFT-SIDED LOW BACK PAIN WITH LEFT-SIDED SCIATICA: Primary | ICD-10-CM

## 2017-11-02 NOTE — THERAPY DISCHARGE NOTE
Outpatient Physical Therapy Discharge Summary         Patient Name: Russell Soto  : 1955  MRN: [id number]    Today's Date: 2017    Visit Dx:    ICD-10-CM ICD-9-CM   1. Acute left-sided low back pain with left-sided sciatica M54.42 724.2     724.3             PT OP Goals       17 1600       PT Short Term Goals    STG Date to Achieve 10/03/17  -RB     STG 1 Pt to be compliant w/ initial HEP for strengthening and flexibility.  -RB     STG 1 Progress Met  -RB     STG 2 Pt to report at least a 25% reduction in pn  -RB     STG 2 Progress Not Met  -RB     STG 3 Pt to report no return of LE symptoms w/ daily activities.  -RB     STG 3 Progress Not Met  -RB     Long Term Goals    LTG Date to Achieve 10/24/17  -RB     LTG 1 Pt to be independent w/ long term HEP for strengthening, flexibility, and symptom mgmt  -RB     LTG 1 Progress Not Met  -RB     LTG 2 Pt to report at least a 50% reduction in pn  -RB     LTG 2 Progress Not Met  -RB     LTG 3 Pt to demo functional trunk ROM in all planes w/o increase in pn.  -RB     LTG 3 Progress Not Met  -RB     LTG 4 Pt to improve Mod Oswestry score by at least 8 pts to reflect improved pn and function.  -RB     LTG 4 Progress Not Met  -RB       User Key  (r) = Recorded By, (t) = Taken By, (c) = Cosigned By    Initials Name Provider Type    RB Daniela Gregory, PT Physical Therapist          OP PT Discharge Summary  Date of Discharge: 17  Reason for Discharge: Lack of progress  Outcomes Achieved: Unable to make functional progress toward goals at this time  Discharge Destination: Home without follow-up  Discharge Instructions: Pt failed to make significant progress w/ pn or mobility after 6 PT visits, and was d/c to follow back up w/ MD.      Time Calculation:                    Daniela Gregory, PT  2017

## 2017-11-30 ENCOUNTER — OFFICE VISIT (OUTPATIENT)
Dept: NEUROSURGERY | Facility: CLINIC | Age: 62
End: 2017-11-30

## 2017-11-30 VITALS
BODY MASS INDEX: 29.95 KG/M2 | DIASTOLIC BLOOD PRESSURE: 74 MMHG | HEIGHT: 67 IN | SYSTOLIC BLOOD PRESSURE: 130 MMHG | WEIGHT: 190.8 LBS | TEMPERATURE: 99 F

## 2017-11-30 DIAGNOSIS — M48.062 SPINAL STENOSIS, LUMBAR REGION, WITH NEUROGENIC CLAUDICATION: Primary | ICD-10-CM

## 2017-11-30 DIAGNOSIS — M48.062 LUMBAR STENOSIS WITH NEUROGENIC CLAUDICATION: ICD-10-CM

## 2017-11-30 PROCEDURE — 99024 POSTOP FOLLOW-UP VISIT: CPT | Performed by: NEUROLOGICAL SURGERY

## 2018-01-12 ENCOUNTER — OFFICE VISIT (OUTPATIENT)
Dept: INTERNAL MEDICINE | Facility: CLINIC | Age: 63
End: 2018-01-12

## 2018-01-12 VITALS
HEART RATE: 70 BPM | WEIGHT: 193 LBS | DIASTOLIC BLOOD PRESSURE: 76 MMHG | SYSTOLIC BLOOD PRESSURE: 138 MMHG | OXYGEN SATURATION: 98 % | BODY MASS INDEX: 30.23 KG/M2

## 2018-01-12 DIAGNOSIS — I15.8 OTHER SECONDARY HYPERTENSION: Primary | ICD-10-CM

## 2018-01-12 PROCEDURE — 99213 OFFICE O/P EST LOW 20 MIN: CPT | Performed by: PHYSICIAN ASSISTANT

## 2018-01-12 NOTE — PROGRESS NOTES
Chief Complaint   Patient presents with   • Follow-up     hypertension       Subjective   Russell Soto is a 62 y.o. male.       History of Present Illness     Pt has been off lisinopril for about 2 months. He has been checking his blood pressure at home and it runs around 130 systolic and 70-80s diastolic. Back pain continues to improve, good follow up with NS. Happy with results.      Current Outpatient Prescriptions:   •  lisinopril (PRINIVIL,ZESTRIL) 5 MG tablet, Take 1 tablet by mouth Daily., Disp: 30 tablet, Rfl: 2     PMFSH  The following portions of the patient's history were reviewed and updated as appropriate: allergies, current medications, past family history, past medical history, past social history, past surgical history and problem list.    Review of Systems   Constitutional: Negative for appetite change, fever and unexpected weight change.   HENT: Negative.    Eyes: Negative for pain and visual disturbance.   Respiratory: Negative for chest tightness, shortness of breath and wheezing.    Cardiovascular: Negative for chest pain and palpitations.   Gastrointestinal: Negative for abdominal pain, blood in stool, diarrhea, nausea and vomiting.   Endocrine: Negative.    Genitourinary: Negative for difficulty urinating, flank pain, frequency and urgency.   Musculoskeletal: Positive for back pain. Negative for joint swelling.   Skin: Negative for color change and rash.   Neurological: Negative for tremors and weakness.   Hematological: Negative for adenopathy.   Psychiatric/Behavioral: Negative for confusion and decreased concentration.   All other systems reviewed and are negative.      Objective   /76  Pulse 70  Wt 87.5 kg (193 lb)  SpO2 98%  BMI 30.23 kg/m2    Physical Exam   Constitutional: He appears well-developed and well-nourished.   HENT:   Head: Normocephalic.   Right Ear: Hearing, tympanic membrane, external ear and ear canal normal.   Left Ear: Hearing, tympanic membrane, external  ear and ear canal normal.   Nose: Nose normal.   Mouth/Throat: Oropharynx is clear and moist.   Eyes: Conjunctivae are normal. Pupils are equal, round, and reactive to light.   Neck: Normal range of motion.   Cardiovascular: Normal rate, regular rhythm and normal heart sounds.    Pulmonary/Chest: Effort normal and breath sounds normal. He has no decreased breath sounds. He has no wheezes. He has no rhonchi. He has no rales.   Musculoskeletal: Normal range of motion.   Neurological: He is alert.   Skin: Skin is warm and dry.   Psychiatric: He has a normal mood and affect. His behavior is normal.   Nursing note and vitals reviewed.           ASSESSMENT/PLAN    Problem List Items Addressed This Visit        Cardiovascular and Mediastinum    Hypertension - Primary     Hypertension is resolved with resolution of back pain.  Continue current treatment regimen.  Regular aerobic exercise.  Ambulatory blood pressure monitoring.  Blood pressure will be reassessed at the next regular appointment.                    Return for Next scheduled follow up.

## 2018-01-12 NOTE — ASSESSMENT & PLAN NOTE
Hypertension is resolved with resolution of back pain.  Continue current treatment regimen.  Regular aerobic exercise.  Ambulatory blood pressure monitoring.  Blood pressure will be reassessed at the next regular appointment.

## 2018-08-16 ENCOUNTER — OFFICE VISIT (OUTPATIENT)
Dept: INTERNAL MEDICINE | Facility: CLINIC | Age: 63
End: 2018-08-16

## 2018-08-16 VITALS
SYSTOLIC BLOOD PRESSURE: 128 MMHG | WEIGHT: 186 LBS | OXYGEN SATURATION: 98 % | DIASTOLIC BLOOD PRESSURE: 72 MMHG | HEIGHT: 67 IN | HEART RATE: 57 BPM | BODY MASS INDEX: 29.19 KG/M2

## 2018-08-16 DIAGNOSIS — Z87.891 PERSONAL HISTORY OF TOBACCO USE, PRESENTING HAZARDS TO HEALTH: ICD-10-CM

## 2018-08-16 DIAGNOSIS — I15.8 OTHER SECONDARY HYPERTENSION: ICD-10-CM

## 2018-08-16 DIAGNOSIS — D64.9 ANEMIA, UNSPECIFIED TYPE: ICD-10-CM

## 2018-08-16 DIAGNOSIS — E78.5 HYPERLIPIDEMIA, UNSPECIFIED HYPERLIPIDEMIA TYPE: ICD-10-CM

## 2018-08-16 DIAGNOSIS — Z00.00 WELCOME TO MEDICARE PREVENTIVE VISIT: Primary | ICD-10-CM

## 2018-08-16 LAB
ALBUMIN SERPL-MCNC: 4.42 G/DL (ref 3.2–4.8)
ALBUMIN/GLOB SERPL: 1.9 G/DL (ref 1.5–2.5)
ALP SERPL-CCNC: 70 U/L (ref 25–100)
ALT SERPL W P-5'-P-CCNC: 22 U/L (ref 7–40)
ANION GAP SERPL CALCULATED.3IONS-SCNC: 4 MMOL/L (ref 3–11)
ARTICHOKE IGE QN: 157 MG/DL (ref 0–130)
AST SERPL-CCNC: 24 U/L (ref 0–33)
BASOPHILS # BLD AUTO: 0.04 10*3/MM3 (ref 0–0.2)
BASOPHILS NFR BLD AUTO: 0.8 % (ref 0–1)
BILIRUB SERPL-MCNC: 0.7 MG/DL (ref 0.3–1.2)
BUN BLD-MCNC: 9 MG/DL (ref 9–23)
BUN/CREAT SERPL: 12.5 (ref 7–25)
CALCIUM SPEC-SCNC: 9.2 MG/DL (ref 8.7–10.4)
CHLORIDE SERPL-SCNC: 103 MMOL/L (ref 99–109)
CHOLEST SERPL-MCNC: 234 MG/DL (ref 0–200)
CO2 SERPL-SCNC: 29 MMOL/L (ref 20–31)
CREAT BLD-MCNC: 0.72 MG/DL (ref 0.6–1.3)
DEPRECATED RDW RBC AUTO: 42 FL (ref 37–54)
EOSINOPHIL # BLD AUTO: 0.06 10*3/MM3 (ref 0–0.3)
EOSINOPHIL NFR BLD AUTO: 1.3 % (ref 0–3)
ERYTHROCYTE [DISTWIDTH] IN BLOOD BY AUTOMATED COUNT: 12.2 % (ref 11.3–14.5)
GFR SERPL CREATININE-BSD FRML MDRD: 110 ML/MIN/1.73
GLOBULIN UR ELPH-MCNC: 2.4 GM/DL
GLUCOSE BLD-MCNC: 106 MG/DL (ref 70–100)
HCT VFR BLD AUTO: 38.6 % (ref 38.9–50.9)
HDLC SERPL-MCNC: 54 MG/DL (ref 40–60)
HGB BLD-MCNC: 13.1 G/DL (ref 13.1–17.5)
IMM GRANULOCYTES # BLD: 0.02 10*3/MM3 (ref 0–0.03)
IMM GRANULOCYTES NFR BLD: 0.4 % (ref 0–0.6)
LYMPHOCYTES # BLD AUTO: 1.65 10*3/MM3 (ref 0.6–4.8)
LYMPHOCYTES NFR BLD AUTO: 34.4 % (ref 24–44)
MCH RBC QN AUTO: 32.2 PG (ref 27–31)
MCHC RBC AUTO-ENTMCNC: 33.9 G/DL (ref 32–36)
MCV RBC AUTO: 94.8 FL (ref 80–99)
MONOCYTES # BLD AUTO: 0.35 10*3/MM3 (ref 0–1)
MONOCYTES NFR BLD AUTO: 7.3 % (ref 0–12)
NEUTROPHILS # BLD AUTO: 2.68 10*3/MM3 (ref 1.5–8.3)
NEUTROPHILS NFR BLD AUTO: 55.8 % (ref 41–71)
PLATELET # BLD AUTO: 231 10*3/MM3 (ref 150–450)
PMV BLD AUTO: 9.3 FL (ref 6–12)
POTASSIUM BLD-SCNC: 4 MMOL/L (ref 3.5–5.5)
PROT SERPL-MCNC: 6.8 G/DL (ref 5.7–8.2)
RBC # BLD AUTO: 4.07 10*6/MM3 (ref 4.2–5.76)
SODIUM BLD-SCNC: 136 MMOL/L (ref 132–146)
TRIGL SERPL-MCNC: 247 MG/DL (ref 0–150)
WBC NRBC COR # BLD: 4.8 10*3/MM3 (ref 3.5–10.8)

## 2018-08-16 PROCEDURE — G0402 INITIAL PREVENTIVE EXAM: HCPCS | Performed by: PHYSICIAN ASSISTANT

## 2018-08-16 PROCEDURE — 80061 LIPID PANEL: CPT | Performed by: PHYSICIAN ASSISTANT

## 2018-08-16 PROCEDURE — 80053 COMPREHEN METABOLIC PANEL: CPT | Performed by: PHYSICIAN ASSISTANT

## 2018-08-16 PROCEDURE — 85025 COMPLETE CBC W/AUTO DIFF WBC: CPT | Performed by: PHYSICIAN ASSISTANT

## 2018-08-17 PROCEDURE — 96160 PT-FOCUSED HLTH RISK ASSMT: CPT | Performed by: PHYSICIAN ASSISTANT

## 2018-08-17 PROCEDURE — G0403 EKG FOR INITIAL PREVENT EXAM: HCPCS | Performed by: PHYSICIAN ASSISTANT

## 2018-08-17 RX ORDER — LISINOPRIL 5 MG/1
5 TABLET ORAL DAILY
Qty: 90 TABLET | Refills: 11 | Status: SHIPPED | OUTPATIENT
Start: 2018-08-17 | End: 2019-08-30

## 2018-08-27 DIAGNOSIS — E78.5 HYPERLIPIDEMIA, UNSPECIFIED HYPERLIPIDEMIA TYPE: Primary | ICD-10-CM

## 2018-08-27 RX ORDER — ATORVASTATIN CALCIUM 10 MG/1
10 TABLET, FILM COATED ORAL DAILY
Qty: 30 TABLET | Refills: 11 | Status: SHIPPED | OUTPATIENT
Start: 2018-08-27 | End: 2019-08-30

## 2018-09-11 ENCOUNTER — HOSPITAL ENCOUNTER (OUTPATIENT)
Dept: ULTRASOUND IMAGING | Facility: HOSPITAL | Age: 63
Discharge: HOME | End: 2018-09-11
Admitting: PHYSICIAN ASSISTANT

## 2018-09-11 DIAGNOSIS — Z87.891 PERSONAL HISTORY OF TOBACCO USE, PRESENTING HAZARDS TO HEALTH: ICD-10-CM

## 2018-09-11 PROCEDURE — 76706 US ABDL AORTA SCREEN AAA: CPT

## 2019-08-30 ENCOUNTER — OFFICE VISIT (OUTPATIENT)
Dept: INTERNAL MEDICINE | Facility: CLINIC | Age: 64
End: 2019-08-30

## 2019-08-30 VITALS
SYSTOLIC BLOOD PRESSURE: 140 MMHG | HEART RATE: 80 BPM | HEIGHT: 67 IN | DIASTOLIC BLOOD PRESSURE: 90 MMHG | BODY MASS INDEX: 29.19 KG/M2 | WEIGHT: 186 LBS | OXYGEN SATURATION: 95 %

## 2019-08-30 DIAGNOSIS — E78.5 HYPERLIPIDEMIA, UNSPECIFIED HYPERLIPIDEMIA TYPE: ICD-10-CM

## 2019-08-30 DIAGNOSIS — I15.8 OTHER SECONDARY HYPERTENSION: ICD-10-CM

## 2019-08-30 DIAGNOSIS — Z00.00 ENCOUNTER FOR MEDICARE ANNUAL WELLNESS EXAM: Primary | ICD-10-CM

## 2019-08-30 PROCEDURE — 96160 PT-FOCUSED HLTH RISK ASSMT: CPT | Performed by: PHYSICIAN ASSISTANT

## 2019-08-30 PROCEDURE — 99397 PER PM REEVAL EST PAT 65+ YR: CPT | Performed by: PHYSICIAN ASSISTANT

## 2019-08-30 PROCEDURE — G0438 PPPS, INITIAL VISIT: HCPCS | Performed by: PHYSICIAN ASSISTANT

## 2019-08-31 LAB
ALBUMIN SERPL-MCNC: 4.8 G/DL (ref 3.5–5.2)
ALBUMIN/GLOB SERPL: 2 G/DL
ALP SERPL-CCNC: 67 U/L (ref 39–117)
ALT SERPL-CCNC: 21 U/L (ref 1–41)
AST SERPL-CCNC: 25 U/L (ref 1–40)
BASOPHILS # BLD AUTO: 0.06 10*3/MM3 (ref 0–0.2)
BASOPHILS NFR BLD AUTO: 1.1 % (ref 0–1.5)
BILIRUB SERPL-MCNC: 0.4 MG/DL (ref 0.2–1.2)
BUN SERPL-MCNC: 9 MG/DL (ref 8–23)
BUN/CREAT SERPL: 12 (ref 7–25)
CALCIUM SERPL-MCNC: 9.6 MG/DL (ref 8.6–10.5)
CHLORIDE SERPL-SCNC: 100 MMOL/L (ref 98–107)
CHOLEST SERPL-MCNC: 240 MG/DL (ref 0–200)
CO2 SERPL-SCNC: 27.5 MMOL/L (ref 22–29)
CREAT SERPL-MCNC: 0.75 MG/DL (ref 0.76–1.27)
EOSINOPHIL # BLD AUTO: 0.07 10*3/MM3 (ref 0–0.4)
EOSINOPHIL NFR BLD AUTO: 1.3 % (ref 0.3–6.2)
ERYTHROCYTE [DISTWIDTH] IN BLOOD BY AUTOMATED COUNT: 12.4 % (ref 12.3–15.4)
GLOBULIN SER CALC-MCNC: 2.4 GM/DL
GLUCOSE SERPL-MCNC: 106 MG/DL (ref 65–99)
HCT VFR BLD AUTO: 42.6 % (ref 37.5–51)
HDLC SERPL-MCNC: 53 MG/DL (ref 40–60)
HGB BLD-MCNC: 14 G/DL (ref 13–17.7)
IMM GRANULOCYTES # BLD AUTO: 0.02 10*3/MM3 (ref 0–0.05)
IMM GRANULOCYTES NFR BLD AUTO: 0.4 % (ref 0–0.5)
LDLC SERPL CALC-MCNC: 147 MG/DL (ref 0–100)
LYMPHOCYTES # BLD AUTO: 1.74 10*3/MM3 (ref 0.7–3.1)
LYMPHOCYTES NFR BLD AUTO: 31.6 % (ref 19.6–45.3)
MCH RBC QN AUTO: 32.5 PG (ref 26.6–33)
MCHC RBC AUTO-ENTMCNC: 32.9 G/DL (ref 31.5–35.7)
MCV RBC AUTO: 98.8 FL (ref 79–97)
MONOCYTES # BLD AUTO: 0.38 10*3/MM3 (ref 0.1–0.9)
MONOCYTES NFR BLD AUTO: 6.9 % (ref 5–12)
NEUTROPHILS # BLD AUTO: 3.24 10*3/MM3 (ref 1.7–7)
NEUTROPHILS NFR BLD AUTO: 58.7 % (ref 42.7–76)
NRBC BLD AUTO-RTO: 0 /100 WBC (ref 0–0.2)
PLATELET # BLD AUTO: 245 10*3/MM3 (ref 140–450)
POTASSIUM SERPL-SCNC: 4.3 MMOL/L (ref 3.5–5.2)
PROT SERPL-MCNC: 7.2 G/DL (ref 6–8.5)
RBC # BLD AUTO: 4.31 10*6/MM3 (ref 4.14–5.8)
SODIUM SERPL-SCNC: 140 MMOL/L (ref 136–145)
TRIGL SERPL-MCNC: 198 MG/DL (ref 0–150)
VLDLC SERPL CALC-MCNC: 39.6 MG/DL
WBC # BLD AUTO: 5.51 10*3/MM3 (ref 3.4–10.8)

## 2020-02-17 ENCOUNTER — TELEPHONE (OUTPATIENT)
Dept: INTERNAL MEDICINE | Facility: CLINIC | Age: 65
End: 2020-02-17

## 2020-02-17 DIAGNOSIS — I15.8 OTHER SECONDARY HYPERTENSION: ICD-10-CM

## 2020-02-17 RX ORDER — LISINOPRIL 5 MG/1
5 TABLET ORAL DAILY
Qty: 90 TABLET | Refills: 1 | Status: SHIPPED | OUTPATIENT
Start: 2020-02-17 | End: 2020-08-17 | Stop reason: SDUPTHER

## 2020-02-17 NOTE — TELEPHONE ENCOUNTER
Looks like he was on lisinopril 5 mg sent in for 1 yr on 8/17/18, was last seen for wellness on 8/30/ 2019,

## 2020-02-17 NOTE — TELEPHONE ENCOUNTER
Patient called to see if Sheryl Cevallos could re-write him a prescription for his hypertension medication (patient could not remember the name).     [facility]  Confirmed.     Patient call back: [phone]

## 2020-03-21 ENCOUNTER — APPOINTMENT (OUTPATIENT)
Dept: MRI IMAGING | Facility: HOSPITAL | Age: 65
End: 2020-03-21

## 2020-03-21 ENCOUNTER — APPOINTMENT (OUTPATIENT)
Dept: GENERAL RADIOLOGY | Facility: HOSPITAL | Age: 65
End: 2020-03-21

## 2020-03-21 ENCOUNTER — HOSPITAL ENCOUNTER (EMERGENCY)
Facility: HOSPITAL | Age: 65
Discharge: HOME | End: 2020-03-21
Attending: EMERGENCY MEDICINE | Admitting: EMERGENCY MEDICINE

## 2020-03-21 VITALS
HEIGHT: 67 IN | OXYGEN SATURATION: 92 % | WEIGHT: 190 LBS | DIASTOLIC BLOOD PRESSURE: 77 MMHG | RESPIRATION RATE: 18 BRPM | BODY MASS INDEX: 29.82 KG/M2 | TEMPERATURE: 97.5 F | HEART RATE: 54 BPM | SYSTOLIC BLOOD PRESSURE: 137 MMHG

## 2020-03-21 DIAGNOSIS — R11.10 ACUTE VERTIGO WITH VOMITING AND INABILITY TO STAND: Primary | ICD-10-CM

## 2020-03-21 DIAGNOSIS — R42 ACUTE VERTIGO WITH VOMITING AND INABILITY TO STAND: Primary | ICD-10-CM

## 2020-03-21 LAB
ALBUMIN SERPL-MCNC: 4.6 G/DL (ref 3.5–5.2)
ALBUMIN/GLOB SERPL: 1.8 G/DL
ALP SERPL-CCNC: 56 U/L (ref 39–117)
ALT SERPL W P-5'-P-CCNC: 17 U/L (ref 1–41)
ANION GAP SERPL CALCULATED.3IONS-SCNC: 21 MMOL/L (ref 5–15)
AST SERPL-CCNC: 22 U/L (ref 1–40)
BASOPHILS # BLD AUTO: 0.05 10*3/MM3 (ref 0–0.2)
BASOPHILS NFR BLD AUTO: 0.7 % (ref 0–1.5)
BILIRUB SERPL-MCNC: 0.3 MG/DL (ref 0.2–1.2)
BILIRUB UR QL STRIP: NEGATIVE
BUN BLD-MCNC: 12 MG/DL (ref 8–23)
BUN/CREAT SERPL: 17.6 (ref 7–25)
CALCIUM SPEC-SCNC: 9.3 MG/DL (ref 8.6–10.5)
CHLORIDE SERPL-SCNC: 98 MMOL/L (ref 98–107)
CLARITY UR: CLEAR
CO2 SERPL-SCNC: 18 MMOL/L (ref 22–29)
COLOR UR: YELLOW
CREAT BLD-MCNC: 0.68 MG/DL (ref 0.76–1.27)
DEPRECATED RDW RBC AUTO: 38.8 FL (ref 37–54)
EOSINOPHIL # BLD AUTO: 0.08 10*3/MM3 (ref 0–0.4)
EOSINOPHIL NFR BLD AUTO: 1.1 % (ref 0.3–6.2)
ERYTHROCYTE [DISTWIDTH] IN BLOOD BY AUTOMATED COUNT: 11.7 % (ref 12.3–15.4)
GFR SERPL CREATININE-BSD FRML MDRD: 116 ML/MIN/1.73
GLOBULIN UR ELPH-MCNC: 2.6 GM/DL
GLUCOSE BLD-MCNC: 203 MG/DL (ref 65–99)
GLUCOSE UR STRIP-MCNC: ABNORMAL MG/DL
HCT VFR BLD AUTO: 39.1 % (ref 37.5–51)
HGB BLD-MCNC: 13.9 G/DL (ref 13–17.7)
HGB UR QL STRIP.AUTO: NEGATIVE
HOLD SPECIMEN: NORMAL
HOLD SPECIMEN: NORMAL
IMM GRANULOCYTES # BLD AUTO: 0.04 10*3/MM3 (ref 0–0.05)
IMM GRANULOCYTES NFR BLD AUTO: 0.5 % (ref 0–0.5)
KETONES UR QL STRIP: ABNORMAL
LEUKOCYTE ESTERASE UR QL STRIP.AUTO: NEGATIVE
LYMPHOCYTES # BLD AUTO: 2.38 10*3/MM3 (ref 0.7–3.1)
LYMPHOCYTES NFR BLD AUTO: 31.8 % (ref 19.6–45.3)
MAGNESIUM SERPL-MCNC: 1.8 MG/DL (ref 1.6–2.4)
MCH RBC QN AUTO: 32.5 PG (ref 26.6–33)
MCHC RBC AUTO-ENTMCNC: 35.5 G/DL (ref 31.5–35.7)
MCV RBC AUTO: 91.4 FL (ref 79–97)
MONOCYTES # BLD AUTO: 0.48 10*3/MM3 (ref 0.1–0.9)
MONOCYTES NFR BLD AUTO: 6.4 % (ref 5–12)
NEUTROPHILS # BLD AUTO: 4.46 10*3/MM3 (ref 1.7–7)
NEUTROPHILS NFR BLD AUTO: 59.5 % (ref 42.7–76)
NITRITE UR QL STRIP: NEGATIVE
NRBC BLD AUTO-RTO: 0 /100 WBC (ref 0–0.2)
PH UR STRIP.AUTO: 6 [PH] (ref 5–8)
PLATELET # BLD AUTO: 231 10*3/MM3 (ref 140–450)
PMV BLD AUTO: 9.8 FL (ref 6–12)
POTASSIUM BLD-SCNC: 2.7 MMOL/L (ref 3.5–5.2)
PROT SERPL-MCNC: 7.2 G/DL (ref 6–8.5)
PROT UR QL STRIP: NEGATIVE
RBC # BLD AUTO: 4.28 10*6/MM3 (ref 4.14–5.8)
SODIUM BLD-SCNC: 137 MMOL/L (ref 136–145)
SP GR UR STRIP: 1.01 (ref 1–1.03)
TROPONIN T SERPL-MCNC: <0.01 NG/ML (ref 0–0.03)
UROBILINOGEN UR QL STRIP: ABNORMAL
WBC NRBC COR # BLD: 7.49 10*3/MM3 (ref 3.4–10.8)
WHOLE BLOOD HOLD SPECIMEN: NORMAL
WHOLE BLOOD HOLD SPECIMEN: NORMAL

## 2020-03-21 PROCEDURE — 70551 MRI BRAIN STEM W/O DYE: CPT

## 2020-03-21 PROCEDURE — 51798 US URINE CAPACITY MEASURE: CPT

## 2020-03-21 PROCEDURE — 99284 EMERGENCY DEPT VISIT MOD MDM: CPT

## 2020-03-21 PROCEDURE — 80053 COMPREHEN METABOLIC PANEL: CPT | Performed by: EMERGENCY MEDICINE

## 2020-03-21 PROCEDURE — 83735 ASSAY OF MAGNESIUM: CPT | Performed by: EMERGENCY MEDICINE

## 2020-03-21 PROCEDURE — 85025 COMPLETE CBC W/AUTO DIFF WBC: CPT | Performed by: EMERGENCY MEDICINE

## 2020-03-21 PROCEDURE — 84484 ASSAY OF TROPONIN QUANT: CPT | Performed by: EMERGENCY MEDICINE

## 2020-03-21 PROCEDURE — 93005 ELECTROCARDIOGRAM TRACING: CPT

## 2020-03-21 PROCEDURE — 81003 URINALYSIS AUTO W/O SCOPE: CPT | Performed by: EMERGENCY MEDICINE

## 2020-03-21 PROCEDURE — 96374 THER/PROPH/DIAG INJ IV PUSH: CPT

## 2020-03-21 PROCEDURE — 93005 ELECTROCARDIOGRAM TRACING: CPT | Performed by: EMERGENCY MEDICINE

## 2020-03-21 PROCEDURE — 71045 X-RAY EXAM CHEST 1 VIEW: CPT

## 2020-03-21 PROCEDURE — 25010000002 DIMENHYDRINATE 50 MG/ML SOLUTION: Performed by: PHYSICIAN ASSISTANT

## 2020-03-21 RX ORDER — MECLIZINE HYDROCHLORIDE 25 MG/1
25 TABLET ORAL 4 TIMES DAILY PRN
Qty: 30 TABLET | Refills: 0 | Status: SHIPPED | OUTPATIENT
Start: 2020-03-21 | End: 2021-09-10

## 2020-03-21 RX ORDER — SODIUM CHLORIDE 0.9 % (FLUSH) 0.9 %
10 SYRINGE (ML) INJECTION AS NEEDED
Status: DISCONTINUED | OUTPATIENT
Start: 2020-03-21 | End: 2020-03-21 | Stop reason: HOSPADM

## 2020-03-21 RX ORDER — METHYLPREDNISOLONE 4 MG/1
TABLET ORAL
Qty: 21 TABLET | Refills: 0 | Status: SHIPPED | OUTPATIENT
Start: 2020-03-21 | End: 2020-09-01

## 2020-03-21 RX ORDER — DIMENHYDRINATE 50 MG/ML
50 INJECTION, SOLUTION INTRAMUSCULAR; INTRAVENOUS ONCE
Status: COMPLETED | OUTPATIENT
Start: 2020-03-21 | End: 2020-03-21

## 2020-03-21 RX ADMIN — SODIUM CHLORIDE 1000 ML: 9 INJECTION, SOLUTION INTRAVENOUS at 13:38

## 2020-03-21 RX ADMIN — DIMENHYDRINATE 50 MG: 50 INJECTION, SOLUTION INTRAMUSCULAR; INTRAVENOUS at 13:38

## 2020-03-23 ENCOUNTER — EPISODE CHANGES (OUTPATIENT)
Dept: CASE MANAGEMENT | Facility: OTHER | Age: 65
End: 2020-03-23

## 2020-03-24 ENCOUNTER — PATIENT OUTREACH (OUTPATIENT)
Dept: CASE MANAGEMENT | Facility: OTHER | Age: 65
End: 2020-03-24

## 2020-03-24 NOTE — OUTREACH NOTE
Care Coordination Assessment    Documented/Reviewed By:  Ashia Rayo RN Date/time:  3/24/2020  1:48 PM   Assessment completed with:  spouse or significant other  Living arrangement:  spouse  Type of residence:  private residence  Home care services:  No  Equipment used at home:  cane  Communication device:  No  Bed or wheelchair confined:  No  History of fall(s) in last 6 months:  No

## 2020-03-24 NOTE — OUTREACH NOTE
"Care Plan Note      Responses   Annual Wellness Visit:   Patient Has Completed   Care Gaps Addressed  Colon Cancer Screening   Colon Cancer Screening Type  Colonoscopy   Colonoscopy Status  Up to Date (< 10 yrs) [2012]   Other Patient Education/Resources   24/7 [facility] Nurse Call Line   24/7 Nurse Call Line Education Method  Verbal        The main concerns and/or symptoms the patient would like to address are: ED visit 3/21/2020 for dizziness.  He was sleeping at time of call, but wife states he is doing some better today.  Able to keep solids down now.  Has intermittent dizziness along with n/v.  She states he is drinking enough to stay hydrated.  At the recommendation of ED, they did see ENT, Dr. Kishore Anderson yest and he did not add any medicines, however recommended trying \"to decrease the Antivert.\"    He has been on Lisinopril for HTN since 2/21/2020.  States they monitor bp at home and has been better.  When reading about Lisinopril, she noticed it can cause dizziness, and she reports that she forgot to tell Dr. Anderson about this.  She is going to get in touch with Sheryl Cevallos's office to check about this, since she is the one that started him on it.  He usually is totally independent with ambulating, but did have a cane at home that he was able to use yest when going to doctor office.  He has not fallen.  Lives with spouse.  She voiced appreciation that \"[facility] is reaching out to check on him.\"    Education/instruction provided by Care Coordinator:  Role of  explained and contact number given.  [facility] 24/7 Nurse line explained and contact number provided.      Follow Up Outreach Due: ACM will try to call back in 1-2 weeks to see how he is doing and to discuss more of his health maintenance issues with pt.      Ashia Rayo RN  Ambulatory     3/24/2020, 13:51    "

## 2020-04-02 ENCOUNTER — EPISODE CHANGES (OUTPATIENT)
Dept: CASE MANAGEMENT | Facility: OTHER | Age: 65
End: 2020-04-02

## 2020-08-17 ENCOUNTER — TELEPHONE (OUTPATIENT)
Dept: INTERNAL MEDICINE | Facility: CLINIC | Age: 65
End: 2020-08-17

## 2020-08-17 DIAGNOSIS — I15.8 OTHER SECONDARY HYPERTENSION: ICD-10-CM

## 2020-08-17 RX ORDER — LISINOPRIL 5 MG/1
5 TABLET ORAL DAILY
Qty: 15 TABLET | Refills: 0 | Status: SHIPPED | OUTPATIENT
Start: 2020-08-17 | End: 2020-09-01 | Stop reason: SDUPTHER

## 2020-08-17 NOTE — TELEPHONE ENCOUNTER
Patient is calling stating they are out of their RX of lisinopril. Patient has 1 pill left.   Has appointment for wellness on 09/01, wanted to reach out to see if enough medication could be called into pharm to last patient until next appointment.   Confirmed correct pharm is on file. Please advise.

## 2020-09-01 ENCOUNTER — OFFICE VISIT (OUTPATIENT)
Dept: INTERNAL MEDICINE | Facility: CLINIC | Age: 65
End: 2020-09-01

## 2020-09-01 ENCOUNTER — LAB REQUISITION (OUTPATIENT)
Dept: LAB | Facility: HOSPITAL | Age: 65
End: 2020-09-01

## 2020-09-01 VITALS
HEIGHT: 67 IN | SYSTOLIC BLOOD PRESSURE: 138 MMHG | OXYGEN SATURATION: 98 % | WEIGHT: 191.4 LBS | BODY MASS INDEX: 30.04 KG/M2 | DIASTOLIC BLOOD PRESSURE: 82 MMHG | HEART RATE: 76 BPM | TEMPERATURE: 97.6 F

## 2020-09-01 DIAGNOSIS — E78.5 HYPERLIPIDEMIA, UNSPECIFIED HYPERLIPIDEMIA TYPE: ICD-10-CM

## 2020-09-01 DIAGNOSIS — E78.5 HYPERLIPIDEMIA, UNSPECIFIED: ICD-10-CM

## 2020-09-01 DIAGNOSIS — D64.9 ANEMIA, UNSPECIFIED TYPE: ICD-10-CM

## 2020-09-01 DIAGNOSIS — N40.0 BENIGN PROSTATIC HYPERPLASIA WITHOUT LOWER URINARY TRACT SYMPTOMS: ICD-10-CM

## 2020-09-01 DIAGNOSIS — Z12.5 PROSTATE CANCER SCREENING: ICD-10-CM

## 2020-09-01 DIAGNOSIS — D64.9 ANEMIA, UNSPECIFIED: ICD-10-CM

## 2020-09-01 DIAGNOSIS — Z12.5 ENCOUNTER FOR SCREENING FOR MALIGNANT NEOPLASM OF PROSTATE: ICD-10-CM

## 2020-09-01 DIAGNOSIS — I15.8 OTHER SECONDARY HYPERTENSION: ICD-10-CM

## 2020-09-01 DIAGNOSIS — Z00.00 ENCOUNTER FOR MEDICARE ANNUAL WELLNESS EXAM: Primary | ICD-10-CM

## 2020-09-01 PROCEDURE — G0439 PPPS, SUBSEQ VISIT: HCPCS | Performed by: PHYSICIAN ASSISTANT

## 2020-09-01 PROCEDURE — G0009 ADMIN PNEUMOCOCCAL VACCINE: HCPCS | Performed by: PHYSICIAN ASSISTANT

## 2020-09-01 PROCEDURE — 96160 PT-FOCUSED HLTH RISK ASSMT: CPT | Performed by: PHYSICIAN ASSISTANT

## 2020-09-01 PROCEDURE — 90732 PPSV23 VACC 2 YRS+ SUBQ/IM: CPT | Performed by: PHYSICIAN ASSISTANT

## 2020-09-01 RX ORDER — LISINOPRIL 5 MG/1
5 TABLET ORAL DAILY
Qty: 90 TABLET | Refills: 3 | Status: SHIPPED | OUTPATIENT
Start: 2020-09-01 | End: 2021-09-10 | Stop reason: SDUPTHER

## 2020-09-01 NOTE — PROGRESS NOTES
The ABCs of the Annual Wellness Visit  Subsequent Medicare Wellness Visit    Chief Complaint   Patient presents with   • Medicare Wellness-subsequent       Subjective   History of Present Illness:  Russell Soto is a 65 y.o. male who presents for a Subsequent Medicare Wellness Visit.    HEALTH RISK ASSESSMENT    Recent Hospitalizations:  No hospitalization(s) within the last year.    Current Medical Providers:  Patient Care Team:  Sheryl Cevallos PA as PCP - General (Internal Medicine)  Sheryl Cevallos PA as Referring Physician (Internal Medicine)    Smoking Status:  Social History     Tobacco Use   Smoking Status Former Smoker   • Types: Cigarettes   • Last attempt to quit: 1997   • Years since quittin.9   Smokeless Tobacco Never Used       Alcohol Consumption:  Social History     Substance and Sexual Activity   Alcohol Use Yes    Comment: Social       Depression Screen:   PHQ-2/PHQ-9 Depression Screening 2020   Little interest or pleasure in doing things 0   Feeling down, depressed, or hopeless 0   Total Score 0       Fall Risk Screen:  KIRK Fall Risk Assessment was completed, and patient is at LOW risk for falls.Assessment completed on:2020    Health Habits and Functional and Cognitive Screening:  Functional & Cognitive Status 2020   Do you have difficulty preparing food and eating? No   Do you have difficulty bathing yourself, getting dressed or grooming yourself? No   Do you have difficulty using the toilet? No   Do you have difficulty moving around from place to place? No   Do you have trouble with steps or getting out of a bed or a chair? No   Current Diet Well Balanced Diet   Dental Exam Up to date   Eye Exam Not up to date   Exercise (times per week) 7 times per week   Current Exercise Activities Include Walking   Do you need help using the phone?  No   Are you deaf or do you have serious difficulty hearing?  No   Do you need help with transportation? No   Do you need help  shopping? No   Do you need help preparing meals?  No   Do you need help with housework?  No   Do you need help with laundry? No   Do you need help taking your medications? No   Do you need help managing money? No   Do you ever drive or ride in a car without wearing a seat belt? No   Have you felt unusual stress, anger or loneliness in the last month? No   Who do you live with? Spouse   If you need help, do you have trouble finding someone available to you? No   Have you been bothered in the last four weeks by sexual problems? No   Do you have difficulty concentrating, remembering or making decisions? No         Does the patient have evidence of cognitive impairment? No    Asprin use counseling:Does not need ASA (and currently is not on it)    Age-appropriate Screening Schedule:  Refer to the list below for future screening recommendations based on patient's age, sex and/or medical conditions. Orders for these recommended tests are listed in the plan section. The patient has been provided with a written plan.    Health Maintenance   Topic Date Due   • LIPID PANEL  08/30/2020   • INFLUENZA VACCINE  09/01/2021 (Originally 8/1/2020)   • ZOSTER VACCINE (2 of 2) 09/01/2021 (Originally 10/10/2017)   • COLONOSCOPY  09/08/2022   • TDAP/TD VACCINES (2 - Td) 07/25/2026          The following portions of the patient's history were reviewed and updated as appropriate: allergies, current medications, past family history, past medical history, past social history, past surgical history and problem list.    Outpatient Medications Prior to Visit   Medication Sig Dispense Refill   • lisinopril (PRINIVIL,ZESTRIL) 5 MG tablet Take 1 tablet by mouth Daily. 15 tablet 0   • meclizine (ANTIVERT) 25 MG tablet Take 1 tablet by mouth 4 (Four) Times a Day As Needed for Dizziness. 30 tablet 0   • methylPREDNISolone (MEDROL, BASIL,) 4 MG tablet Take as directed on package instructions. 21 tablet 0     No facility-administered medications prior to  "visit.        Patient Active Problem List   Diagnosis   • Sinus bradycardia   • BPH (benign prostatic hypertrophy)   • Anemia   • Palpitations   • Health care maintenance   • Hyperlipidemia   • Acute bilateral low back pain with bilateral sciatica   • Hypertension   • Sepsis (CMS/HCC)   • Spinal stenosis, lumbar region, with neurogenic claudication   • Lumbar stenosis with neurogenic claudication       Advanced Care Planning:  ACP discussion was held with the patient during this visit. Patient does not have an advance directive, declines further assistance.    Review of Systems   Constitutional: Negative for activity change, appetite change and fatigue.   HENT: Negative for congestion and rhinorrhea.    Respiratory: Negative for chest tightness and shortness of breath.    Cardiovascular: Negative for chest pain and palpitations.   Gastrointestinal: Negative for abdominal pain.   Genitourinary: Negative for dysuria.   Musculoskeletal: Negative for arthralgias and myalgias.   Neurological: Negative for dizziness, weakness, light-headedness and headaches.   Psychiatric/Behavioral: Negative for dysphoric mood. The patient is not nervous/anxious.        Compared to one year ago, the patient feels his physical health is worse. Some increased pain in his back but still able to walk everyday.  Compared to one year ago, the patient feels his mental health is the same.    Reviewed chart for potential of high risk medication in the elderly: yes  Reviewed chart for potential of harmful drug interactions in the elderly:yes    Objective         Vitals:    09/01/20 0816   BP: 138/82   Pulse: 76   Temp: 97.6 °F (36.4 °C)   SpO2: 98%   Weight: 86.8 kg (191 lb 6.4 oz)   Height: 170.2 cm (67\")   PainSc: 0-No pain       Body mass index is 29.98 kg/m².  Discussed the patient's BMI with him. The BMI is above average; BMI management plan is completed.    Physical Exam   Constitutional: He appears well-developed and well-nourished.   HENT: "   Head: Normocephalic.   Right Ear: Hearing, tympanic membrane, external ear and ear canal normal.   Left Ear: Hearing, tympanic membrane, external ear and ear canal normal.   Nose: Nose normal.   Mouth/Throat: Oropharynx is clear and moist.   Eyes: Pupils are equal, round, and reactive to light. Conjunctivae are normal.   Neck: Normal range of motion.   Cardiovascular: Normal rate, regular rhythm and normal heart sounds.   Pulmonary/Chest: Effort normal and breath sounds normal. He has no decreased breath sounds. He has no wheezes. He has no rhonchi. He has no rales.   Musculoskeletal: Normal range of motion.   Neurological: He is alert.   Skin: Skin is warm and dry.   Psychiatric: He has a normal mood and affect. His behavior is normal.   Nursing note and vitals reviewed.            Assessment/Plan   Medicare Risks and Personalized Health Plan  CMS Preventative Services Quick Reference  Advance Directive Discussion  Immunizations Discussed/Encouraged (specific immunizations; Pneumococcal 23 and Shingrix )  Prostate Cancer Screening     The above risks/problems have been discussed with the patient.  Pertinent information has been shared with the patient in the After Visit Summary.  Follow up plans and orders are seen below in the Assessment/Plan Section.    Diagnoses and all orders for this visit:    1. Encounter for Medicare annual wellness exam (Primary)    2. Other secondary hypertension  -     lisinopril (PRINIVIL,ZESTRIL) 5 MG tablet; Take 1 tablet by mouth Daily.  Dispense: 90 tablet; Refill: 3    3. Anemia, unspecified type  -     CBC & Differential; Future  -     CBC & Differential    4. Hyperlipidemia, unspecified hyperlipidemia type  -     Comprehensive Metabolic Panel; Future  -     Lipid Panel; Future  -     Lipid Panel  -     Comprehensive Metabolic Panel    5. Prostate cancer screening  -     PSA Screen; Future  -     PSA Screen    Other orders  -     Pneumococcal Polysaccharide Vaccine 23-Valent  (PPSV23) Greater Than or Equal To 3yo Subcutaneous / IM      Follow Up:  Return in about 1 year (around 9/1/2021) for Medicare Wellness.     An After Visit Summary and PPPS were given to the patient.

## 2020-09-02 LAB
ALBUMIN SERPL-MCNC: 4.9 G/DL (ref 3.5–5.2)
ALBUMIN/GLOB SERPL: 2.6 G/DL
ALP SERPL-CCNC: 61 U/L (ref 39–117)
ALT SERPL-CCNC: 18 U/L (ref 1–41)
AST SERPL-CCNC: 21 U/L (ref 1–40)
BASOPHILS # BLD AUTO: 0.07 10*3/MM3 (ref 0–0.2)
BASOPHILS NFR BLD AUTO: 1.1 % (ref 0–1.5)
BILIRUB SERPL-MCNC: 0.4 MG/DL (ref 0–1.2)
BUN SERPL-MCNC: 10 MG/DL (ref 8–23)
BUN/CREAT SERPL: 12 (ref 7–25)
CALCIUM SERPL-MCNC: 9.6 MG/DL (ref 8.6–10.5)
CHLORIDE SERPL-SCNC: 100 MMOL/L (ref 98–107)
CHOLEST SERPL-MCNC: 267 MG/DL (ref 0–200)
CO2 SERPL-SCNC: 27.1 MMOL/L (ref 22–29)
CREAT SERPL-MCNC: 0.83 MG/DL (ref 0.76–1.27)
EOSINOPHIL # BLD AUTO: 0.09 10*3/MM3 (ref 0–0.4)
EOSINOPHIL NFR BLD AUTO: 1.5 % (ref 0.3–6.2)
ERYTHROCYTE [DISTWIDTH] IN BLOOD BY AUTOMATED COUNT: 12.7 % (ref 12.3–15.4)
GLOBULIN SER CALC-MCNC: 1.9 GM/DL
GLUCOSE SERPL-MCNC: 108 MG/DL (ref 65–99)
HCT VFR BLD AUTO: 39.7 % (ref 37.5–51)
HDLC SERPL-MCNC: 53 MG/DL (ref 40–60)
HGB BLD-MCNC: 14.2 G/DL (ref 13–17.7)
IMM GRANULOCYTES # BLD AUTO: 0.02 10*3/MM3 (ref 0–0.05)
IMM GRANULOCYTES NFR BLD AUTO: 0.3 % (ref 0–0.5)
LDLC SERPL CALC-MCNC: 148 MG/DL (ref 0–100)
LYMPHOCYTES # BLD AUTO: 2.02 10*3/MM3 (ref 0.7–3.1)
LYMPHOCYTES NFR BLD AUTO: 32.9 % (ref 19.6–45.3)
MCH RBC QN AUTO: 32.8 PG (ref 26.6–33)
MCHC RBC AUTO-ENTMCNC: 35.8 G/DL (ref 31.5–35.7)
MCV RBC AUTO: 91.7 FL (ref 79–97)
MONOCYTES # BLD AUTO: 0.52 10*3/MM3 (ref 0.1–0.9)
MONOCYTES NFR BLD AUTO: 8.5 % (ref 5–12)
NEUTROPHILS # BLD AUTO: 3.42 10*3/MM3 (ref 1.7–7)
NEUTROPHILS NFR BLD AUTO: 55.7 % (ref 42.7–76)
NRBC BLD AUTO-RTO: 0 /100 WBC (ref 0–0.2)
PLATELET # BLD AUTO: 251 10*3/MM3 (ref 140–450)
POTASSIUM SERPL-SCNC: 5.3 MMOL/L (ref 3.5–5.2)
PROT SERPL-MCNC: 6.8 G/DL (ref 6–8.5)
PSA SERPL-MCNC: 0.11 NG/ML (ref 0–4)
RBC # BLD AUTO: 4.33 10*6/MM3 (ref 4.14–5.8)
SODIUM SERPL-SCNC: 139 MMOL/L (ref 136–145)
TRIGL SERPL-MCNC: 331 MG/DL (ref 0–150)
VLDLC SERPL CALC-MCNC: 66.2 MG/DL
WBC # BLD AUTO: 6.14 10*3/MM3 (ref 3.4–10.8)

## 2020-09-10 NOTE — PROGRESS NOTES
Your recent labs show continued elevation of your cholesterol. Enough that we should consider a low dose of cholesterol medication. Most people tolerate Crestor 5 mg without any problem.    Otherwise your labs look fine!

## 2020-09-22 ENCOUNTER — PATIENT MESSAGE (OUTPATIENT)
Dept: INTERNAL MEDICINE | Facility: CLINIC | Age: 65
End: 2020-09-22

## 2020-09-22 RX ORDER — ROSUVASTATIN CALCIUM 5 MG/1
5 TABLET, COATED ORAL DAILY
Qty: 90 TABLET | Refills: 3 | Status: SHIPPED | OUTPATIENT
Start: 2020-09-22 | End: 2021-09-10 | Stop reason: SDUPTHER

## 2021-04-01 ENCOUNTER — IMMUNIZATION (OUTPATIENT)
Dept: VACCINE CLINIC | Facility: HOSPITAL | Age: 66
End: 2021-04-01

## 2021-04-01 PROCEDURE — 0001A: CPT | Performed by: INTERNAL MEDICINE

## 2021-04-01 PROCEDURE — 91300 HC SARSCOV02 VAC 30MCG/0.3ML IM: CPT | Performed by: INTERNAL MEDICINE

## 2021-04-22 ENCOUNTER — IMMUNIZATION (OUTPATIENT)
Dept: VACCINE CLINIC | Facility: HOSPITAL | Age: 66
End: 2021-04-22

## 2021-04-22 PROCEDURE — 0002A: CPT | Performed by: INTERNAL MEDICINE

## 2021-04-22 PROCEDURE — 91300 HC SARSCOV02 VAC 30MCG/0.3ML IM: CPT | Performed by: INTERNAL MEDICINE

## 2021-09-10 ENCOUNTER — OFFICE VISIT (OUTPATIENT)
Dept: INTERNAL MEDICINE | Facility: CLINIC | Age: 66
End: 2021-09-10

## 2021-09-10 VITALS
WEIGHT: 193.2 LBS | SYSTOLIC BLOOD PRESSURE: 156 MMHG | OXYGEN SATURATION: 97 % | BODY MASS INDEX: 30.26 KG/M2 | HEART RATE: 54 BPM | DIASTOLIC BLOOD PRESSURE: 80 MMHG

## 2021-09-10 DIAGNOSIS — E78.5 HYPERLIPIDEMIA, UNSPECIFIED HYPERLIPIDEMIA TYPE: ICD-10-CM

## 2021-09-10 DIAGNOSIS — E55.9 VITAMIN D DEFICIENCY: ICD-10-CM

## 2021-09-10 DIAGNOSIS — R00.2 PALPITATIONS: ICD-10-CM

## 2021-09-10 DIAGNOSIS — I15.8 OTHER SECONDARY HYPERTENSION: ICD-10-CM

## 2021-09-10 DIAGNOSIS — M48.062 LUMBAR STENOSIS WITH NEUROGENIC CLAUDICATION: ICD-10-CM

## 2021-09-10 DIAGNOSIS — Z00.00 ENCOUNTER FOR MEDICARE ANNUAL WELLNESS EXAM: Primary | ICD-10-CM

## 2021-09-10 PROCEDURE — 96160 PT-FOCUSED HLTH RISK ASSMT: CPT | Performed by: PHYSICIAN ASSISTANT

## 2021-09-10 PROCEDURE — G0439 PPPS, SUBSEQ VISIT: HCPCS | Performed by: PHYSICIAN ASSISTANT

## 2021-09-10 PROCEDURE — 1160F RVW MEDS BY RX/DR IN RCRD: CPT | Performed by: PHYSICIAN ASSISTANT

## 2021-09-10 PROCEDURE — 1126F AMNT PAIN NOTED NONE PRSNT: CPT | Performed by: PHYSICIAN ASSISTANT

## 2021-09-10 PROCEDURE — 99214 OFFICE O/P EST MOD 30 MIN: CPT | Performed by: PHYSICIAN ASSISTANT

## 2021-09-10 PROCEDURE — 1170F FXNL STATUS ASSESSED: CPT | Performed by: PHYSICIAN ASSISTANT

## 2021-09-10 RX ORDER — LISINOPRIL 5 MG/1
5 TABLET ORAL DAILY
Qty: 90 TABLET | Refills: 3 | Status: SHIPPED | OUTPATIENT
Start: 2021-09-10 | End: 2022-09-04

## 2021-09-10 RX ORDER — ROSUVASTATIN CALCIUM 5 MG/1
5 TABLET, COATED ORAL DAILY
Qty: 90 TABLET | Refills: 3 | Status: SHIPPED | OUTPATIENT
Start: 2021-09-10 | End: 2022-09-04

## 2021-09-10 NOTE — PROGRESS NOTES
The ABCs of the Annual Wellness Visit  Subsequent Medicare Wellness Visit    Chief Complaint   Patient presents with   • Medicare Wellness-subsequent      Subjective    History of Present Illness:  Russell Soto is a 66 y.o. male who presents for a Subsequent Medicare Wellness Visit.    Pt has been monitoring his blood pressure at home and has been ranging from 115-140 systolic.    Pt was reviewing his chart and looked at an ECG from an ER visit from 2020. He was there for vertigo but his ECG showed possible afib. There was never any mention of this and no recommended follow up. He generally feels well and does not have irregular heart rate except occasionally when he lays down at night.     Continues to have back pain, is progressing somewhat. Has been 5 years since his surgery. Does not have the numbness he had prior to his surgery. Not ready to go back to see Neurosurgeon yet.    The following portions of the patient's history were reviewed and   updated as appropriate: allergies, current medications, past family history, past medical history, past social history, past surgical history and problem list.    Compared to one year ago, the patient feels his physical   health is the same.    Compared to one year ago, the patient feels his mental   health is the same.    Recent Hospitalizations:  He was not admitted to the hospital during the last year.       Current Medical Providers:  Patient Care Team:  Sheryl Cevallos PA as PCP - General (Internal Medicine)  Sheryl Cevallos PA as Referring Physician (Internal Medicine)    Outpatient Medications Prior to Visit   Medication Sig Dispense Refill   • lisinopril (PRINIVIL,ZESTRIL) 5 MG tablet Take 1 tablet by mouth Daily. 90 tablet 3   • meclizine (ANTIVERT) 25 MG tablet Take 1 tablet by mouth 4 (Four) Times a Day As Needed for Dizziness. 30 tablet 0   • rosuvastatin (Crestor) 5 MG tablet Take 1 tablet by mouth Daily. 90 tablet 3     No facility-administered  medications prior to visit.       No opioid medication identified on active medication list. I have reviewed chart for other potential  high risk medication/s and harmful drug interactions in the elderly.          Aspirin is not on active medication list.  Aspirin use is not indicated based on review of current medical condition/s. Risk of harm outweighs potential benefits.  .    Patient Active Problem List   Diagnosis   • Sinus bradycardia   • BPH (benign prostatic hypertrophy)   • Anemia   • Palpitations   • Health care maintenance   • Hyperlipidemia   • Acute bilateral low back pain with bilateral sciatica   • Hypertension   • Sepsis (CMS/HCC)   • Spinal stenosis, lumbar region, with neurogenic claudication   • Lumbar stenosis with neurogenic claudication     Advance Care Planning  Advance Directive is not on file.  ACP discussion was held with the patient during this visit. Patient has an advance directive (not in EMR), copy requested.    Review of Systems   Constitutional: Negative for activity change, appetite change, diaphoresis, fatigue and fever.   HENT: Negative for congestion, postnasal drip and rhinorrhea.    Respiratory: Negative for chest tightness, shortness of breath and wheezing.    Cardiovascular: Positive for palpitations. Negative for chest pain.   Gastrointestinal: Negative for abdominal pain, constipation, diarrhea and nausea.   Genitourinary: Negative for dysuria and hematuria.   Musculoskeletal: Positive for back pain.   Skin: Negative.    Neurological: Negative for dizziness, syncope, weakness and light-headedness.   Psychiatric/Behavioral: Negative for dysphoric mood. The patient is not nervous/anxious.         Objective    Vitals:    09/10/21 0815   BP: 156/80   Pulse: 54   SpO2: 97%   Weight: 87.6 kg (193 lb 3.2 oz)   PainSc: 0-No pain     Body mass index is 30.26 kg/m².  BMI has not been calculated during today's encounter.     Does the patient have evidence of cognitive impairment?  No      Physical Exam  Vitals and nursing note reviewed.   Constitutional:       Appearance: He is well-developed.   HENT:      Head: Normocephalic.      Right Ear: Hearing, tympanic membrane, ear canal and external ear normal.      Left Ear: Hearing, tympanic membrane, ear canal and external ear normal.      Nose: Nose normal.   Eyes:      Conjunctiva/sclera: Conjunctivae normal.      Pupils: Pupils are equal, round, and reactive to light.   Cardiovascular:      Rate and Rhythm: Normal rate and regular rhythm.      Heart sounds: Normal heart sounds.   Pulmonary:      Effort: Pulmonary effort is normal.      Breath sounds: Normal breath sounds. No decreased breath sounds, wheezing, rhonchi or rales.   Musculoskeletal:         General: Normal range of motion.      Cervical back: Normal range of motion.   Skin:     General: Skin is warm and dry.   Neurological:      Mental Status: He is alert.   Psychiatric:         Behavior: Behavior normal.       Lab Results   Component Value Date     (H) 09/10/2021    CHLPL 201 (H) 09/10/2021    TRIG 256 (H) 09/10/2021    HDL 56 09/10/2021     (H) 09/10/2021    VLDL 43 (H) 09/10/2021            HEALTH RISK ASSESSMENT    Smoking Status:  Social History     Tobacco Use   Smoking Status Former Smoker   • Packs/day: 1.00   • Years: 30.00   • Pack years: 30.00   • Types: Cigarettes   • Quit date: 1997   • Years since quittin.0   Smokeless Tobacco Never Used     Alcohol Consumption:  Social History     Substance and Sexual Activity   Alcohol Use Yes    Comment: Social     Fall Risk Screen:    STEADI Fall Risk Assessment was completed, and patient is at LOW risk for falls.Assessment completed on:9/10/2021    Depression Screening:  PHQ-2/PHQ-9 Depression Screening 9/10/2021   Little interest or pleasure in doing things 0   Feeling down, depressed, or hopeless 0   Total Score 0       Health Habits and Functional and Cognitive Screening:  Functional & Cognitive Status  9/10/2021   Do you have difficulty preparing food and eating? No   Do you have difficulty bathing yourself, getting dressed or grooming yourself? No   Do you have difficulty using the toilet? No   Do you have difficulty moving around from place to place? No   Do you have trouble with steps or getting out of a bed or a chair? No   Current Diet Well Balanced Diet   Dental Exam Up to date   Eye Exam Not up to date   Exercise (times per week) 7 times per week        Exercise Frequency Comment -   Current Exercises Include Walking   Current Exercise Activities Include -   Do you need help using the phone?  No   Are you deaf or do you have serious difficulty hearing?  No   Do you need help with transportation? No   Do you need help shopping? No   Do you need help preparing meals?  No   Do you need help with housework?  No   Do you need help with laundry? No   Do you need help taking your medications? No   Do you need help managing money? No   Do you ever drive or ride in a car without wearing a seat belt? No   Have you felt unusual stress, anger or loneliness in the last month? No   Who do you live with? Spouse   If you need help, do you have trouble finding someone available to you? No   Have you been bothered in the last four weeks by sexual problems? No   Do you have difficulty concentrating, remembering or making decisions? No       Age-appropriate Screening Schedule:  Refer to the list below for future screening recommendations based on patient's age, sex and/or medical conditions. Orders for these recommended tests are listed in the plan section. The patient has been provided with a written plan.    Health Maintenance   Topic Date Due   • ZOSTER VACCINE (2 of 2) 09/10/2022 (Originally 10/10/2017)   • INFLUENZA VACCINE  10/01/2021   • LIPID PANEL  09/10/2022   • TDAP/TD VACCINES (2 - Td or Tdap) 07/25/2026                ECG 12 Lead    Date/Time: 9/14/2021 7:51 AM  Performed by: Sheryl Cevallos PA  Authorized by:  Sheryl Cevallos PA   Comparison: compared with previous ECG from 3/22/2020  Comparison to previous ECG: NSR currently vs artifact/afib on old ECG  Rhythm: sinus rhythm  Rate: bradycardic  BPM: 51  Conduction: conduction normal  ST Segments: ST segments normal  T Waves: T waves normal  QRS axis: normal    Clinical impression: abnormal EKG            Assessment/Plan   CMS Preventative Services Quick Reference  Risk Factors Identified During Encounter  Immunizations Discussed/Encouraged (specific Immunizations; Shingrix  Obesity/Overweight   The above risks/problems have been discussed with the patient.  Follow up actions/plans if indicated are seen below in the Assessment/Plan Section.  Pertinent information has been shared with the patient in the After Visit Summary.    Diagnoses and all orders for this visit:    1. Encounter for Medicare annual wellness exam (Primary)    2. Other secondary hypertension  Assessment & Plan:  Hypertension is improving with treatment.  Continue current treatment regimen.  Dietary sodium restriction.  Weight loss.  Regular aerobic exercise.  Continue current medications.  Ambulatory blood pressure monitoring.  Blood pressure will be reassessed at the next regular appointment.    Orders:  -     lisinopril (PRINIVIL,ZESTRIL) 5 MG tablet; Take 1 tablet by mouth Daily.  Dispense: 90 tablet; Refill: 3    3. Hyperlipidemia, unspecified hyperlipidemia type  Assessment & Plan:  Lipid abnormalities are improving with treatment.  Pharmacotherapy as ordered. Continue crestor 5 mg daily.  Lipids will be reassessed in 1 year.    Orders:  -     Comprehensive Metabolic Panel; Future  -     Lipid Panel; Future  -     Comprehensive Metabolic Panel; Future  -     Lipid Panel; Future    4. Palpitations  Assessment & Plan:  Reviewed ECG from ER visit 3/22/2020, read as atrial fibrillation. ECG in office wnl. Refer for holter monitor. Further recommendations based on results.      Orders:  -     CBC &  Differential; Future  -     TSH; Future  -     Holter Monitor - 72 Hour Up To 15 Days; Future  -     CBC & Differential; Future  -     TSH; Future  -     ECG 12 Lead    5. Vitamin D deficiency  -     Vitamin D 25 Hydroxy; Future  -     Vitamin D 25 Hydroxy; Future    6. Lumbar stenosis with neurogenic claudication  Assessment & Plan:  Discussed referral back to Neurosurgeon as he recommended. Pt declined but will let me know when he is ready.      Other orders  -     rosuvastatin (Crestor) 5 MG tablet; Take 1 tablet by mouth Daily.  Dispense: 90 tablet; Refill: 3      Follow Up:   Return in about 1 year (around 9/10/2022) for Medicare Wellness.     An After Visit Summary and PPPS were made available to the patient.

## 2021-09-13 NOTE — ASSESSMENT & PLAN NOTE
Reviewed ECG from ER visit 3/22/2020, read as atrial fibrillation. ECG in office wnl. Refer for holter monitor. Further recommendations based on results.

## 2021-09-13 NOTE — ASSESSMENT & PLAN NOTE
Discussed referral back to Neurosurgeon as he recommended. Pt declined but will let me know when he is ready.

## 2021-09-14 PROCEDURE — 93000 ELECTROCARDIOGRAM COMPLETE: CPT | Performed by: PHYSICIAN ASSISTANT

## 2021-09-14 NOTE — ASSESSMENT & PLAN NOTE
Lipid abnormalities are improving with treatment.  Pharmacotherapy as ordered. Continue crestor 5 mg daily.  Lipids will be reassessed in 1 year.

## 2021-10-04 DIAGNOSIS — I47.29 NSVT (NONSUSTAINED VENTRICULAR TACHYCARDIA) (HCC): Primary | ICD-10-CM

## 2021-10-13 ENCOUNTER — OFFICE VISIT (OUTPATIENT)
Dept: CARDIOLOGY | Facility: CLINIC | Age: 66
End: 2021-10-13

## 2021-10-13 VITALS
BODY MASS INDEX: 30.51 KG/M2 | WEIGHT: 194.4 LBS | HEIGHT: 67 IN | DIASTOLIC BLOOD PRESSURE: 96 MMHG | SYSTOLIC BLOOD PRESSURE: 160 MMHG | OXYGEN SATURATION: 96 % | HEART RATE: 83 BPM

## 2021-10-13 DIAGNOSIS — I71.40 AAA (ABDOMINAL AORTIC ANEURYSM) WITHOUT RUPTURE (HCC): ICD-10-CM

## 2021-10-13 DIAGNOSIS — I47.29 NSVT (NONSUSTAINED VENTRICULAR TACHYCARDIA) (HCC): ICD-10-CM

## 2021-10-13 DIAGNOSIS — R09.89 BILATERAL CAROTID BRUITS: Primary | ICD-10-CM

## 2021-10-13 DIAGNOSIS — R06.09 DYSPNEA ON EXERTION: ICD-10-CM

## 2021-10-13 PROCEDURE — 99204 OFFICE O/P NEW MOD 45 MIN: CPT | Performed by: INTERNAL MEDICINE

## 2021-10-13 PROCEDURE — 93000 ELECTROCARDIOGRAM COMPLETE: CPT | Performed by: INTERNAL MEDICINE

## 2021-10-13 NOTE — PROGRESS NOTES
[facility] at [facility]  Consultation H&P  Russell Soto  1955  [phone]  There is no work phone number on file..    VISIT DATE:  10/13/2021    PCP: Sheryl Cevallos PA  [facility] [facility] [address]    CC:  No chief complaint on file.    Previous cardiac studies and procedures:  September 2018 abdominal aortic ultrasound  Mild dilatation identified of the mid abdominal aorta at 3.3  cm. Atherosclerotic disease seen throughout the abdominal aorta and  iliac vessels.    October 2021 2-week ambulatory ECG monitor  · An abnormal monitor study.  · Episodes of NSVT.      ASSESSMENT:   Diagnosis Plan   1. Bilateral carotid bruits  US Carotid Bilateral   2. NSVT (nonsustained ventricular tachycardia) ([facility])     3. AAA (abdominal aortic aneurysm) without rupture (HCC)  US Abdomen Complete   4. Dyspnea on exertion  Adult Stress Echo W/ Cont or Stress Agent if Necessary Per Protocol         PLAN:  Abdominal aortic aneurysm: Small, 3.3 cm.  Associated moderately complex atheromatous disease.  No claudication.  Repeat ultrasound evaluation pending.    Nonsustained ventricular tachycardia: Stress echo pending to assess underlying myocardial structure and function and to screen for ischemia.  We will discuss initiation of beta-blockade at follow-up.    History of Present Illness   66-year-old previous smoker with a history of dyslipidemia and hypertension presenting with episodes of nonsustained ventricular tachycardia noted on ambulatory ECG monitor.  He has intermittent nocturnal palpitations which he feels is a flip-flop sensation when he is at rest.  No presyncope or syncope.  No definitive chest discomfort.  Shortness of breath in a class II pattern.  Has noticed some mild cramping in his calves bilaterally at rest since initiation of rosuvastatin.  Personally reviewed ambulatory ECG monitor and abdominal aortic ultrasound imaging with patient.    PHYSICAL EXAMINATION:  Vitals:    10/13/21  "1053   BP: 160/96   BP Location: Left arm   Patient Position: Sitting   Pulse: 83   SpO2: 96%   Weight: 88.2 kg (194 lb 6.4 oz)   Height: 170.2 cm (67\")     General Appearance:    Alert, cooperative, no distress, appears stated age   Head:    Normocephalic, without obvious abnormality, atraumatic   Eyes:    conjunctiva/corneas clear, EOM's intact, fundi     benign, both eyes   Ears:    Normal TM's and external ear canals, both ears   Nose:   Nares normal, septum midline, mucosa normal, no drainage    or sinus tenderness   Throat:   Lips, mucosa, and tongue normal; teeth and gums normal   Neck:   Supple, symmetrical, trachea midline, no adenopathy;     thyroid:  no enlargement/tenderness/nodules; soft bilateral carotid    bruits   Back:     Symmetric, no curvature, ROM normal, no CVA tenderness   Lungs:     Clear to auscultation bilaterally, respirations unlabored   Chest Wall:    No tenderness or deformity    Heart:    Regular rate and rhythm, S1 and S2 normal, no murmur, rub   or gallop, normal carotid impulse bilaterally without bruit.   Abdomen:     Soft, non-tender, bowel sounds active all four quadrants,     no masses, no organomegaly   Extremities:   Extremities normal, atraumatic, no cyanosis or edema   Pulses:   2+ and symmetric all extremities   Skin:   Skin color, texture, turgor normal, no rashes or lesions   Lymph nodes:   Cervical, supraclavicular, and axillary nodes normal   Neurologic:   normal strength, sensation intact     throughout       Diagnostic Data:    ECG 12 Lead    Date/Time: 10/13/2021 11:13 AM  Performed by: Chacho Delaney III, MD  Authorized by: Chacho Delaney III, MD   Previous ECG: no previous ECG available  Rhythm: sinus rhythm  Other findings: non-specific ST-T wave changes    Clinical impression: abnormal EKG          Lab Results   Component Value Date    CHLPL 201 (H) 09/10/2021    TRIG 256 (H) 09/10/2021    HDL 56 09/10/2021     Lab Results   Component Value Date    GLUCOSE 203 (H) " 2020    BUN 11 09/10/2021    CREATININE 0.72 (L) 09/10/2021     09/10/2021    K 4.7 09/10/2021     09/10/2021    CO2 23 09/10/2021     Lab Results   Component Value Date    HGBA1C 5.1 08/15/2017     Lab Results   Component Value Date    WBC 5.8 09/10/2021    HGB 13.1 09/10/2021    HCT 38.3 09/10/2021     09/10/2021       PROBLEM LIST:  Patient Active Problem List   Diagnosis   • Sinus bradycardia   • BPH (benign prostatic hypertrophy)   • Anemia   • Palpitations   • Health care maintenance   • Hyperlipidemia   • Acute bilateral low back pain with bilateral sciatica   • Hypertension   • Sepsis (HCC)   • Spinal stenosis, lumbar region, with neurogenic claudication   • Lumbar stenosis with neurogenic claudication       PAST MEDICAL HX  Past Medical History:   Diagnosis Date   • Bradycardia     At rest    • Cancer (HCC)     Prostate cancer    • Dizziness    • Hypertension    • Rectal hemorrhage        Allergies  Allergies   Allergen Reactions   • Sulfa Antibiotics Rash       Current Medications    Current Outpatient Medications:   •  lisinopril (PRINIVIL,ZESTRIL) 5 MG tablet, Take 1 tablet by mouth Daily., Disp: 90 tablet, Rfl: 3  •  rosuvastatin (Crestor) 5 MG tablet, Take 1 tablet by mouth Daily., Disp: 90 tablet, Rfl: 3         ROS  ROS    All other body systems reviewed and are negative    SOCIAL HX  Social History     Socioeconomic History   • Marital status:    Tobacco Use   • Smoking status: Former Smoker     Packs/day: 1.00     Years: 30.00     Pack years: 30.00     Types: Cigarettes     Quit date: 1997     Years since quittin.0   • Smokeless tobacco: Never Used   Vaping Use   • Vaping Use: Never used   Substance and Sexual Activity   • Alcohol use: Yes     Comment: Social   • Drug use: No   • Sexual activity: Defer       FAMILY HX  Family History   Problem Relation Age of Onset   • Diabetes Mother              Chacho Delaney III, MD, FACC

## 2021-10-18 DIAGNOSIS — R09.89 BILATERAL CAROTID BRUITS: Primary | ICD-10-CM

## 2021-10-22 ENCOUNTER — HOSPITAL ENCOUNTER (OUTPATIENT)
Dept: CARDIOLOGY | Facility: HOSPITAL | Age: 66
Discharge: HOME | End: 2021-10-22

## 2021-10-22 VITALS
HEIGHT: 67 IN | SYSTOLIC BLOOD PRESSURE: 144 MMHG | BODY MASS INDEX: 30.45 KG/M2 | HEART RATE: 97 BPM | WEIGHT: 194 LBS | DIASTOLIC BLOOD PRESSURE: 92 MMHG

## 2021-10-22 VITALS — HEIGHT: 67 IN | WEIGHT: 194 LBS | BODY MASS INDEX: 30.45 KG/M2

## 2021-10-22 DIAGNOSIS — R06.09 DYSPNEA ON EXERTION: ICD-10-CM

## 2021-10-22 DIAGNOSIS — R09.89 BILATERAL CAROTID BRUITS: ICD-10-CM

## 2021-10-22 PROCEDURE — 93880 EXTRACRANIAL BILAT STUDY: CPT

## 2021-10-22 PROCEDURE — 25010000002 SULFUR HEXAFLUORIDE MICROSPH 60.7-25 MG RECONSTITUTED SUSPENSION: Performed by: INTERNAL MEDICINE

## 2021-10-22 PROCEDURE — 93017 CV STRESS TEST TRACING ONLY: CPT

## 2021-10-22 PROCEDURE — 93325 DOPPLER ECHO COLOR FLOW MAPG: CPT | Performed by: INTERNAL MEDICINE

## 2021-10-22 PROCEDURE — 93352 ADMIN ECG CONTRAST AGENT: CPT | Performed by: INTERNAL MEDICINE

## 2021-10-22 PROCEDURE — 93320 DOPPLER ECHO COMPLETE: CPT

## 2021-10-22 PROCEDURE — 93880 EXTRACRANIAL BILAT STUDY: CPT | Performed by: INTERNAL MEDICINE

## 2021-10-22 PROCEDURE — 93018 CV STRESS TEST I&R ONLY: CPT | Performed by: INTERNAL MEDICINE

## 2021-10-22 PROCEDURE — 93320 DOPPLER ECHO COMPLETE: CPT | Performed by: INTERNAL MEDICINE

## 2021-10-22 PROCEDURE — 93325 DOPPLER ECHO COLOR FLOW MAPG: CPT

## 2021-10-22 PROCEDURE — 93350 STRESS TTE ONLY: CPT

## 2021-10-22 PROCEDURE — 93350 STRESS TTE ONLY: CPT | Performed by: INTERNAL MEDICINE

## 2021-10-22 RX ADMIN — SULFUR HEXAFLUORIDE 5 ML: KIT at 16:21

## 2021-10-25 ENCOUNTER — TELEPHONE (OUTPATIENT)
Dept: CARDIOLOGY | Facility: CLINIC | Age: 66
End: 2021-10-25

## 2021-10-25 ENCOUNTER — HOSPITAL ENCOUNTER (OUTPATIENT)
Dept: ULTRASOUND IMAGING | Facility: HOSPITAL | Age: 66
Discharge: HOME | End: 2021-10-25
Admitting: INTERNAL MEDICINE

## 2021-10-25 DIAGNOSIS — I71.40 AAA (ABDOMINAL AORTIC ANEURYSM) WITHOUT RUPTURE (HCC): ICD-10-CM

## 2021-10-25 LAB
BH CV ECHO MEAS - AO MAX PG (FULL): 1.9 MMHG
BH CV ECHO MEAS - AO MAX PG: 5 MMHG
BH CV ECHO MEAS - AO MEAN PG (FULL): 0.95 MMHG
BH CV ECHO MEAS - AO MEAN PG: 2.5 MMHG
BH CV ECHO MEAS - AO ROOT AREA (BSA CORRECTED): 1.7
BH CV ECHO MEAS - AO ROOT AREA: 9.4 CM^2
BH CV ECHO MEAS - AO ROOT DIAM: 3.5 CM
BH CV ECHO MEAS - AO V2 MAX: 111.8 CM/SEC
BH CV ECHO MEAS - AO V2 MEAN: 70.5 CM/SEC
BH CV ECHO MEAS - AO V2 VTI: 22.9 CM
BH CV ECHO MEAS - AVA(I,A): 2.9 CM^2
BH CV ECHO MEAS - AVA(I,D): 2.9 CM^2
BH CV ECHO MEAS - AVA(V,A): 2.7 CM^2
BH CV ECHO MEAS - AVA(V,D): 2.7 CM^2
BH CV ECHO MEAS - BSA(HAYCOCK): 2.1 M^2
BH CV ECHO MEAS - BSA: 2 M^2
BH CV ECHO MEAS - BZI_BMI: 30.4 KILOGRAMS/M^2
BH CV ECHO MEAS - BZI_METRIC_HEIGHT: 170.2 CM
BH CV ECHO MEAS - BZI_METRIC_WEIGHT: 88 KG
BH CV ECHO MEAS - EDV(CUBED): 196.1 ML
BH CV ECHO MEAS - EDV(MOD-SP2): 85.9 ML
BH CV ECHO MEAS - EDV(MOD-SP4): 85.1 ML
BH CV ECHO MEAS - EDV(TEICH): 167.2 ML
BH CV ECHO MEAS - EF(CUBED): 67.2 %
BH CV ECHO MEAS - EF(MOD-BP): 64.6 %
BH CV ECHO MEAS - EF(MOD-SP2): 64.1 %
BH CV ECHO MEAS - EF(MOD-SP4): 65 %
BH CV ECHO MEAS - EF(TEICH): 57.9 %
BH CV ECHO MEAS - ESV(CUBED): 64.3 ML
BH CV ECHO MEAS - ESV(MOD-SP2): 30.8 ML
BH CV ECHO MEAS - ESV(MOD-SP4): 29.8 ML
BH CV ECHO MEAS - ESV(TEICH): 70.3 ML
BH CV ECHO MEAS - FS: 31 %
BH CV ECHO MEAS - IVS/LVPW: 0.94
BH CV ECHO MEAS - IVSD: 0.69 CM
BH CV ECHO MEAS - LA DIMENSION: 4 CM
BH CV ECHO MEAS - LA/AO: 1.2
BH CV ECHO MEAS - LAD MAJOR: 5.8 CM
BH CV ECHO MEAS - LAT PEAK E' VEL: 5.7 CM/SEC
BH CV ECHO MEAS - LATERAL E/E' RATIO: 10.6
BH CV ECHO MEAS - LV DIASTOLIC VOL/BSA (35-75): 42.6 ML/M^2
BH CV ECHO MEAS - LV IVRT: 0.11 SEC
BH CV ECHO MEAS - LV MASS(C)D: 151.9 GRAMS
BH CV ECHO MEAS - LV MASS(C)DI: 76.1 GRAMS/M^2
BH CV ECHO MEAS - LV MAX PG: 3.1 MMHG
BH CV ECHO MEAS - LV MEAN PG: 1.5 MMHG
BH CV ECHO MEAS - LV SYSTOLIC VOL/BSA (12-30): 14.9 ML/M^2
BH CV ECHO MEAS - LV V1 MAX: 87.8 CM/SEC
BH CV ECHO MEAS - LV V1 MEAN: 56 CM/SEC
BH CV ECHO MEAS - LV V1 VTI: 19.4 CM
BH CV ECHO MEAS - LVIDD: 4.8 CM
BH CV ECHO MEAS - LVIDS: 4 CM
BH CV ECHO MEAS - LVLD AP2: 8.3 CM
BH CV ECHO MEAS - LVLD AP4: 8.1 CM
BH CV ECHO MEAS - LVLS AP2: 6.7 CM
BH CV ECHO MEAS - LVLS AP4: 6.8 CM
BH CV ECHO MEAS - LVOT AREA (M): 3.5 CM^2
BH CV ECHO MEAS - LVOT AREA: 3.4 CM^2
BH CV ECHO MEAS - LVOT DIAM: 2.1 CM
BH CV ECHO MEAS - LVPWD: 0.73 CM
BH CV ECHO MEAS - MED PEAK E' VEL: 7.9 CM/SEC
BH CV ECHO MEAS - MEDIAL E/E' RATIO: 7.6
BH CV ECHO MEAS - MV A MAX VEL: 81.4 CM/SEC
BH CV ECHO MEAS - MV DEC SLOPE: 199.3 CM/SEC^2
BH CV ECHO MEAS - MV DEC TIME: 0.3 SEC
BH CV ECHO MEAS - MV E MAX VEL: 60 CM/SEC
BH CV ECHO MEAS - MV E/A: 0.74
BH CV ECHO MEAS - PA ACC TIME: 0.08 SEC
BH CV ECHO MEAS - PA PR(ACCEL): 42.2 MMHG
BH CV ECHO MEAS - SI(AO): 107.9 ML/M^2
BH CV ECHO MEAS - SI(CUBED): 66 ML/M^2
BH CV ECHO MEAS - SI(LVOT): 33.2 ML/M^2
BH CV ECHO MEAS - SI(MOD-SP2): 27.6 ML/M^2
BH CV ECHO MEAS - SI(MOD-SP4): 27.7 ML/M^2
BH CV ECHO MEAS - SI(TEICH): 48.5 ML/M^2
BH CV ECHO MEAS - SV(AO): 215.4 ML
BH CV ECHO MEAS - SV(CUBED): 131.7 ML
BH CV ECHO MEAS - SV(LVOT): 66.4 ML
BH CV ECHO MEAS - SV(MOD-SP2): 55.1 ML
BH CV ECHO MEAS - SV(MOD-SP4): 55.3 ML
BH CV ECHO MEAS - SV(TEICH): 96.9 ML
BH CV ECHO MEAS - TAPSE (>1.6): 1.9 CM
BH CV ECHO MEASUREMENTS AVERAGE E/E' RATIO: 8.82
BH CV STRESS BP STAGE 1: NORMAL
BH CV STRESS BP STAGE 2: NORMAL
BH CV STRESS DURATION MIN STAGE 1: 3
BH CV STRESS DURATION MIN STAGE 2: 3
BH CV STRESS DURATION SEC STAGE 1: 0
BH CV STRESS DURATION SEC STAGE 2: 0
BH CV STRESS GRADE STAGE 1: 10
BH CV STRESS GRADE STAGE 2: 12
BH CV STRESS HR STAGE 1: 131
BH CV STRESS HR STAGE 2: 150
BH CV STRESS METS STAGE 1: 5
BH CV STRESS METS STAGE 2: 7.5
BH CV STRESS O2 STAGE 1: 97
BH CV STRESS O2 STAGE 2: 98
BH CV STRESS PROTOCOL 1: NORMAL
BH CV STRESS RECOVERY BP: NORMAL MMHG
BH CV STRESS RECOVERY HR: 97 BPM
BH CV STRESS RECOVERY O2: 97 %
BH CV STRESS SPEED STAGE 1: 1.7
BH CV STRESS SPEED STAGE 2: 2.5
BH CV STRESS STAGE 1: 1
BH CV STRESS STAGE 2: 2
BH CV VAS BP RIGHT ARM: NORMAL MMHG
BH CV XLRA - RV BASE: 3.2 CM
BH CV XLRA - RV LENGTH: 7.2 CM
BH CV XLRA - RV MID: 2.9 CM
BH CV XLRA - TDI S': 15 CM/SEC
BH CV XLRA MEAS LEFT CAROTID BULB EDV: 19.8 CM/SEC
BH CV XLRA MEAS LEFT CAROTID BULB PSV: 74.1 CM/SEC
BH CV XLRA MEAS LEFT DIST CCA EDV: 26 CM/SEC
BH CV XLRA MEAS LEFT DIST CCA PSV: 100.2 CM/SEC
BH CV XLRA MEAS LEFT DIST ICA EDV: 35.7 CM/SEC
BH CV XLRA MEAS LEFT DIST ICA PSV: 95.4 CM/SEC
BH CV XLRA MEAS LEFT ICA/CCA RATIO: 0.81
BH CV XLRA MEAS LEFT MID CCA EDV: 31 CM/SEC
BH CV XLRA MEAS LEFT MID CCA PSV: 129 CM/SEC
BH CV XLRA MEAS LEFT MID ICA EDV: 28.2 CM/SEC
BH CV XLRA MEAS LEFT MID ICA PSV: 70.2 CM/SEC
BH CV XLRA MEAS LEFT PROX CCA EDV: 27.1 CM/SEC
BH CV XLRA MEAS LEFT PROX CCA PSV: 111 CM/SEC
BH CV XLRA MEAS LEFT PROX ECA EDV: 16.4 CM/SEC
BH CV XLRA MEAS LEFT PROX ECA PSV: 108 CM/SEC
BH CV XLRA MEAS LEFT PROX ICA EDV: 20.7 CM/SEC
BH CV XLRA MEAS LEFT PROX ICA PSV: 105 CM/SEC
BH CV XLRA MEAS LEFT PROX SCLA PSV: 184 CM/SEC
BH CV XLRA MEAS LEFT VERTEBRAL A EDV: 26.5 CM/SEC
BH CV XLRA MEAS LEFT VERTEBRAL A PSV: 76.3 CM/SEC
BH CV XLRA MEAS RIGHT DIST CCA EDV: 26.6 CM/SEC
BH CV XLRA MEAS RIGHT DIST CCA PSV: 114.2 CM/SEC
BH CV XLRA MEAS RIGHT DIST ICA EDV: 38.5 CM/SEC
BH CV XLRA MEAS RIGHT DIST ICA PSV: 93.8 CM/SEC
BH CV XLRA MEAS RIGHT ICA/CCA RATIO: 0.8
BH CV XLRA MEAS RIGHT MID CCA EDV: 21 CM/SEC
BH CV XLRA MEAS RIGHT MID CCA PSV: 89.7 CM/SEC
BH CV XLRA MEAS RIGHT MID ICA EDV: 27.5 CM/SEC
BH CV XLRA MEAS RIGHT MID ICA PSV: 71.8 CM/SEC
BH CV XLRA MEAS RIGHT PROX CCA EDV: 23.1 CM/SEC
BH CV XLRA MEAS RIGHT PROX CCA PSV: 84.8 CM/SEC
BH CV XLRA MEAS RIGHT PROX ECA EDV: 14.9 CM/SEC
BH CV XLRA MEAS RIGHT PROX ECA PSV: 84.5 CM/SEC
BH CV XLRA MEAS RIGHT PROX ICA EDV: 25.1 CM/SEC
BH CV XLRA MEAS RIGHT PROX ICA PSV: 63.4 CM/SEC
BH CV XLRA MEAS RIGHT PROX SCLA PSV: 232 CM/SEC
BH CV XLRA MEAS RIGHT VERTEBRAL A EDV: 13 CM/SEC
BH CV XLRA MEAS RIGHT VERTEBRAL A PSV: 51.6 CM/SEC
MAXIMAL PREDICTED HEART RATE: 152 BPM
MAXIMAL PREDICTED HEART RATE: 152 BPM
PERCENT MAX PREDICTED HR: 101.97 %
RIGHT ARM BP: NORMAL MMHG
STRESS BASELINE BP: NORMAL MMHG
STRESS BASELINE HR: 87 BPM
STRESS O2 SAT REST: 98 %
STRESS PERCENT HR: 120 %
STRESS POST ESTIMATED WORKLOAD: 7.2 METS
STRESS POST EXERCISE DUR MIN: 6 MIN
STRESS POST EXERCISE DUR SEC: 0 SEC
STRESS POST O2 SAT PEAK: 97 %
STRESS POST PEAK BP: NORMAL MMHG
STRESS POST PEAK HR: 155 BPM
STRESS TARGET HR: 129 BPM
STRESS TARGET HR: 129 BPM

## 2021-10-25 PROCEDURE — 76775 US EXAM ABDO BACK WALL LIM: CPT

## 2021-10-25 NOTE — TELEPHONE ENCOUNTER
----- Message from Chacho Delaney III, MD sent at 10/25/2021  2:35 PM EDT -----  Normal stress test and no significant blockage in his carotid arteries.

## 2021-12-01 ENCOUNTER — OFFICE VISIT (OUTPATIENT)
Dept: CARDIOLOGY | Facility: CLINIC | Age: 66
End: 2021-12-01

## 2021-12-01 VITALS
HEART RATE: 95 BPM | HEIGHT: 67 IN | DIASTOLIC BLOOD PRESSURE: 80 MMHG | OXYGEN SATURATION: 97 % | WEIGHT: 196 LBS | BODY MASS INDEX: 30.76 KG/M2 | SYSTOLIC BLOOD PRESSURE: 144 MMHG

## 2021-12-01 DIAGNOSIS — I15.8 OTHER SECONDARY HYPERTENSION: ICD-10-CM

## 2021-12-01 DIAGNOSIS — E78.5 HYPERLIPIDEMIA, UNSPECIFIED HYPERLIPIDEMIA TYPE: Primary | ICD-10-CM

## 2021-12-01 PROCEDURE — 99213 OFFICE O/P EST LOW 20 MIN: CPT | Performed by: INTERNAL MEDICINE

## 2021-12-01 RX ORDER — AMLODIPINE BESYLATE 2.5 MG/1
2.5 TABLET ORAL DAILY
Qty: 90 TABLET | Refills: 1 | Status: SHIPPED | OUTPATIENT
Start: 2021-12-01 | End: 2022-06-01 | Stop reason: SDUPTHER

## 2021-12-01 NOTE — PROGRESS NOTES
[facility] at [facility]  Office visit  Russell Soto  1955  [phone]  There is no work phone number on file.    VISIT DATE:  12/1/2021    PCP: Sheryl Cevallos PA  [address] [facility] [address]    CC:  Chief Complaint   Patient presents with   • bilateral carotid bruits       Previous cardiac studies and procedures:  September 2018 abdominal aortic ultrasound  Mild dilatation identified of the mid abdominal aorta at 3.3  cm. Atherosclerotic disease seen throughout the abdominal aorta and  iliac vessels.     October 2021   2-week ambulatory ECG monitor  · An abnormal monitor study.  · Episodes of NSVT.  Stress echocardiogram  · Pt. denied any chest discomfort/pain, or any other symptoms during exercise.  · Expected exercise duration = 7:30 Actual = 6:00.  · No significant exercise-induced repolarization abnormalities.  · Left ventricular ejection fraction appears to be 61 - 65%. Left ventricular systolic function is normal.  · Left ventricular diastolic function is consistent with (grade I) impaired relaxation.  · Normal stress echo with no significant echocardiographic evidence for myocardial ischemia.  Bilateral carotid duplex: Minimal atherosclerosis bilaterally  AAA screen: Negative.    ASSESSMENT:   Diagnosis Plan   1. Hyperlipidemia, unspecified hyperlipidemia type     2. Other secondary hypertension         PLAN:  Abdominal aortic aneurysm: Follow-up ultrasound negative for dilation.  Did reveal moderate to severely complex calcific atherosclerosis.  Currently denies symptoms concerning for claudication.    Nonsustained ventricular tachycardia: Normal EF, no ischemia.  Continue to follow clinically.    Hypertension: Goal less than 130/80 mmHg.  Continue lisinopril 5 mg p.o. daily, avoiding up titration due to episode of mild hyperkalemia.  Adding amlodipine 2.5 mg p.o. nightly.    Hyperlipidemia: Goal LDL less than 100, ideally less than 70.  Continue rosuvastatin 5 mg p.o.  "daily, developed muscle cramps at higher doses.    Subjective  Interval assessment: No change in baseline functional capacity.  Systolic blood pressures running in the 130s.  Denies claudication.  He is compliant with medical therapy.    Initial evaluation: 66-year-old previous smoker with a history of dyslipidemia and hypertension presenting with episodes of nonsustained ventricular tachycardia noted on ambulatory ECG monitor.  He has intermittent nocturnal palpitations which he feels is a flip-flop sensation when he is at rest.  No presyncope or syncope.  No definitive chest discomfort.  Shortness of breath in a class II pattern.  Has noticed some mild cramping in his calves bilaterally at rest since initiation of rosuvastatin.  Personally reviewed ambulatory ECG monitor and abdominal aortic ultrasound imaging with patient.    PHYSICAL EXAMINATION:  Vitals:    12/01/21 1339   BP: 144/80   BP Location: Left arm   Patient Position: Sitting   Pulse: 95   SpO2: 97%   Weight: 88.9 kg (196 lb)   Height: 170.2 cm (67\")     General Appearance:    Alert, cooperative, no distress, appears stated age   Head:    Normocephalic, without obvious abnormality, atraumatic   Eyes:    conjunctiva/corneas clear   Nose:   Nares normal, septum midline, mucosa normal, no drainage   Throat:   Lips, teeth and gums normal   Neck:   Supple, symmetrical, trachea midline, no carotid    bruit or JVD   Lungs:     Clear to auscultation bilaterally, respirations unlabored   Chest Wall:    No tenderness or deformity    Heart:    Regular rate and rhythm, S1 and S2 normal, no murmur, rub   or gallop, normal carotid impulse bilaterally without bruit.   Abdomen:     Soft, non-tender   Extremities:   Extremities normal, atraumatic, no cyanosis or edema   Pulses:   2+ and symmetric all extremities   Skin:   Skin color, texture, turgor normal, no rashes or lesions       Diagnostic Data:  Procedures  Lab Results   Component Value Date    CHLPL 201 (H) " 09/10/2021    TRIG 256 (H) 09/10/2021    HDL 56 09/10/2021     Lab Results   Component Value Date    GLUCOSE 113 (H) 09/10/2021    BUN 11 09/10/2021    CREATININE 0.72 (L) 09/10/2021     09/10/2021    K 4.7 09/10/2021     09/10/2021    CO2 23 09/10/2021     Lab Results   Component Value Date    HGBA1C 5.1 08/15/2017     Lab Results   Component Value Date    WBC 5.8 09/10/2021    HGB 13.1 09/10/2021    HCT 38.3 09/10/2021     09/10/2021       Allergies  Allergies   Allergen Reactions   • Sulfa Antibiotics Rash       Current Medications    Current Outpatient Medications:   •  lisinopril (PRINIVIL,ZESTRIL) 5 MG tablet, Take 1 tablet by mouth Daily., Disp: 90 tablet, Rfl: 3  •  rosuvastatin (Crestor) 5 MG tablet, Take 1 tablet by mouth Daily., Disp: 90 tablet, Rfl: 3          ROS  ROS      SOCIAL HX  Social History     Socioeconomic History   • Marital status:    Tobacco Use   • Smoking status: Former Smoker     Packs/day: 1.00     Years: 30.00     Pack years: 30.00     Types: Cigarettes     Quit date: 1997     Years since quittin.2   • Smokeless tobacco: Never Used   Vaping Use   • Vaping Use: Never used   Substance and Sexual Activity   • Alcohol use: Yes     Comment: Social   • Drug use: No   • Sexual activity: Defer       FAMILY HX  Family History   Problem Relation Age of Onset   • Diabetes Mother              Chacho Delaney III, MD, FACC

## 2022-06-01 ENCOUNTER — OFFICE VISIT (OUTPATIENT)
Dept: CARDIOLOGY | Facility: CLINIC | Age: 67
End: 2022-06-01

## 2022-06-01 VITALS
SYSTOLIC BLOOD PRESSURE: 140 MMHG | WEIGHT: 193 LBS | DIASTOLIC BLOOD PRESSURE: 74 MMHG | HEART RATE: 101 BPM | OXYGEN SATURATION: 96 % | HEIGHT: 67 IN | BODY MASS INDEX: 30.29 KG/M2

## 2022-06-01 DIAGNOSIS — I15.8 OTHER SECONDARY HYPERTENSION: Primary | ICD-10-CM

## 2022-06-01 DIAGNOSIS — E78.2 MIXED HYPERLIPIDEMIA: ICD-10-CM

## 2022-06-01 DIAGNOSIS — I73.9 PVD (PERIPHERAL VASCULAR DISEASE) WITH CLAUDICATION: ICD-10-CM

## 2022-06-01 PROCEDURE — 99214 OFFICE O/P EST MOD 30 MIN: CPT | Performed by: INTERNAL MEDICINE

## 2022-06-01 RX ORDER — AMLODIPINE BESYLATE 2.5 MG/1
2.5 TABLET ORAL DAILY
Qty: 90 TABLET | Refills: 3 | Status: SHIPPED | OUTPATIENT
Start: 2022-06-01 | End: 2022-09-16 | Stop reason: SDUPTHER

## 2022-06-01 NOTE — PROGRESS NOTES
[facility] at [facility]  Office visit  Russell Soto  1955  [phone]  There is no work phone number on file.    VISIT DATE:  6/1/2022    PCP: Sheryl Cevallos PA  [id number] [facility] [id number]    CC:  Chief Complaint   Patient presents with   • Hyperlipidemia       Previous cardiac studies and procedures:  September 2018 abdominal aortic ultrasound  Mild dilatation identified of the mid abdominal aorta at 3.3  cm. Atherosclerotic disease seen throughout the abdominal aorta and  iliac vessels.     October 2021   2-week ambulatory ECG monitor  · An abnormal monitor study.  · Episodes of NSVT.  Stress echocardiogram  · Pt. denied any chest discomfort/pain, or any other symptoms during exercise.  · Expected exercise duration = 7:30 Actual = 6:00.  · No significant exercise-induced repolarization abnormalities.  · Left ventricular ejection fraction appears to be 61 - 65%. Left ventricular systolic function is normal.  · Left ventricular diastolic function is consistent with (grade I) impaired relaxation.  · Normal stress echo with no significant echocardiographic evidence for myocardial ischemia.  Bilateral carotid duplex: Minimal atherosclerosis bilaterally  AAA screen: Negative.    ASSESSMENT:   Diagnosis Plan   1. Other secondary hypertension     2. Mixed hyperlipidemia         PLAN:  Complex bulky atheroma descending aorta: ABIs pending.  Continue afterload reduction and statin.  Previous experience significant bruising on antiplatelet therapy, low-dose aspirin.    Nonsustained ventricular tachycardia: Normal EF, no ischemia.  Continue to follow clinically.    Hypertension: Goal less than 130/80 mmHg.  Continue lisinopril 5 mg p.o. daily, avoiding up titration due to episode of mild hyperkalemia.  Continue amlodipine 2.5 mg p.o. nightly.    Hyperlipidemia: Goal LDL less than 100, ideally less than 70.  Continue rosuvastatin 5 mg p.o. daily, developed muscle cramps at higher  "doses.    Subjective  Interval assessment: No change in baseline functional capacity.  Systolic blood pressures running in the 130s.  Denies claudication.  He is compliant with medical therapy.  Walking half mile each day, does experience some discomfort in his glutes and upper thighs.    Initial evaluation: 66-year-old previous smoker with a history of dyslipidemia and hypertension presenting with episodes of nonsustained ventricular tachycardia noted on ambulatory ECG monitor.  He has intermittent nocturnal palpitations which he feels is a flip-flop sensation when he is at rest.  No presyncope or syncope.  No definitive chest discomfort.  Shortness of breath in a class II pattern.  Has noticed some mild cramping in his calves bilaterally at rest since initiation of rosuvastatin.  Personally reviewed ambulatory ECG monitor and abdominal aortic ultrasound imaging with patient.    PHYSICAL EXAMINATION:  Vitals:    06/01/22 1453   BP: 140/74   BP Location: Right arm   Patient Position: Sitting   Pulse: 101   SpO2: 96%   Weight: 87.5 kg (193 lb)   Height: 170.2 cm (67\")     General Appearance:    Alert, cooperative, no distress, appears stated age   Head:    Normocephalic, without obvious abnormality, atraumatic   Eyes:    conjunctiva/corneas clear   Nose:   Nares normal, septum midline, mucosa normal, no drainage   Throat:   Lips, teeth and gums normal   Neck:   Supple, symmetrical, trachea midline, no carotid    bruit or JVD   Lungs:     Clear to auscultation bilaterally, respirations unlabored   Chest Wall:    No tenderness or deformity    Heart:    Regular rate and rhythm, S1 and S2 normal, no murmur, rub   or gallop, normal carotid impulse bilaterally without bruit.   Abdomen:     Soft, non-tender   Extremities:   Extremities normal, atraumatic, no cyanosis or edema   Pulses:   2+ and symmetric all extremities   Skin:   Skin color, texture, turgor normal, no rashes or lesions       Diagnostic " Data:  Procedures  Lab Results   Component Value Date    CHLPL 201 (H) 09/10/2021    TRIG 256 (H) 09/10/2021    HDL 56 09/10/2021     Lab Results   Component Value Date    GLUCOSE 113 (H) 09/10/2021    BUN 11 09/10/2021    CREATININE 0.72 (L) 09/10/2021     09/10/2021    K 4.7 09/10/2021     09/10/2021    CO2 23 09/10/2021     Lab Results   Component Value Date    HGBA1C 5.1 08/15/2017     Lab Results   Component Value Date    WBC 5.8 09/10/2021    HGB 13.1 09/10/2021    HCT 38.3 09/10/2021     09/10/2021       Allergies  Allergies   Allergen Reactions   • Sulfa Antibiotics Rash       Current Medications    Current Outpatient Medications:   •  amLODIPine (NORVASC) 2.5 MG tablet, Take 1 tablet by mouth Daily., Disp: 90 tablet, Rfl: 1  •  lisinopril (PRINIVIL,ZESTRIL) 5 MG tablet, Take 1 tablet by mouth Daily., Disp: 90 tablet, Rfl: 3  •  rosuvastatin (Crestor) 5 MG tablet, Take 1 tablet by mouth Daily., Disp: 90 tablet, Rfl: 3          ROS  ROS      SOCIAL HX  Social History     Socioeconomic History   • Marital status:    Tobacco Use   • Smoking status: Former Smoker     Packs/day: 1.00     Years: 30.00     Pack years: 30.00     Types: Cigarettes     Quit date: 1997     Years since quittin.7   • Smokeless tobacco: Never Used   Vaping Use   • Vaping Use: Never used   Substance and Sexual Activity   • Alcohol use: Yes     Comment: Social   • Drug use: No   • Sexual activity: Defer       FAMILY HX  Family History   Problem Relation Age of Onset   • Diabetes Mother              Chacho Delaney III, MD, [facility]

## 2022-06-21 ENCOUNTER — HOSPITAL ENCOUNTER (OUTPATIENT)
Dept: CARDIOLOGY | Facility: HOSPITAL | Age: 67
Discharge: HOME | End: 2022-06-21
Admitting: INTERNAL MEDICINE

## 2022-06-21 DIAGNOSIS — I73.9 PVD (PERIPHERAL VASCULAR DISEASE) WITH CLAUDICATION: ICD-10-CM

## 2022-06-21 PROCEDURE — 93923 UPR/LXTR ART STDY 3+ LVLS: CPT

## 2022-06-21 PROCEDURE — 93923 UPR/LXTR ART STDY 3+ LVLS: CPT | Performed by: INTERNAL MEDICINE

## 2022-06-22 ENCOUNTER — TELEPHONE (OUTPATIENT)
Dept: CARDIOLOGY | Facility: CLINIC | Age: 67
End: 2022-06-22

## 2022-06-22 LAB
BH CV LOWER ARTERIAL LEFT ABI RATIO: 1.04
BH CV LOWER ARTERIAL LEFT DORSALIS PEDIS SYS MAX: 144
BH CV LOWER ARTERIAL LEFT GREAT TOE SYS MAX: 94
BH CV LOWER ARTERIAL LEFT POST TIBIAL SYS MAX: 140
BH CV LOWER ARTERIAL LEFT TBI RATIO: 0.68
BH CV LOWER ARTERIAL RIGHT ABI RATIO: 1
BH CV LOWER ARTERIAL RIGHT DORSALIS PEDIS SYS MAX: 138
BH CV LOWER ARTERIAL RIGHT GREAT TOE SYS MAX: 96
BH CV LOWER ARTERIAL RIGHT POST TIBIAL SYS MAX: 134
BH CV LOWER ARTERIAL RIGHT TBI RATIO: 0.7
MAXIMAL PREDICTED HEART RATE: 151 BPM
STRESS TARGET HR: 128 BPM
UPPER ARTERIAL LEFT ARM BRACHIAL SYS MAX: 138 MMHG
UPPER ARTERIAL RIGHT ARM BRACHIAL SYS MAX: 136 MMHG

## 2022-06-22 NOTE — TELEPHONE ENCOUNTER
----- Message from Chacho Delaney III, MD sent at 6/22/2022  8:58 AM EDT -----    ----- Message -----  From: Chacho Delaney III, MD  Sent: 6/22/2022   8:51 AM EDT  To: Chacho Delaney III, MD    Blood flow to your legs looks normal.

## 2022-09-03 DIAGNOSIS — I15.8 OTHER SECONDARY HYPERTENSION: ICD-10-CM

## 2022-09-04 RX ORDER — ROSUVASTATIN CALCIUM 5 MG/1
TABLET, COATED ORAL
Qty: 90 TABLET | Refills: 0 | Status: SHIPPED | OUTPATIENT
Start: 2022-09-04 | End: 2022-09-16 | Stop reason: SDUPTHER

## 2022-09-04 RX ORDER — LISINOPRIL 5 MG/1
TABLET ORAL
Qty: 90 TABLET | Refills: 0 | Status: SHIPPED | OUTPATIENT
Start: 2022-09-04 | End: 2022-09-16 | Stop reason: SDUPTHER

## 2022-09-16 ENCOUNTER — LAB (OUTPATIENT)
Dept: LAB | Facility: HOSPITAL | Age: 67
End: 2022-09-16

## 2022-09-16 ENCOUNTER — OFFICE VISIT (OUTPATIENT)
Dept: INTERNAL MEDICINE | Facility: CLINIC | Age: 67
End: 2022-09-16

## 2022-09-16 VITALS
TEMPERATURE: 97.7 F | BODY MASS INDEX: 29.48 KG/M2 | WEIGHT: 188.2 LBS | OXYGEN SATURATION: 95 % | SYSTOLIC BLOOD PRESSURE: 152 MMHG | DIASTOLIC BLOOD PRESSURE: 84 MMHG | HEART RATE: 60 BPM

## 2022-09-16 DIAGNOSIS — D64.9 ANEMIA, UNSPECIFIED TYPE: ICD-10-CM

## 2022-09-16 DIAGNOSIS — I15.8 OTHER SECONDARY HYPERTENSION: ICD-10-CM

## 2022-09-16 DIAGNOSIS — E78.2 MIXED HYPERLIPIDEMIA: ICD-10-CM

## 2022-09-16 DIAGNOSIS — Z00.00 ENCOUNTER FOR MEDICARE ANNUAL WELLNESS EXAM: Primary | ICD-10-CM

## 2022-09-16 DIAGNOSIS — H93.8X3 EAR FULLNESS, BILATERAL: ICD-10-CM

## 2022-09-16 LAB
ALBUMIN SERPL-MCNC: 4.7 G/DL (ref 3.5–5.2)
ALBUMIN/GLOB SERPL: 2.4 G/DL
ALP SERPL-CCNC: 60 U/L (ref 39–117)
ALT SERPL W P-5'-P-CCNC: 16 U/L (ref 1–41)
ANION GAP SERPL CALCULATED.3IONS-SCNC: 11 MMOL/L (ref 5–15)
AST SERPL-CCNC: 22 U/L (ref 1–40)
BASOPHILS # BLD AUTO: 0.07 10*3/MM3 (ref 0–0.2)
BASOPHILS NFR BLD AUTO: 1.3 % (ref 0–1.5)
BILIRUB SERPL-MCNC: 0.3 MG/DL (ref 0–1.2)
BUN SERPL-MCNC: 10 MG/DL (ref 8–23)
BUN/CREAT SERPL: 13.9 (ref 7–25)
CALCIUM SPEC-SCNC: 9.5 MG/DL (ref 8.6–10.5)
CHLORIDE SERPL-SCNC: 103 MMOL/L (ref 98–107)
CHOLEST SERPL-MCNC: 188 MG/DL (ref 0–200)
CO2 SERPL-SCNC: 26 MMOL/L (ref 22–29)
CREAT SERPL-MCNC: 0.72 MG/DL (ref 0.76–1.27)
DEPRECATED RDW RBC AUTO: 41.4 FL (ref 37–54)
EGFRCR SERPLBLD CKD-EPI 2021: 98.9 ML/MIN/1.73
EOSINOPHIL # BLD AUTO: 0.09 10*3/MM3 (ref 0–0.4)
EOSINOPHIL NFR BLD AUTO: 1.6 % (ref 0.3–6.2)
ERYTHROCYTE [DISTWIDTH] IN BLOOD BY AUTOMATED COUNT: 12 % (ref 12.3–15.4)
GLOBULIN UR ELPH-MCNC: 2 GM/DL
GLUCOSE SERPL-MCNC: 103 MG/DL (ref 65–99)
HCT VFR BLD AUTO: 38.2 % (ref 37.5–51)
HDLC SERPL-MCNC: 59 MG/DL (ref 40–60)
HGB BLD-MCNC: 13.3 G/DL (ref 13–17.7)
IMM GRANULOCYTES # BLD AUTO: 0.02 10*3/MM3 (ref 0–0.05)
IMM GRANULOCYTES NFR BLD AUTO: 0.4 % (ref 0–0.5)
LDLC SERPL CALC-MCNC: 95 MG/DL (ref 0–100)
LDLC/HDLC SERPL: 1.49 {RATIO}
LYMPHOCYTES # BLD AUTO: 1.8 10*3/MM3 (ref 0.7–3.1)
LYMPHOCYTES NFR BLD AUTO: 32.7 % (ref 19.6–45.3)
MCH RBC QN AUTO: 32.5 PG (ref 26.6–33)
MCHC RBC AUTO-ENTMCNC: 34.8 G/DL (ref 31.5–35.7)
MCV RBC AUTO: 93.4 FL (ref 79–97)
MONOCYTES # BLD AUTO: 0.44 10*3/MM3 (ref 0.1–0.9)
MONOCYTES NFR BLD AUTO: 8 % (ref 5–12)
NEUTROPHILS NFR BLD AUTO: 3.09 10*3/MM3 (ref 1.7–7)
NEUTROPHILS NFR BLD AUTO: 56 % (ref 42.7–76)
NRBC BLD AUTO-RTO: 0 /100 WBC (ref 0–0.2)
PLATELET # BLD AUTO: 235 10*3/MM3 (ref 140–450)
PMV BLD AUTO: 10.2 FL (ref 6–12)
POTASSIUM SERPL-SCNC: 4.3 MMOL/L (ref 3.5–5.2)
PROT SERPL-MCNC: 6.7 G/DL (ref 6–8.5)
RBC # BLD AUTO: 4.09 10*6/MM3 (ref 4.14–5.8)
SODIUM SERPL-SCNC: 140 MMOL/L (ref 136–145)
TRIGL SERPL-MCNC: 204 MG/DL (ref 0–150)
TSH SERPL DL<=0.05 MIU/L-ACNC: 2.14 UIU/ML (ref 0.27–4.2)
VLDLC SERPL-MCNC: 34 MG/DL (ref 5–40)
WBC NRBC COR # BLD: 5.51 10*3/MM3 (ref 3.4–10.8)

## 2022-09-16 PROCEDURE — G0439 PPPS, SUBSEQ VISIT: HCPCS | Performed by: PHYSICIAN ASSISTANT

## 2022-09-16 PROCEDURE — 99213 OFFICE O/P EST LOW 20 MIN: CPT | Performed by: PHYSICIAN ASSISTANT

## 2022-09-16 PROCEDURE — 1160F RVW MEDS BY RX/DR IN RCRD: CPT | Performed by: PHYSICIAN ASSISTANT

## 2022-09-16 PROCEDURE — 80061 LIPID PANEL: CPT

## 2022-09-16 PROCEDURE — 96160 PT-FOCUSED HLTH RISK ASSMT: CPT | Performed by: PHYSICIAN ASSISTANT

## 2022-09-16 PROCEDURE — 80053 COMPREHEN METABOLIC PANEL: CPT

## 2022-09-16 PROCEDURE — 84443 ASSAY THYROID STIM HORMONE: CPT

## 2022-09-16 PROCEDURE — 1170F FXNL STATUS ASSESSED: CPT | Performed by: PHYSICIAN ASSISTANT

## 2022-09-16 PROCEDURE — 1126F AMNT PAIN NOTED NONE PRSNT: CPT | Performed by: PHYSICIAN ASSISTANT

## 2022-09-16 PROCEDURE — 85025 COMPLETE CBC W/AUTO DIFF WBC: CPT

## 2022-09-16 RX ORDER — LISINOPRIL 5 MG/1
5 TABLET ORAL DAILY
Qty: 90 TABLET | Refills: 3 | Status: SHIPPED | OUTPATIENT
Start: 2022-09-16

## 2022-09-16 RX ORDER — BNT162B2 0.23 MG/2.25ML
INJECTION, SUSPENSION INTRAMUSCULAR
COMMUNITY
Start: 2022-08-03 | End: 2023-02-02

## 2022-09-16 RX ORDER — PNEUMOCOCCAL 20-VALENT CONJUGATE VACCINE 2.2; 2.2; 2.2; 2.2; 2.2; 2.2; 2.2; 2.2; 2.2; 2.2; 2.2; 2.2; 2.2; 2.2; 2.2; 2.2; 4.4; 2.2; 2.2; 2.2 UG/.5ML; UG/.5ML; UG/.5ML; UG/.5ML; UG/.5ML; UG/.5ML; UG/.5ML; UG/.5ML; UG/.5ML; UG/.5ML; UG/.5ML; UG/.5ML; UG/.5ML; UG/.5ML; UG/.5ML; UG/.5ML; UG/.5ML; UG/.5ML; UG/.5ML; UG/.5ML
INJECTION, SUSPENSION INTRAMUSCULAR
COMMUNITY
Start: 2022-08-03 | End: 2023-02-02

## 2022-09-16 RX ORDER — ROSUVASTATIN CALCIUM 5 MG/1
5 TABLET, COATED ORAL DAILY
Qty: 90 TABLET | Refills: 3 | Status: SHIPPED | OUTPATIENT
Start: 2022-09-16

## 2022-09-16 RX ORDER — AMLODIPINE BESYLATE 2.5 MG/1
2.5 TABLET ORAL DAILY
Qty: 90 TABLET | Refills: 3 | Status: SHIPPED | OUTPATIENT
Start: 2022-09-16

## 2022-09-16 NOTE — ASSESSMENT & PLAN NOTE
Recommend annual eye examinations and a colonoscopy in the future. Ordered routine blood work today.

## 2022-09-16 NOTE — PROGRESS NOTES
"The ABCs of the Annual Wellness Visit  Subsequent Medicare Wellness Visit    Chief Complaint   Patient presents with   • Medicare Wellness-subsequent   • Hyperlipidemia   • Hypertension      Subjective    History of Present Illness:  Russell Soto is a 67 y.o. male who presents for a Subsequent Medicare Wellness Visit.    The patient reports following with cardiology every 6 months. He confirms wearing a Holter monitor and that the cardiologist was not concerned about the transient episodes of ventricular tachycardia. He notes that he then did an echocardiogram and they found some atherosclerosis in his aorta and they prescribed amlodipine 2.5 mg. He brought readings of his blood pressure which shows his lowest systolic blood pressure is 109 mm/Hg and highest 139 mm/Hg and diastolic blood pressure is always good. He confirms he is still taking lisinopril 5 mg.    He notes he still has the fullness in his ears bilaterally and it has a \"watery sound\" and feels like \"somebody is beating on a balloon.\" He inquires about treatment options. He confirms he saw ENT in the hospital and they did a hearing test which he passed. He denies trying nasal sprays.    His physical health is better than it was 1 year ago. He notes he has become adapted to his chronic sciatica and accepted his limitations and that spinal surgery will not resolve it. He is uncertain if his [place] footwear helps with his sciatica.     He denies having a colonoscopy since 2012 and states he recalls having 1 colonoscopy and then another one 3 or 4 years later when an internal hemorrhoid bled and they did not find anything concerning. He denies ever having polyps. He inquires if he needs another screening and declines to do Cologuard or a colonoscopy this year.    Family history: The patient denies family history of glaucoma or macular degeneration.    The following portions of the patient's history were reviewed and   updated as appropriate: " allergies, current medications, past family history, past medical history, past social history, past surgical history and problem list.    Compared to one year ago, the patient feels his physical   health is better. Feels like he has adapted to sciatic pain.     Compared to one year ago, the patient feels his mental   health is the same.    Recent Hospitalizations:  He was not admitted to the hospital during the last year.       Current Medical Providers:  Patient Care Team:  Sheryl Cevallos PA as PCP - General (Internal Medicine)  Sheryl Cevallos PA as Referring Physician (Internal Medicine)    Outpatient Medications Prior to Visit   Medication Sig Dispense Refill   • amLODIPine (NORVASC) 2.5 MG tablet Take 1 tablet by mouth Daily. 90 tablet 3   • lisinopril (PRINIVIL,ZESTRIL) 5 MG tablet Take 1 tablet by mouth once daily 90 tablet 0   • rosuvastatin (CRESTOR) 5 MG tablet Take 1 tablet by mouth once daily 90 tablet 0   • Pfizer-BioNT COVID-19 Vac-Eleni 30 MCG/0.3ML suspension      • Prevnar 20 0.5 ML suspension prefilled syringe vaccine        No facility-administered medications prior to visit.       No opioid medication identified on active medication list. I have reviewed chart for other potential  high risk medication/s and harmful drug interactions in the elderly.          Aspirin is not on active medication list.  Aspirin use is not indicated based on review of current medical condition/s. Risk of harm outweighs potential benefits.  .    Patient Active Problem List   Diagnosis   • Sinus bradycardia   • BPH (benign prostatic hypertrophy)   • Anemia   • Palpitations   • Health care maintenance   • Hyperlipidemia   • Acute bilateral low back pain with bilateral sciatica   • Hypertension   • Sepsis (HCC)   • Spinal stenosis, lumbar region, with neurogenic claudication   • Lumbar stenosis with neurogenic claudication     Advance Care Planning  Advance Directive is not on file.  ACP discussion was held with the  "patient during this visit. Patient does not have an advance directive, declines further assistance.    Review of Systems   Constitutional: Negative for activity change, appetite change, diaphoresis and fatigue.   HENT: Positive for tinnitus. Negative for congestion, postnasal drip and rhinorrhea.    Respiratory: Negative for chest tightness, shortness of breath and wheezing.    Cardiovascular: Negative for chest pain and palpitations.   Gastrointestinal: Negative for constipation, diarrhea and nausea.   Genitourinary: Negative for dysuria.   Neurological: Negative for dizziness, syncope and weakness.   Psychiatric/Behavioral: Negative for dysphoric mood.        Objective    Vitals:    09/16/22 0817   BP: 152/84   Pulse: 60   Temp: 97.7 °F (36.5 °C)   SpO2: 95%   Weight: 85.4 kg (188 lb 3.2 oz)   PainSc: 0-No pain     Estimated body mass index is 29.48 kg/m² as calculated from the following:    Height as of 6/1/22: 170.2 cm (67\").    Weight as of this encounter: 85.4 kg (188 lb 3.2 oz).    BMI is >= 25 and <30. (Overweight) The following options were offered after discussion;: exercise counseling/recommendations and nutrition counseling/recommendations      Does the patient have evidence of cognitive impairment? No    Physical Exam  Vitals and nursing note reviewed.   Constitutional:       Appearance: He is well-developed.   HENT:      Head: Normocephalic.      Right Ear: Hearing, ear canal and external ear normal. Tympanic membrane is bulging.      Left Ear: Hearing, ear canal and external ear normal. Tympanic membrane is bulging.      Ears:      Comments: Cerumen in left ear canal. Less cerumen in right ear drum closer to tympanic membrane.     Nose: Nose normal.   Eyes:      Conjunctiva/sclera: Conjunctivae normal.      Pupils: Pupils are equal, round, and reactive to light.   Cardiovascular:      Rate and Rhythm: Normal rate and regular rhythm.      Heart sounds: Normal heart sounds.   Pulmonary:      Effort: " Pulmonary effort is normal.      Breath sounds: Normal breath sounds. No decreased breath sounds, wheezing, rhonchi or rales.   Musculoskeletal:         General: Normal range of motion.      Cervical back: Normal range of motion.   Skin:     General: Skin is warm and dry.   Neurological:      Mental Status: He is alert.   Psychiatric:         Behavior: Behavior normal.            HEALTH RISK ASSESSMENT    Smoking Status:  Social History     Tobacco Use   Smoking Status Former Smoker   • Packs/day: 1.00   • Years: 30.00   • Pack years: 30.00   • Types: Cigarettes   • Quit date: 1997   • Years since quittin.0   Smokeless Tobacco Never Used     Alcohol Consumption:  Social History     Substance and Sexual Activity   Alcohol Use Yes    Comment: Social     Fall Risk Screen:    ELISAADI Fall Risk Assessment was completed, and patient is at LOW risk for falls.Assessment completed on:2022    Depression Screening:  PHQ-2/PHQ-9 Depression Screening 2022   Retired PHQ-9 Total Score -   Retired Total Score -   Little Interest or Pleasure in Doing Things 0-->not at all   Feeling Down, Depressed or Hopeless 0-->not at all   PHQ-9: Brief Depression Severity Measure Score 0       Health Habits and Functional and Cognitive Screening:  Functional & Cognitive Status 2022   Do you have difficulty preparing food and eating? No   Do you have difficulty bathing yourself, getting dressed or grooming yourself? No   Do you have difficulty using the toilet? No   Do you have difficulty moving around from place to place? No   Do you have trouble with steps or getting out of a bed or a chair? No   Current Diet Well Balanced Diet   Dental Exam Up to date   Eye Exam Not up to date   Exercise (times per week) 7 times per week        Exercise Frequency Comment -   Current Exercises Include Walking        Exercise Comment stretching   Current Exercise Activities Include -   Do you need help using the phone?  No   Are you deaf  or do you have serious difficulty hearing?  No   Do you need help with transportation? No   Do you need help shopping? No   Do you need help preparing meals?  No   Do you need help with housework?  No   Do you need help with laundry? No   Do you need help taking your medications? No   Do you need help managing money? No   Do you ever drive or ride in a car without wearing a seat belt? No   Have you felt unusual stress, anger or loneliness in the last month? No   Who do you live with? Spouse   If you need help, do you have trouble finding someone available to you? No   Have you been bothered in the last four weeks by sexual problems? No   Do you have difficulty concentrating, remembering or making decisions? No       Age-appropriate Screening Schedule:  Refer to the list below for future screening recommendations based on patient's age, sex and/or medical conditions. Orders for these recommended tests are listed in the plan section. The patient has been provided with a written plan.    Health Maintenance   Topic Date Due   • INFLUENZA VACCINE  10/01/2022   • LIPID PANEL  09/16/2023   • TDAP/TD VACCINES (2 - Td or Tdap) 07/25/2026   • ZOSTER VACCINE  Addressed              Assessment & Plan   CMS Preventative Services Quick Reference  Risk Factors Identified During Encounter  Cardiovascular Disease  Chronic Pain   Hearing Problem  Inactivity/Sedentary  The above risks/problems have been discussed with the patient.  Follow up actions/plans if indicated are seen below in the Assessment/Plan Section.  Pertinent information has been shared with the patient in the After Visit Summary.    Diagnoses and all orders for this visit:    1. Encounter for Medicare annual wellness exam (Primary)    2. Other secondary hypertension  Assessment & Plan:  Hypertension is improving with treatment.  Continue current treatment regimen.  Dietary sodium restriction.  Weight loss.  Regular aerobic exercise.  Continue current  medications.  Ambulatory blood pressure monitoring.  Blood pressure will be reassessed at the next regular appointment.    Orders:  -     amLODIPine (NORVASC) 2.5 MG tablet; Take 1 tablet by mouth Daily.  Dispense: 90 tablet; Refill: 3  -     lisinopril (PRINIVIL,ZESTRIL) 5 MG tablet; Take 1 tablet by mouth Daily.  Dispense: 90 tablet; Refill: 3  -     Comprehensive Metabolic Panel; Future    3. Mixed hyperlipidemia  -     rosuvastatin (CRESTOR) 5 MG tablet; Take 1 tablet by mouth Daily.  Dispense: 90 tablet; Refill: 3  -     Lipid Panel; Future  -     TSH; Future    4. Anemia, unspecified type  -     CBC & Differential; Future    5. Ear fullness, bilateral  Comments:  Bilateral ear irrigation done during today's visit. Recommended trial using nasal sprays like Flonase and Astelin for post nasal drainage. If symptoms persist will refer to ENT.          Follow Up:   Return in about 1 year (around 9/16/2023) for Medicare Wellness.     An After Visit Summary and PPPS were made available to the patient.      Transcribed from ambient dictation for RICCO Brown by Eliza Melendez.  09/16/22   11:48 EDT    Patient verbalized consent to the visit recording.  I have personally performed the services described in this document as transcribed by the above individual, and it is both accurate and complete.  RICCO Brown  9/16/2022  22:13 EDT

## 2022-09-19 NOTE — PROGRESS NOTES
Your labs show improvement in your cholesterol and blood sugar (glucose). Pelase continue with your healthy diet and regular exercise.    Everything else is stable and looks fine.

## 2022-10-03 NOTE — PROGRESS NOTES
QUICK REFERENCE INFORMATION:  The ABCs of the Annual Wellness Visit    Welcome to Medicare Visit    HEALTH RISK ASSESSMENT    1955    Recent Hospitalizations:  No hospitalization(s) within the last year..      Current Medical Providers:  Patient Care Team:  Sheryl Cevallos PA as PCP - General (Internal Medicine)  Sheryl Cevallos PA as Referring Physician (Internal Medicine)      Smoking Status:  History   Smoking Status   • Former Smoker   • Types: Cigarettes   • Quit date: 9/14/1997   Smokeless Tobacco   • Never Used       Alcohol Consumption:  History   Alcohol Use   • Yes     Comment: Social       Depression Screen:   PHQ-2/PHQ-9 Depression Screening 8/16/2018   Little interest or pleasure in doing things 0   Feeling down, depressed, or hopeless 0   Total Score 0       Health Habits and Functional and Cognitive Screening:  Functional & Cognitive Status 8/16/2018   Do you have difficulty preparing food and eating? No   Do you have difficulty bathing yourself, getting dressed or grooming yourself? No   Do you have difficulty using the toilet? No   Do you have difficulty moving around from place to place? No   Do you have trouble with steps or getting out of a bed or a chair? No   In the past year have you fallen or experienced a near fall? No   Current Diet Well Balanced Diet   Dental Exam Up to date   Eye Exam Not up to date   Exercise (times per week) 1-2 times per week   Current Exercise Activities Include Yard Work   Do you need help using the phone?  No   Are you deaf or do you have serious difficulty hearing?  No   Do you need help with transportation? No   Do you need help shopping? No   Do you need help preparing meals?  No   Do you need help with housework?  No   Do you need help with laundry? No   Do you need help taking your medications? No   Do you need help managing money? No   Do you ever drive or ride in a car without wearing a seat belt? No   Have you felt unusual stress, anger or loneliness in  the last month? No   Who do you live with? Spouse   If you need help, do you have trouble finding someone available to you? No   Have you been bothered in the last four weeks by sexual problems? No   Do you have difficulty concentrating, remembering or making decisions? No           Does the patient have evidence of cognitive impairment? No    Aspirin use counseling? Start ASA 81 mg daily       Recent Lab Results:  CMP:  Lab Results   Component Value Date    BUN 9 08/16/2018    CREATININE 0.72 08/16/2018    EGFRIFNONA 110 08/16/2018    BCR 12.5 08/16/2018     08/16/2018    K 4.0 08/16/2018    CO2 29.0 08/16/2018    CALCIUM 9.2 08/16/2018    ALBUMIN 4.42 08/16/2018    BILITOT 0.7 08/16/2018    ALKPHOS 70 08/16/2018    AST 24 08/16/2018    ALT 22 08/16/2018     Lipid Panel:  Lab Results   Component Value Date    CHOL 234 (H) 08/16/2018    TRIG 247 (H) 08/16/2018    HDL 54 08/16/2018     HbA1c:  Lab Results   Component Value Date    HGBA1C 5.1 08/15/2017       Visual Acuity:  No exam data present    Age-appropriate Screening Schedule:  Refer to the list below for future screening recommendations based on patient's age, sex and/or medical conditions. Orders for these recommended tests are listed in the plan section. The patient has been provided with a written plan.    Health Maintenance   Topic Date Due   • INFLUENZA VACCINE  08/01/2018   • PNEUMOCOCCAL VACCINES (65+ LOW/MEDIUM RISK) (1 of 2 - PCV13) 08/16/2019 (Originally 2/4/2018)   • ZOSTER VACCINE (2 of 2) 08/16/2019 (Originally 10/10/2017)   • LIPID PANEL  08/16/2019   • COLONOSCOPY  09/08/2022   • TDAP/TD VACCINES (2 - Td) 07/25/2026        Subjective   History of Present Illness    Russell Soto is a 63 y.o. male an established patient presenting for a Welcome to Medicare Visit.     Pt smoked for for 30 years, 1-1.5 ppd. No cough, shortness of breath. Quit smoking 1995.    The following portions of the patient's history were reviewed and updated as  appropriate: allergies, current medications, past family history, past medical history, past social history, past surgical history and problem list.    Outpatient Medications Prior to Visit   Medication Sig Dispense Refill   • lisinopril (PRINIVIL,ZESTRIL) 5 MG tablet Take 1 tablet by mouth Daily. 30 tablet 2     No facility-administered medications prior to visit.        Patient Active Problem List   Diagnosis   • Sinus bradycardia   • BPH (benign prostatic hypertrophy)   • Anemia   • Palpitations   • Health care maintenance   • Hyperlipidemia   • Acute bilateral low back pain with bilateral sciatica   • Hypertension   • Sepsis (CMS/HCC)   • Spinal stenosis, lumbar region, with neurogenic claudication   • Lumbar stenosis with neurogenic claudication       Advance Care Planning:  has NO advance directive - information provided to the patient today    Identification of Risk Factors:  Risk factors include: inactivity, chronic pain and incontinence.    Review of Systems   Constitutional: Negative for activity change, appetite change, fatigue, fever and unexpected weight change.   HENT: Negative for congestion, ear pain, facial swelling, rhinorrhea and sore throat.    Eyes: Negative for pain and visual disturbance.   Respiratory: Negative for chest tightness, shortness of breath and wheezing.    Cardiovascular: Negative for chest pain and palpitations.   Gastrointestinal: Negative for abdominal pain and blood in stool.   Endocrine: Negative.    Genitourinary: Negative for difficulty urinating, dysuria and hematuria.   Musculoskeletal: Negative for arthralgias, joint swelling and myalgias.   Neurological: Negative for dizziness, tremors, seizures, syncope, weakness, light-headedness and headaches.   Hematological: Negative for adenopathy.   Psychiatric/Behavioral: Negative.  Negative for dysphoric mood. The patient is not nervous/anxious.        Compared to one year ago, the patient feels his physical health is  worse.  Compared to one year ago, the patient feels his mental health is the same.    Objective    Physical Exam   Constitutional: He is oriented to person, place, and time. He appears well-developed and well-nourished. No distress.   HENT:   Head: Normocephalic and atraumatic. Hair is normal.   Right Ear: Hearing, tympanic membrane, external ear and ear canal normal.   Left Ear: Hearing, tympanic membrane, external ear and ear canal normal.   Nose: No sinus tenderness or nasal deformity.   Mouth/Throat: Uvula is midline, oropharynx is clear and moist and mucous membranes are normal. No oral lesions. No uvula swelling.   Eyes: Pupils are equal, round, and reactive to light. Conjunctivae, EOM and lids are normal. Right eye exhibits no discharge. Left eye exhibits no discharge. No scleral icterus. Right eye exhibits normal extraocular motion and no nystagmus. Left eye exhibits normal extraocular motion and no nystagmus.   Fundoscopic exam:       The right eye shows red reflex.        The left eye shows red reflex.   Neck: Normal range of motion. Neck supple. No JVD present. No tracheal deviation present. No thyromegaly present.   Cardiovascular: Normal rate, regular rhythm, normal heart sounds, intact distal pulses and normal pulses.  Exam reveals no gallop.    No murmur heard.  Pulmonary/Chest: Effort normal and breath sounds normal. No respiratory distress. He has no wheezes. He has no rales. He exhibits no tenderness.   Abdominal: Soft. Bowel sounds are normal. He exhibits no distension and no mass. There is no tenderness. There is no guarding. No hernia.   Genitourinary: Rectum normal and prostate normal.   Musculoskeletal: Normal range of motion. He exhibits no edema, tenderness or deformity.   Lymphadenopathy:     He has no cervical adenopathy.   Neurological: He is alert and oriented to person, place, and time. He has normal reflexes. He displays normal reflexes. No cranial nerve deficit. He exhibits normal  "muscle tone. Coordination normal.   Skin: Skin is warm and dry. No rash noted. He is not diaphoretic.   Psychiatric: He has a normal mood and affect. His behavior is normal. Judgment and thought content normal.   Nursing note and vitals reviewed.      Vitals:    08/16/18 0825   BP: 128/72   Pulse: 57   SpO2: 98%   Weight: 84.4 kg (186 lb)   Height: 170.2 cm (67\")   PainSc: 0-No pain       Patient's Body mass index is 29.13 kg/m². BMI is above normal parameters. Recommendations include: exercise counseling, no follow-up required and nutrition counseling.      Procedure     ECG 12 Lead  Date/Time: 8/17/2018 8:50 AM  Performed by: DANTE CALDWELL  Authorized by: DANTE CALDWELL   Rhythm: sinus rhythm  Rate: bradycardic  Conduction: conduction normal  ST Segments: ST segments normal  T Waves: T waves normal  QRS axis: normal  Clinical impression: normal ECG               Assessment/Plan   Patient Self-Management and Personalized Health Advice  The patient has been provided with information about: diet, exercise, weight management and designing advance directives and preventive services including:   · Advance directive, Alcohol use counseling completed, Exercise counseling provided, Pneumococcal vaccine , Prostate cancer screening discussed, Screening for AAA, referral for ultrasound placed, Screening electrocardiogram, shingrix discussed.    Visit Diagnoses:    ICD-10-CM ICD-9-CM   1. Welcome to Medicare preventive visit Z00.00 V70.0   2. Hyperlipidemia, unspecified hyperlipidemia type E78.5 272.4   3. Anemia, unspecified type D64.9 285.9   4. Other secondary hypertension I15.8 405.99   5. Personal history of tobacco use, presenting hazards to health Z87.891 V15.82       Orders Placed This Encounter   Procedures   • US aorta limited     Standing Status:   Future     Standing Expiration Date:   8/23/2019     Order Specific Question:   Reason for Exam:     Answer:   AAA screening   • Comprehensive Metabolic Panel   • Lipid " Panel   • CBC Auto Differential   • ECG 12 Lead     This order was created via procedure documentation   • CBC & Differential     Order Specific Question:   Manual Differential     Answer:   No       Outpatient Encounter Prescriptions as of 8/16/2018   Medication Sig Dispense Refill   • lisinopril (PRINIVIL,ZESTRIL) 5 MG tablet Take 1 tablet by mouth Daily. 90 tablet 11   • [DISCONTINUED] lisinopril (PRINIVIL,ZESTRIL) 5 MG tablet Take 1 tablet by mouth Daily. 30 tablet 2     No facility-administered encounter medications on file as of 8/16/2018.        Reviewed use of high risk medication in the elderly: yes  Reviewed for potential of harmful drug interactions in the elderly: yes    Follow Up:  Return in about 1 year (around 8/16/2019) for Medicare Wellness.     An After Visit Summary and PPPS with all of these plans were given to the patient.          Speaking Coherently

## 2023-02-02 ENCOUNTER — TELEPHONE (OUTPATIENT)
Dept: INTERNAL MEDICINE | Facility: CLINIC | Age: 68
End: 2023-02-02

## 2023-02-02 ENCOUNTER — OFFICE VISIT (OUTPATIENT)
Dept: INTERNAL MEDICINE | Facility: CLINIC | Age: 68
End: 2023-02-02
Payer: MEDICARE

## 2023-02-02 VITALS
WEIGHT: 195.6 LBS | HEART RATE: 88 BPM | SYSTOLIC BLOOD PRESSURE: 152 MMHG | DIASTOLIC BLOOD PRESSURE: 78 MMHG | BODY MASS INDEX: 30.64 KG/M2 | OXYGEN SATURATION: 96 %

## 2023-02-02 DIAGNOSIS — M54.50 ACUTE LEFT-SIDED LOW BACK PAIN WITHOUT SCIATICA: Primary | ICD-10-CM

## 2023-02-02 PROCEDURE — 99213 OFFICE O/P EST LOW 20 MIN: CPT | Performed by: PHYSICIAN ASSISTANT

## 2023-02-02 PROCEDURE — 96372 THER/PROPH/DIAG INJ SC/IM: CPT | Performed by: PHYSICIAN ASSISTANT

## 2023-02-02 RX ORDER — CYCLOBENZAPRINE HCL 5 MG
5 TABLET ORAL 3 TIMES DAILY PRN
Qty: 20 TABLET | Refills: 0 | Status: SHIPPED | OUTPATIENT
Start: 2023-02-02

## 2023-02-02 RX ORDER — METHYLPREDNISOLONE 4 MG/1
TABLET ORAL
Qty: 21 TABLET | Refills: 0 | Status: SHIPPED | OUTPATIENT
Start: 2023-02-02

## 2023-02-02 RX ORDER — METHYLPREDNISOLONE ACETATE 40 MG/ML
40 INJECTION, SUSPENSION INTRA-ARTICULAR; INTRALESIONAL; INTRAMUSCULAR; SOFT TISSUE ONCE
Status: COMPLETED | OUTPATIENT
Start: 2023-02-02 | End: 2023-02-02

## 2023-02-02 RX ADMIN — METHYLPREDNISOLONE ACETATE 40 MG: 40 INJECTION, SUSPENSION INTRA-ARTICULAR; INTRALESIONAL; INTRAMUSCULAR; SOFT TISSUE at 13:43

## 2023-02-02 NOTE — PROGRESS NOTES
"Chief Complaint   Patient presents with   • Back Pain     Acute         Subjective     Russellerica Soto is a 68 y.o. male.        History of Present Illness       The patient presents today for back pain. He is accompanied by his wife.     Mr. Soto is having pain in his left lower back. He had bilateral back pain in the past; however, the pain is now localized. He has grown accustomed to pain shooting down his legs periodically. Getting up, moving around and taking a walk usually helps, but it is not effective at this time. He woke up in pain and when getting out of bed it caused more pain. It seems to occur mostly when he puts weight on his hips. He laid down on the floor on his right side and had no pain at all. He rolled over to his back, was able to do leg lifts, could draw his knees to his chest, was able to move his arms around, could roll over both ways, but as soon as he sat up, it hurt again. While sitting in the chair currently, he has found a position where it does not cause pain, but a moment ago, he tried to adjust his position and it hurt in the new position. This episode started on 01/31/2023. The pain became worse on 02/01/2023 and has not improved today. The pain is different than prior episodes and is continuous. The pain is localized and does not radiate, regardless of what movement he makes to cause it to hurt. Last night, he rolled over to his right side, but not completely, and he reached around behind him and grabbed the opposite side of his bed and pulled the hip to try to get himself all the way rolled over. He could hear and feel 2 \"clunks\" in his mid-spine area and he suddenly felt great. He denies any new numbness or weakness in his legs. He has been taking ibuprofen. Standing upright causes more pain as opposed to bending. He has not done anything out of the ordinary. He tried to walk off the pain and had to sit down a lot and was unsuccessful.    He had an episode of food poisoning " with vomiting. He consumed lots of spicy food.     He has gained weight and has not been exercising due to the pain.     He has occasional acid reflux and does not take any medications for it. He does not want to add any other medications to his regimen.       Current Outpatient Medications:   •  amLODIPine (NORVASC) 2.5 MG tablet, Take 1 tablet by mouth Daily., Disp: 90 tablet, Rfl: 3  •  lisinopril (PRINIVIL,ZESTRIL) 5 MG tablet, Take 1 tablet by mouth Daily., Disp: 90 tablet, Rfl: 3  •  rosuvastatin (CRESTOR) 5 MG tablet, Take 1 tablet by mouth Daily., Disp: 90 tablet, Rfl: 3  •  cyclobenzaprine (FLEXERIL) 5 MG tablet, Take 1 tablet by mouth 3 (Three) Times a Day As Needed for Muscle Spasms., Disp: 20 tablet, Rfl: 0  •  methylPREDNISolone (Medrol) 4 MG dose pack, follow package directions, Disp: 21 tablet, Rfl: 0     PMFSH  The following portions of the patient's history were reviewed and updated as appropriate: allergies, current medications, past family history, past medical history, past social history, past surgical history and problem list.    Review of Systems   Constitutional: Negative for appetite change, fever and unexpected weight change.   HENT: Negative.    Eyes: Negative for pain and visual disturbance.   Respiratory: Negative for chest tightness, shortness of breath and wheezing.    Cardiovascular: Negative for chest pain and palpitations.   Gastrointestinal: Negative for abdominal pain, blood in stool, diarrhea, nausea and vomiting.   Endocrine: Negative.    Genitourinary: Negative for difficulty urinating, flank pain, frequency and urgency.   Musculoskeletal: Positive for back pain. Negative for joint swelling.   Skin: Negative for color change and rash.   Neurological: Negative for dizziness, tremors, weakness, light-headedness and headaches.   Hematological: Negative for adenopathy.   Psychiatric/Behavioral: Negative for confusion and decreased concentration.   All other systems reviewed and are  negative.      Objective   /78   Pulse 88   Wt 88.7 kg (195 lb 9.6 oz)   SpO2 96%   BMI 30.64 kg/m²     Physical Exam  Vitals and nursing note reviewed.   Constitutional:       General: He is not in acute distress.     Appearance: He is well-developed. He is not diaphoretic.   HENT:      Head: Normocephalic and atraumatic.   Eyes:      General: No scleral icterus.     Conjunctiva/sclera: Conjunctivae normal.      Pupils: Pupils are equal, round, and reactive to light.   Cardiovascular:      Rate and Rhythm: Normal rate and regular rhythm.      Heart sounds: Normal heart sounds. No murmur heard.    No gallop.   Pulmonary:      Effort: Pulmonary effort is normal. No respiratory distress.      Breath sounds: Normal breath sounds. No wheezing or rales.   Chest:      Chest wall: No tenderness.   Abdominal:      Palpations: Abdomen is soft.      Tenderness: There is no abdominal tenderness.   Musculoskeletal:         General: Tenderness present. No deformity.      Cervical back: Normal range of motion and neck supple.      Lumbar back: Tenderness present. No swelling, edema or deformity. Decreased range of motion.   Skin:     General: Skin is warm and dry.      Findings: No rash.   Neurological:      Mental Status: He is alert and oriented to person, place, and time.      Sensory: No sensory deficit.      Motor: No tremor, atrophy or abnormal muscle tone.      Coordination: Coordination normal.      Deep Tendon Reflexes: Reflexes are normal and symmetric. Reflexes normal.   Psychiatric:         Behavior: Behavior normal.         Thought Content: Thought content normal.         Judgment: Judgment normal.         ASSESSMENT/PLAN    Diagnoses and all orders for this visit:    1. Acute left-sided low back pain without sciatica (Primary)  -     methylPREDNISolone acetate (DEPO-medrol) injection 40 mg  -     methylPREDNISolone (Medrol) 4 MG dose pack; follow package directions  Dispense: 21 tablet; Refill: 0  -      cyclobenzaprine (FLEXERIL) 5 MG tablet; Take 1 tablet by mouth 3 (Three) Times a Day As Needed for Muscle Spasms.  Dispense: 20 tablet; Refill: 0    - Steroid injection given today.  - Start oral taper tomorrow.  - Start muscle relaxer at night.  - Continue stretches.  - Consider physical therapy.  - Avoid bending until pain resolves.  - May take Tylenol for breakthrough pain.  - Consider acupuncture.           Return if symptoms worsen or fail to improve, for Next scheduled follow up.     Transcribed from ambient dictation for RICCO Brown by Gregory Grant.  02/02/23   15:25 EST    Patient or patient representative verbalized consent to the visit recording.  I have personally performed the services described in this document as transcribed by the above individual, and it is both accurate and complete.

## 2023-09-09 DIAGNOSIS — E78.2 MIXED HYPERLIPIDEMIA: ICD-10-CM

## 2023-09-09 DIAGNOSIS — I15.8 OTHER SECONDARY HYPERTENSION: ICD-10-CM

## 2023-09-09 RX ORDER — LISINOPRIL 5 MG/1
TABLET ORAL
Qty: 90 TABLET | Refills: 0 | OUTPATIENT
Start: 2023-09-09

## 2023-09-09 RX ORDER — ROSUVASTATIN CALCIUM 5 MG/1
TABLET, COATED ORAL
Qty: 90 TABLET | Refills: 0 | OUTPATIENT
Start: 2023-09-09

## 2023-09-15 DIAGNOSIS — I15.8 OTHER SECONDARY HYPERTENSION: ICD-10-CM

## 2023-09-15 DIAGNOSIS — E78.2 MIXED HYPERLIPIDEMIA: ICD-10-CM

## 2023-09-15 RX ORDER — ROSUVASTATIN CALCIUM 5 MG/1
5 TABLET, COATED ORAL DAILY
Qty: 90 TABLET | Refills: 3 | Status: SHIPPED | OUTPATIENT
Start: 2023-09-15

## 2023-09-15 RX ORDER — LISINOPRIL 5 MG/1
5 TABLET ORAL DAILY
Qty: 90 TABLET | Refills: 3 | Status: SHIPPED | OUTPATIENT
Start: 2023-09-15

## 2023-09-26 ENCOUNTER — OFFICE VISIT (OUTPATIENT)
Dept: INTERNAL MEDICINE | Facility: CLINIC | Age: 68
End: 2023-09-26
Payer: MEDICARE

## 2023-09-26 ENCOUNTER — LAB (OUTPATIENT)
Dept: LAB | Facility: HOSPITAL | Age: 68
End: 2023-09-26
Payer: MEDICARE

## 2023-09-26 VITALS
DIASTOLIC BLOOD PRESSURE: 78 MMHG | HEIGHT: 67 IN | SYSTOLIC BLOOD PRESSURE: 134 MMHG | HEART RATE: 57 BPM | OXYGEN SATURATION: 95 % | BODY MASS INDEX: 29.7 KG/M2 | WEIGHT: 189.2 LBS

## 2023-09-26 DIAGNOSIS — Z00.00 ENCOUNTER FOR MEDICARE ANNUAL WELLNESS EXAM: Primary | ICD-10-CM

## 2023-09-26 DIAGNOSIS — E55.9 VITAMIN D DEFICIENCY: ICD-10-CM

## 2023-09-26 DIAGNOSIS — D64.9 ANEMIA, UNSPECIFIED TYPE: ICD-10-CM

## 2023-09-26 DIAGNOSIS — I15.8 OTHER SECONDARY HYPERTENSION: ICD-10-CM

## 2023-09-26 DIAGNOSIS — E78.2 MIXED HYPERLIPIDEMIA: ICD-10-CM

## 2023-09-26 PROBLEM — M48.062 SPINAL STENOSIS, LUMBAR REGION, WITH NEUROGENIC CLAUDICATION: Status: RESOLVED | Noted: 2017-09-18 | Resolved: 2023-09-26

## 2023-09-26 PROBLEM — A41.9 SEPSIS: Status: RESOLVED | Noted: 2017-09-14 | Resolved: 2023-09-26

## 2023-09-26 NOTE — PROGRESS NOTES
The ABCs of the Annual Wellness Visit  Subsequent Medicare Wellness Visit    Subjective    Russell Soto is a 68 y.o. male who presents for a Subsequent Medicare Wellness Visit.    The following portions of the patient's history were reviewed and   updated as appropriate: allergies, current medications, past family history, past medical history, past social history, past surgical history, and problem list.    Compared to one year ago, the patient feels his physical   health is the same.    Compared to one year ago, the patient feels his mental   health is the same.    Recent Hospitalizations:  He was not admitted to the hospital during the last year.       Current Medical Providers:  Patient Care Team:  Sheryl Cevallos PA as PCP - General (Internal Medicine)  Sheryl Cevallos PA as Referring Physician (Internal Medicine)    Outpatient Medications Prior to Visit   Medication Sig Dispense Refill    lisinopril (PRINIVIL,ZESTRIL) 5 MG tablet Take 1 tablet by mouth Daily. 90 tablet 3    rosuvastatin (CRESTOR) 5 MG tablet Take 1 tablet by mouth Daily. 90 tablet 3    amLODIPine (NORVASC) 2.5 MG tablet Take 1 tablet by mouth Daily. 90 tablet 3    cyclobenzaprine (FLEXERIL) 5 MG tablet Take 1 tablet by mouth 3 (Three) Times a Day As Needed for Muscle Spasms. 20 tablet 0    methylPREDNISolone (Medrol) 4 MG dose pack follow package directions 21 tablet 0     No facility-administered medications prior to visit.       No opioid medication identified on active medication list. I have reviewed chart for other potential  high risk medication/s and harmful drug interactions in the elderly.        Aspirin is not on active medication list.  Aspirin use is not indicated based on review of current medical condition/s. Risk of harm outweighs potential benefits.  .    Patient Active Problem List   Diagnosis    Sinus bradycardia    Benign prostatic hyperplasia with urinary obstruction    Anemia    Palpitations    Health care  "maintenance    Hyperlipidemia    Acute bilateral low back pain with bilateral sciatica    Hypertension    Lumbar stenosis with neurogenic claudication    History of prostate cancer     Advance Care Planning   Advance Care Planning     Advance Directive is not on file.  ACP discussion was held with the patient during this visit. Patient does not have an advance directive, declines further assistance.     Objective    Vitals:    23 0809   BP: 134/78   Pulse: 57   SpO2: 95%   Weight: 85.8 kg (189 lb 3.2 oz)   Height: 170.2 cm (67\")   PainSc: 0-No pain     Estimated body mass index is 29.63 kg/m² as calculated from the following:    Height as of this encounter: 170.2 cm (67\").    Weight as of this encounter: 85.8 kg (189 lb 3.2 oz).           Does the patient have evidence of cognitive impairment? No          HEALTH RISK ASSESSMENT    Smoking Status:  Social History     Tobacco Use   Smoking Status Former    Packs/day: 1.00    Years: 30.00    Pack years: 30.00    Types: Cigarettes    Quit date: 1997    Years since quittin.0   Smokeless Tobacco Never     Alcohol Consumption:  Social History     Substance and Sexual Activity   Alcohol Use Yes    Comment: Social     Fall Risk Screen:    STEADI Fall Risk Assessment was completed, and patient is at LOW risk for falls.Assessment completed on:2023    Depression Screenin/26/2023     8:12 AM   PHQ-2/PHQ-9 Depression Screening   Little Interest or Pleasure in Doing Things 0-->not at all   Feeling Down, Depressed or Hopeless 0-->not at all   PHQ-9: Brief Depression Severity Measure Score 0       Health Habits and Functional and Cognitive Screenin/26/2023     8:11 AM   Functional & Cognitive Status   Do you have difficulty preparing food and eating? No   Do you have difficulty bathing yourself, getting dressed or grooming yourself? No   Do you have difficulty using the toilet? No   Do you have difficulty moving around from place to place? No "   Do you have trouble with steps or getting out of a bed or a chair? No   Current Diet Well Balanced Diet   Dental Exam Up to date   Eye Exam Not up to date   Exercise (times per week) 7 times per week   Current Exercises Include Walking        Exercise Comment stretching/yoga   Do you need help using the phone?  No   Are you deaf or do you have serious difficulty hearing?  No   Do you need help to go to places out of walking distance? No   Do you need help shopping? No   Do you need help preparing meals?  No   Do you need help with housework?  No   Do you need help with laundry? No   Do you need help taking your medications? No   Do you need help managing money? No   Do you ever drive or ride in a car without wearing a seat belt? No   Have you felt unusual stress, anger or loneliness in the last month? No   Who do you live with? Spouse   If you need help, do you have trouble finding someone available to you? No   Have you been bothered in the last four weeks by sexual problems? No   Do you have difficulty concentrating, remembering or making decisions? No       Age-appropriate Screening Schedule:  Refer to the list below for future screening recommendations based on patient's age, sex and/or medical conditions. Orders for these recommended tests are listed in the plan section. The patient has been provided with a written plan.    Health Maintenance   Topic Date Due    LIPID PANEL  09/16/2023    BMI FOLLOWUP  09/16/2023    COVID-19 Vaccine (5 - Pfizer series) 11/05/2023 (Originally 9/28/2022)    COLORECTAL CANCER SCREENING  09/26/2024 (Originally 1955)    INFLUENZA VACCINE  10/01/2023    ANNUAL WELLNESS VISIT  09/26/2024    TDAP/TD VACCINES (2 - Td or Tdap) 07/25/2026    HEPATITIS C SCREENING  Completed    Pneumococcal Vaccine 65+  Completed    AAA SCREEN (ONE-TIME)  Completed    ZOSTER VACCINE  Addressed                  CMS Preventative Services Quick Reference  Risk Factors Identified During  "Encounter  Chronic Pain: OTC analgesics as needed. Proper dosing schedule discussed.   Immunizations Discussed/Encouraged: COVID19  Dental Screening Recommended  Vision Screening Recommended  The above risks/problems have been discussed with the patient.  Pertinent information has been shared with the patient in the After Visit Summary.  An After Visit Summary and PPPS were made available to the patient.    Follow Up:   Next Medicare Wellness visit to be scheduled in 1 year.       Additional E&M Note during same encounter follows:  Patient has multiple medical problems which are significant and separately identifiable that require additional work above and beyond the Medicare Wellness Visit.      Chief Complaint  Medicare Wellness-subsequent, Hypertension, and Hyperlipidemia    Subjective        HPI  Russell Soto is also being seen today for     Pt continues to have intermittent back pain. Had a flare in February and has gotten past that. If he does his exercises and takes occasional ibuprofen then it is manageable.      Review of Systems   Constitutional:  Negative for activity change, appetite change and fatigue.   Respiratory:  Negative for chest tightness and shortness of breath.    Cardiovascular:  Negative for chest pain, palpitations and leg swelling.   Gastrointestinal:  Negative for abdominal pain, constipation and diarrhea.   Genitourinary:  Negative for dysuria.   Musculoskeletal:  Positive for back pain and myalgias. Negative for gait problem.   Neurological:  Negative for dizziness, syncope, weakness and light-headedness.   Psychiatric/Behavioral:  Negative for dysphoric mood. The patient is not nervous/anxious.      Objective   Vital Signs:  /78   Pulse 57   Ht 170.2 cm (67\")   Wt 85.8 kg (189 lb 3.2 oz)   SpO2 95%   BMI 29.63 kg/m²     Physical Exam  Vitals and nursing note reviewed.   Constitutional:       Appearance: He is well-developed.   HENT:      Head: Normocephalic.      " Right Ear: Hearing, tympanic membrane, ear canal and external ear normal.      Left Ear: Hearing, tympanic membrane, ear canal and external ear normal.      Nose: Nose normal.   Eyes:      Conjunctiva/sclera: Conjunctivae normal.      Pupils: Pupils are equal, round, and reactive to light.   Cardiovascular:      Rate and Rhythm: Normal rate and regular rhythm.      Heart sounds: Normal heart sounds.   Pulmonary:      Effort: Pulmonary effort is normal.      Breath sounds: Normal breath sounds. No decreased breath sounds, wheezing, rhonchi or rales.   Abdominal:      General: Abdomen is flat. There is no distension.      Palpations: Abdomen is soft.      Tenderness: There is no abdominal tenderness. There is no guarding or rebound.   Musculoskeletal:         General: Normal range of motion.      Cervical back: Normal range of motion.   Skin:     General: Skin is warm and dry.   Neurological:      General: No focal deficit present.      Mental Status: He is alert and oriented to person, place, and time.      Motor: No weakness.      Gait: Gait normal.   Psychiatric:         Mood and Affect: Mood normal.         Behavior: Behavior normal.         Thought Content: Thought content normal.         Judgment: Judgment normal.                       Assessment and Plan   Diagnoses and all orders for this visit:    1. Encounter for Medicare annual wellness exam (Primary)    2. Mixed hyperlipidemia  Assessment & Plan:  Check lipid profile. Further recommendations based on results.      Orders:  -     Comprehensive Metabolic Panel; Future  -     Lipid Panel; Future  -     TSH; Future    3. Anemia, unspecified type  -     CBC & Differential; Future    4. Vitamin D deficiency  -     Vitamin D,25-Hydroxy; Future    5. Other secondary hypertension  Assessment & Plan:  Hypertension is unchanged.  Continue current treatment regimen.  Dietary sodium restriction.  Weight loss.  Regular aerobic exercise.  Medication changes per  orders.  Ambulatory blood pressure monitoring.  Blood pressure will be reassessed at the next regular appointment.               Follow Up   Return in about 1 year (around 9/26/2024) for Medicare Wellness.  Patient was given instructions and counseling regarding his condition or for health maintenance advice. Please see specific information pulled into the AVS if appropriate.

## 2023-09-26 NOTE — ASSESSMENT & PLAN NOTE
Hypertension is unchanged.  Continue current treatment regimen.  Dietary sodium restriction.  Weight loss.  Regular aerobic exercise.  Medication changes per orders.  Ambulatory blood pressure monitoring.  Blood pressure will be reassessed at the next regular appointment.

## 2023-09-27 LAB
25(OH)D3+25(OH)D2 SERPL-MCNC: 19 NG/ML (ref 30–100)
ALBUMIN SERPL-MCNC: 4.8 G/DL (ref 3.5–5.2)
ALBUMIN/GLOB SERPL: 2.5 G/DL
ALP SERPL-CCNC: 67 U/L (ref 39–117)
ALT SERPL-CCNC: 20 U/L (ref 1–41)
AST SERPL-CCNC: 23 U/L (ref 1–40)
BASOPHILS # BLD AUTO: 0.06 10*3/MM3 (ref 0–0.2)
BASOPHILS NFR BLD AUTO: 1 % (ref 0–1.5)
BILIRUB SERPL-MCNC: 0.4 MG/DL (ref 0–1.2)
BUN SERPL-MCNC: 11 MG/DL (ref 8–23)
BUN/CREAT SERPL: 16.2 (ref 7–25)
CALCIUM SERPL-MCNC: 9.8 MG/DL (ref 8.6–10.5)
CHLORIDE SERPL-SCNC: 103 MMOL/L (ref 98–107)
CHOLEST SERPL-MCNC: 168 MG/DL (ref 0–200)
CO2 SERPL-SCNC: 26.9 MMOL/L (ref 22–29)
CREAT SERPL-MCNC: 0.68 MG/DL (ref 0.76–1.27)
EGFRCR SERPLBLD CKD-EPI 2021: 100 ML/MIN/1.73
EOSINOPHIL # BLD AUTO: 0.29 10*3/MM3 (ref 0–0.4)
EOSINOPHIL NFR BLD AUTO: 5 % (ref 0.3–6.2)
ERYTHROCYTE [DISTWIDTH] IN BLOOD BY AUTOMATED COUNT: 12.3 % (ref 12.3–15.4)
GLOBULIN SER CALC-MCNC: 1.9 GM/DL
GLUCOSE SERPL-MCNC: 109 MG/DL (ref 65–99)
HCT VFR BLD AUTO: 37.4 % (ref 37.5–51)
HDLC SERPL-MCNC: 53 MG/DL (ref 40–60)
HGB BLD-MCNC: 12.9 G/DL (ref 13–17.7)
IMM GRANULOCYTES # BLD AUTO: 0.02 10*3/MM3 (ref 0–0.05)
IMM GRANULOCYTES NFR BLD AUTO: 0.3 % (ref 0–0.5)
LDLC SERPL CALC-MCNC: 89 MG/DL (ref 0–100)
LYMPHOCYTES # BLD AUTO: 1.65 10*3/MM3 (ref 0.7–3.1)
LYMPHOCYTES NFR BLD AUTO: 28.5 % (ref 19.6–45.3)
MCH RBC QN AUTO: 32.1 PG (ref 26.6–33)
MCHC RBC AUTO-ENTMCNC: 34.5 G/DL (ref 31.5–35.7)
MCV RBC AUTO: 93 FL (ref 79–97)
MONOCYTES # BLD AUTO: 0.58 10*3/MM3 (ref 0.1–0.9)
MONOCYTES NFR BLD AUTO: 10 % (ref 5–12)
NEUTROPHILS # BLD AUTO: 3.18 10*3/MM3 (ref 1.7–7)
NEUTROPHILS NFR BLD AUTO: 55.2 % (ref 42.7–76)
NRBC BLD AUTO-RTO: 0 /100 WBC (ref 0–0.2)
PLATELET # BLD AUTO: 234 10*3/MM3 (ref 140–450)
POTASSIUM SERPL-SCNC: 4.6 MMOL/L (ref 3.5–5.2)
PROT SERPL-MCNC: 6.7 G/DL (ref 6–8.5)
RBC # BLD AUTO: 4.02 10*6/MM3 (ref 4.14–5.8)
SODIUM SERPL-SCNC: 141 MMOL/L (ref 136–145)
TRIGL SERPL-MCNC: 152 MG/DL (ref 0–150)
TSH SERPL DL<=0.005 MIU/L-ACNC: 2.51 UIU/ML (ref 0.27–4.2)
VLDLC SERPL CALC-MCNC: 26 MG/DL (ref 5–40)
WBC # BLD AUTO: 5.78 10*3/MM3 (ref 3.4–10.8)

## 2023-10-06 DIAGNOSIS — E55.9 VITAMIN D DEFICIENCY: Primary | ICD-10-CM

## 2023-10-06 DIAGNOSIS — R73.9 HYPERGLYCEMIA: ICD-10-CM

## 2023-10-06 DIAGNOSIS — D64.9 ANEMIA, UNSPECIFIED TYPE: ICD-10-CM

## 2023-10-06 RX ORDER — ERGOCALCIFEROL 1.25 MG/1
50000 CAPSULE ORAL WEEKLY
Qty: 12 CAPSULE | Refills: 3 | Status: SHIPPED | OUTPATIENT
Start: 2023-10-06

## 2024-01-01 NOTE — TELEPHONE ENCOUNTER
Caller: Daysi Soto    Relationship: Emergency Contact    Best call back number: [phone]    What is the best time to reach you: ANY    Who are you requesting to speak with (clinical staff, provider,  specific staff member): RICCO TSE    What was the call regarding: PATIENT HAS BEEN EXPERIENCING EXTREME BACK PAIN FOR THE LAST TWO DAYS AND HAS BEEN SELF MEDICATING WITH OVER THE COUNTER ADVIL. PATIENT WOULD LIKE TO KNOW IF SOMETHING STRONGER COULD BE CALLED IN TO HELP WITH THE PAIN. PLEASE CONTACT THE PATIENT FOR FURTHER ADVISEMENT .     [address] - [phone] [facility] [phone]   [phone]    Do you require a callback:  YES     0

## 2024-06-25 ENCOUNTER — LAB (OUTPATIENT)
Dept: LAB | Facility: HOSPITAL | Age: 69
End: 2024-06-25
Payer: MEDICARE

## 2024-06-25 DIAGNOSIS — D64.9 ANEMIA, UNSPECIFIED TYPE: ICD-10-CM

## 2024-06-25 LAB
BASOPHILS # BLD AUTO: 0.06 10*3/MM3 (ref 0–0.2)
BASOPHILS NFR BLD AUTO: 1 % (ref 0–1.5)
DEPRECATED RDW RBC AUTO: 41.8 FL (ref 37–54)
EOSINOPHIL # BLD AUTO: 0.11 10*3/MM3 (ref 0–0.4)
EOSINOPHIL NFR BLD AUTO: 1.9 % (ref 0.3–6.2)
ERYTHROCYTE [DISTWIDTH] IN BLOOD BY AUTOMATED COUNT: 12.2 % (ref 12.3–15.4)
HCT VFR BLD AUTO: 37.4 % (ref 37.5–51)
HGB BLD-MCNC: 12.7 G/DL (ref 13–17.7)
IMM GRANULOCYTES # BLD AUTO: 0.03 10*3/MM3 (ref 0–0.05)
IMM GRANULOCYTES NFR BLD AUTO: 0.5 % (ref 0–0.5)
LYMPHOCYTES # BLD AUTO: 2.28 10*3/MM3 (ref 0.7–3.1)
LYMPHOCYTES NFR BLD AUTO: 38.5 % (ref 19.6–45.3)
MCH RBC QN AUTO: 32.2 PG (ref 26.6–33)
MCHC RBC AUTO-ENTMCNC: 34 G/DL (ref 31.5–35.7)
MCV RBC AUTO: 94.9 FL (ref 79–97)
MONOCYTES # BLD AUTO: 0.49 10*3/MM3 (ref 0.1–0.9)
MONOCYTES NFR BLD AUTO: 8.3 % (ref 5–12)
NEUTROPHILS NFR BLD AUTO: 2.95 10*3/MM3 (ref 1.7–7)
NEUTROPHILS NFR BLD AUTO: 49.8 % (ref 42.7–76)
NRBC BLD AUTO-RTO: 0 /100 WBC (ref 0–0.2)
PLATELET # BLD AUTO: 204 10*3/MM3 (ref 140–450)
PMV BLD AUTO: 9.8 FL (ref 6–12)
RBC # BLD AUTO: 3.94 10*6/MM3 (ref 4.14–5.8)
WBC NRBC COR # BLD AUTO: 5.92 10*3/MM3 (ref 3.4–10.8)

## 2024-06-25 PROCEDURE — 83036 HEMOGLOBIN GLYCOSYLATED A1C: CPT | Performed by: PHYSICIAN ASSISTANT

## 2024-06-25 PROCEDURE — 83540 ASSAY OF IRON: CPT | Performed by: PHYSICIAN ASSISTANT

## 2024-06-25 PROCEDURE — 84466 ASSAY OF TRANSFERRIN: CPT | Performed by: PHYSICIAN ASSISTANT

## 2024-06-25 PROCEDURE — 85025 COMPLETE CBC W/AUTO DIFF WBC: CPT

## 2024-06-28 NOTE — PROGRESS NOTES
You remain mildly anemic, but your hematocrit remains stable. Please have your new PCP continue to monitor this and can consider further workup with a colonoscopy if there is a decline in your levels.

## 2024-09-05 DIAGNOSIS — I15.8 OTHER SECONDARY HYPERTENSION: ICD-10-CM

## 2024-09-05 DIAGNOSIS — E78.2 MIXED HYPERLIPIDEMIA: ICD-10-CM

## 2024-09-05 RX ORDER — ROSUVASTATIN CALCIUM 5 MG/1
5 TABLET, COATED ORAL DAILY
Qty: 90 TABLET | Refills: 0 | OUTPATIENT
Start: 2024-09-05

## 2024-09-05 RX ORDER — LISINOPRIL 5 MG/1
5 TABLET ORAL DAILY
Qty: 90 TABLET | Refills: 0 | OUTPATIENT
Start: 2024-09-05

## 2024-09-13 DIAGNOSIS — I15.8 OTHER SECONDARY HYPERTENSION: ICD-10-CM

## 2024-09-13 DIAGNOSIS — E78.2 MIXED HYPERLIPIDEMIA: ICD-10-CM

## 2024-09-13 RX ORDER — LISINOPRIL 5 MG/1
5 TABLET ORAL DAILY
Qty: 90 TABLET | Refills: 0 | OUTPATIENT
Start: 2024-09-13

## 2024-09-13 RX ORDER — LISINOPRIL 5 MG/1
5 TABLET ORAL DAILY
Qty: 30 TABLET | Refills: 0 | Status: SHIPPED | OUTPATIENT
Start: 2024-09-13

## 2024-09-13 RX ORDER — ROSUVASTATIN CALCIUM 5 MG/1
5 TABLET, COATED ORAL DAILY
Qty: 30 TABLET | Refills: 0 | Status: SHIPPED | OUTPATIENT
Start: 2024-09-13

## 2024-09-30 ENCOUNTER — OFFICE VISIT (OUTPATIENT)
Dept: INTERNAL MEDICINE | Facility: CLINIC | Age: 69
End: 2024-09-30
Payer: MEDICARE

## 2024-09-30 ENCOUNTER — LAB (OUTPATIENT)
Dept: LAB | Facility: HOSPITAL | Age: 69
End: 2024-09-30
Payer: MEDICARE

## 2024-09-30 VITALS
BODY MASS INDEX: 29.57 KG/M2 | HEIGHT: 67 IN | OXYGEN SATURATION: 96 % | SYSTOLIC BLOOD PRESSURE: 148 MMHG | WEIGHT: 188.4 LBS | HEART RATE: 56 BPM | DIASTOLIC BLOOD PRESSURE: 74 MMHG

## 2024-09-30 DIAGNOSIS — N13.8 BENIGN PROSTATIC HYPERPLASIA WITH URINARY OBSTRUCTION: ICD-10-CM

## 2024-09-30 DIAGNOSIS — M54.42 ACUTE BILATERAL LOW BACK PAIN WITH BILATERAL SCIATICA: ICD-10-CM

## 2024-09-30 DIAGNOSIS — E55.9 VITAMIN D DEFICIENCY: ICD-10-CM

## 2024-09-30 DIAGNOSIS — N40.1 BENIGN PROSTATIC HYPERPLASIA WITH URINARY OBSTRUCTION: ICD-10-CM

## 2024-09-30 DIAGNOSIS — Z00.00 MEDICARE ANNUAL WELLNESS VISIT, SUBSEQUENT: Primary | ICD-10-CM

## 2024-09-30 DIAGNOSIS — E78.2 MIXED HYPERLIPIDEMIA: ICD-10-CM

## 2024-09-30 DIAGNOSIS — D64.9 ANEMIA, UNSPECIFIED TYPE: ICD-10-CM

## 2024-09-30 DIAGNOSIS — M54.41 ACUTE BILATERAL LOW BACK PAIN WITH BILATERAL SCIATICA: ICD-10-CM

## 2024-09-30 DIAGNOSIS — Z85.46 HISTORY OF PROSTATE CANCER: ICD-10-CM

## 2024-09-30 DIAGNOSIS — R00.1 SINUS BRADYCARDIA: ICD-10-CM

## 2024-09-30 DIAGNOSIS — I10 PRIMARY HYPERTENSION: ICD-10-CM

## 2024-09-30 DIAGNOSIS — Z13.29 SCREENING FOR HYPOTHYROIDISM: ICD-10-CM

## 2024-09-30 DIAGNOSIS — Z23 NEED FOR COVID-19 VACCINE: ICD-10-CM

## 2024-09-30 LAB
25(OH)D3 SERPL-MCNC: 20.9 NG/ML (ref 30–100)
ALBUMIN SERPL-MCNC: 4.4 G/DL (ref 3.5–5.2)
ALBUMIN/GLOB SERPL: 2.2 G/DL
ALP SERPL-CCNC: 63 U/L (ref 39–117)
ALT SERPL W P-5'-P-CCNC: 20 U/L (ref 1–41)
ANION GAP SERPL CALCULATED.3IONS-SCNC: 11.8 MMOL/L (ref 5–15)
AST SERPL-CCNC: 23 U/L (ref 1–40)
BILIRUB SERPL-MCNC: 0.3 MG/DL (ref 0–1.2)
BUN SERPL-MCNC: 13 MG/DL (ref 8–23)
BUN/CREAT SERPL: 17.1 (ref 7–25)
CALCIUM SPEC-SCNC: 9.6 MG/DL (ref 8.6–10.5)
CHLORIDE SERPL-SCNC: 105 MMOL/L (ref 98–107)
CHOLEST SERPL-MCNC: 173 MG/DL (ref 0–200)
CO2 SERPL-SCNC: 25.2 MMOL/L (ref 22–29)
CREAT SERPL-MCNC: 0.76 MG/DL (ref 0.76–1.27)
DEPRECATED RDW RBC AUTO: 40.6 FL (ref 37–54)
EGFRCR SERPLBLD CKD-EPI 2021: 96.1 ML/MIN/1.73
ERYTHROCYTE [DISTWIDTH] IN BLOOD BY AUTOMATED COUNT: 12 % (ref 12.3–15.4)
GLOBULIN UR ELPH-MCNC: 2 GM/DL
GLUCOSE SERPL-MCNC: 103 MG/DL (ref 65–99)
HCT VFR BLD AUTO: 37.2 % (ref 37.5–51)
HDLC SERPL-MCNC: 45 MG/DL (ref 40–60)
HGB BLD-MCNC: 12.9 G/DL (ref 13–17.7)
LDLC SERPL CALC-MCNC: 99 MG/DL (ref 0–100)
LDLC/HDLC SERPL: 2.1 {RATIO}
MCH RBC QN AUTO: 32.1 PG (ref 26.6–33)
MCHC RBC AUTO-ENTMCNC: 34.7 G/DL (ref 31.5–35.7)
MCV RBC AUTO: 92.5 FL (ref 79–97)
PLATELET # BLD AUTO: 211 10*3/MM3 (ref 140–450)
PMV BLD AUTO: 10 FL (ref 6–12)
POTASSIUM SERPL-SCNC: 4.3 MMOL/L (ref 3.5–5.2)
PROT SERPL-MCNC: 6.4 G/DL (ref 6–8.5)
RBC # BLD AUTO: 4.02 10*6/MM3 (ref 4.14–5.8)
SODIUM SERPL-SCNC: 142 MMOL/L (ref 136–145)
TRIGL SERPL-MCNC: 168 MG/DL (ref 0–150)
TSH SERPL DL<=0.05 MIU/L-ACNC: 2.25 UIU/ML (ref 0.27–4.2)
VLDLC SERPL-MCNC: 29 MG/DL (ref 5–40)
WBC NRBC COR # BLD AUTO: 5.83 10*3/MM3 (ref 3.4–10.8)

## 2024-09-30 PROCEDURE — G0439 PPPS, SUBSEQ VISIT: HCPCS | Performed by: NURSE PRACTITIONER

## 2024-09-30 PROCEDURE — 1170F FXNL STATUS ASSESSED: CPT | Performed by: NURSE PRACTITIONER

## 2024-09-30 PROCEDURE — 85027 COMPLETE CBC AUTOMATED: CPT | Performed by: NURSE PRACTITIONER

## 2024-09-30 PROCEDURE — 91320 SARSCV2 VAC 30MCG TRS-SUC IM: CPT | Performed by: NURSE PRACTITIONER

## 2024-09-30 PROCEDURE — 3078F DIAST BP <80 MM HG: CPT | Performed by: NURSE PRACTITIONER

## 2024-09-30 PROCEDURE — 80061 LIPID PANEL: CPT | Performed by: NURSE PRACTITIONER

## 2024-09-30 PROCEDURE — 90480 ADMN SARSCOV2 VAC 1/ONLY CMP: CPT | Performed by: NURSE PRACTITIONER

## 2024-09-30 PROCEDURE — 96160 PT-FOCUSED HLTH RISK ASSMT: CPT | Performed by: NURSE PRACTITIONER

## 2024-09-30 PROCEDURE — 3077F SYST BP >= 140 MM HG: CPT | Performed by: NURSE PRACTITIONER

## 2024-09-30 PROCEDURE — 1126F AMNT PAIN NOTED NONE PRSNT: CPT | Performed by: NURSE PRACTITIONER

## 2024-09-30 PROCEDURE — 80053 COMPREHEN METABOLIC PANEL: CPT | Performed by: NURSE PRACTITIONER

## 2024-09-30 PROCEDURE — 84443 ASSAY THYROID STIM HORMONE: CPT | Performed by: NURSE PRACTITIONER

## 2024-09-30 PROCEDURE — 36415 COLL VENOUS BLD VENIPUNCTURE: CPT | Performed by: NURSE PRACTITIONER

## 2024-09-30 PROCEDURE — 82306 VITAMIN D 25 HYDROXY: CPT | Performed by: NURSE PRACTITIONER

## 2024-09-30 RX ORDER — ROSUVASTATIN CALCIUM 5 MG/1
5 TABLET, COATED ORAL DAILY
Qty: 90 TABLET | Refills: 3 | Status: SHIPPED | OUTPATIENT
Start: 2024-09-30

## 2024-09-30 RX ORDER — LISINOPRIL 5 MG/1
5 TABLET ORAL DAILY
Qty: 90 TABLET | Refills: 3 | Status: SHIPPED | OUTPATIENT
Start: 2024-09-30

## 2024-09-30 NOTE — PROGRESS NOTES
The ABCs of the Annual Wellness Visit  Subsequent Medicare Wellness Visit    Chief Complaint   Patient presents with   • Medicare Wellness-subsequent      Subjective    History of Present Illness:  Russell Soto is a 69 y.o. male who presents for a Subsequent Medicare Wellness Visit.     The following portions of the patient's history were reviewed and   updated as appropriate: allergies, current medications, past family history, past medical history, past social history, past surgical history, and problem list.    Compared to one year ago, the patient feels his physical   health is the same.    Compared to one year ago, the patient feels his mental   health is the same.    Recent Hospitalizations:  He was not admitted to the hospital during the last year.       Current Medical Providers:  Patient Care Team:  Berenice Huntley APRN as PCP - General (Internal Medicine)  Sheryl Cevallos PA as Referring Physician (Internal Medicine)    Outpatient Medications Prior to Visit   Medication Sig Dispense Refill   • ferrous fumarate-b12-vitamic C-folic acid (FOLTRIN) capsule Take 1 capsule by mouth Every Morning Before Breakfast.     • lisinopril (PRINIVIL,ZESTRIL) 5 MG tablet Take 1 tablet by mouth Daily. 30 tablet 0   • rosuvastatin (CRESTOR) 5 MG tablet Take 1 tablet by mouth Daily. 30 tablet 0   • vitamin D (ERGOCALCIFEROL) 1.25 MG (44045 UT) capsule capsule Take 1 capsule by mouth 1 (One) Time Per Week. 12 capsule 3     No facility-administered medications prior to visit.       No opioid medication identified on active medication list. I have reviewed chart for other potential  high risk medication/s and harmful drug interactions in the elderly.        Aspirin is not on active medication list.  Aspirin use is not indicated based on review of current medical condition/s. Risk of harm outweighs potential benefits.  .    Patient Active Problem List   Diagnosis   • Sinus bradycardia   • Benign prostatic hyperplasia with  "urinary obstruction   • Anemia   • Palpitations   • Health care maintenance   • Hyperlipidemia   • Acute bilateral low back pain with bilateral sciatica   • Hypertension   • Lumbar stenosis with neurogenic claudication   • History of prostate cancer     Advance Care Planning  Advance Directive is on file.  ACP discussion was held with the patient during this visit. Patient has an advance directive in EMR which is still valid.           Objective    Vitals:    09/30/24 0822   BP: 148/74   Pulse: 56   SpO2: 96%   Weight: 85.5 kg (188 lb 6.4 oz)   Height: 170.2 cm (67\")   PainSc: 0-No pain     Estimated body mass index is 29.51 kg/m² as calculated from the following:    Height as of this encounter: 170.2 cm (67\").    Weight as of this encounter: 85.5 kg (188 lb 6.4 oz).    BMI is >= 25 and <30. (Overweight) The following options were offered after discussion;: exercise counseling/recommendations and nutrition counseling/recommendations      Does the patient have evidence of cognitive impairment? Yes    Physical Exam  Constitutional:       Appearance: Normal appearance.   HENT:      Head: Normocephalic and atraumatic.      Nose: Nose normal.   Eyes:      Extraocular Movements: Extraocular movements intact.      Conjunctiva/sclera: Conjunctivae normal.      Pupils: Pupils are equal, round, and reactive to light.   Cardiovascular:      Rate and Rhythm: Normal rate. Bradycardia present.   Pulmonary:      Effort: Pulmonary effort is normal. No respiratory distress.      Breath sounds: Normal breath sounds.   Musculoskeletal:         General: Normal range of motion.      Cervical back: Normal range of motion and neck supple.      Right lower leg: No edema.      Left lower leg: No edema.   Skin:     General: Skin is warm and dry.   Neurological:      General: No focal deficit present.      Mental Status: He is alert and oriented to person, place, and time. Mental status is at baseline.   Psychiatric:         Mood and Affect: " Mood normal.         Behavior: Behavior normal.         Thought Content: Thought content normal.         Judgment: Judgment normal.     Lab Results   Component Value Date    TRIG 168 (H) 2024    HDL 45 2024    LDL 99 2024    VLDL 29 2024            HEALTH RISK ASSESSMENT    Smoking Status:  Social History     Tobacco Use   Smoking Status Former   • Current packs/day: 0.00   • Average packs/day: 1 pack/day for 30.0 years (30.0 ttl pk-yrs)   • Types: Cigarettes   • Start date: 1967   • Quit date: 1997   • Years since quittin.0   Smokeless Tobacco Never     Alcohol Consumption:  Social History     Substance and Sexual Activity   Alcohol Use Yes    Comment: Social     Fall Risk Screen:    STEADI Fall Risk Assessment was completed, and patient is at LOW risk for falls.Assessment completed on:2024    Depression Screenin/30/2024     8:30 AM   PHQ-2/PHQ-9 Depression Screening   Little Interest or Pleasure in Doing Things 0-->not at all   Feeling Down, Depressed or Hopeless 0-->not at all   PHQ-9: Brief Depression Severity Measure Score 0       Health Habits and Functional and Cognitive Screenin/30/2024     8:28 AM   Functional & Cognitive Status   Do you have difficulty preparing food and eating? No   Do you have difficulty bathing yourself, getting dressed or grooming yourself? No   Do you have difficulty using the toilet? No   Do you have difficulty moving around from place to place? No   Do you have trouble with steps or getting out of a bed or a chair? No   Current Diet Well Balanced Diet   Dental Exam Up to date   Eye Exam Up to date   Exercise (times per week) 7 times per week   Current Exercises Include Walking;Other   Do you need help using the phone?  No   Are you deaf or do you have serious difficulty hearing?  No   Do you need help to go to places out of walking distance? No   Do you need help shopping? No   Do you need help preparing meals?  No   Do  you need help with housework?  No   Do you need help with laundry? No   Do you need help taking your medications? No   Do you need help managing money? No   Do you ever drive or ride in a car without wearing a seat belt? No   Have you felt unusual stress, anger or loneliness in the last month? No   Who do you live with? Spouse   If you need help, do you have trouble finding someone available to you? No   Have you been bothered in the last four weeks by sexual problems? No   Do you have difficulty concentrating, remembering or making decisions? No       Age-appropriate Screening Schedule:  Refer to the list below for future screening recommendations based on patient's age, sex and/or medical conditions. Orders for these recommended tests are listed in the plan section. The patient has been provided with a written plan.    Health Maintenance   Topic Date Due   • BMI FOLLOWUP  09/16/2023   • ANNUAL WELLNESS VISIT  09/30/2025   • LIPID PANEL  09/30/2025   • TDAP/TD VACCINES (2 - Td or Tdap) 07/25/2026   • HEPATITIS C SCREENING  Completed   • COVID-19 Vaccine  Completed   • Pneumococcal Vaccine 65+  Completed   • AAA SCREEN (ONE-TIME)  Completed   • ZOSTER VACCINE  Addressed   • INFLUENZA VACCINE  Discontinued   • COLORECTAL CANCER SCREENING  Discontinued              Assessment & Plan   CMS Preventative Services Quick Reference  Risk Factors Identified During Encounter  None Identified  The above risks/problems have been discussed with the patient.  Follow up actions/plans if indicated are seen below in the Assessment/Plan Section.  Pertinent information has been shared with the patient in the After Visit Summary.    Diagnoses and all orders for this visit:    1. Medicare annual wellness visit, subsequent (Primary)  -     CBC (No Diff)  -     Comprehensive metabolic panel  -     TSH  -     Lipid Panel  -     Vitamin D,25-Hydroxy    2. Acute bilateral low back pain with bilateral sciatica    3. Anemia, unspecified  type  -     CBC (No Diff)    4. Benign prostatic hyperplasia with urinary obstruction    5. History of prostate cancer    6. Mixed hyperlipidemia  -     Lipid Panel  -     rosuvastatin (CRESTOR) 5 MG tablet; Take 1 tablet by mouth Daily.  Dispense: 90 tablet; Refill: 3    7. Primary hypertension  -     CBC (No Diff)  -     Comprehensive metabolic panel  -     lisinopril (PRINIVIL,ZESTRIL) 5 MG tablet; Take 1 tablet by mouth Daily.  Dispense: 90 tablet; Refill: 3    8. Sinus bradycardia    9. Screening for hypothyroidism  -     TSH    10. Vitamin D deficiency  -     Vitamin D,25-Hydroxy    11. Need for COVID-19 vaccine  -     COVID-19 (Pfizer) 12yrs+ (COMIRNATY)    PSA in January with Urology.    Follow Up:   Return in about 1 year (around 9/30/2025) for Medicare Wellness.     An After Visit Summary and PPPS were made available to the patient.             Part of this note may be an electronic transcription/translation of spoken language to printed text using the Dragon Dictation System.

## 2025-06-02 ENCOUNTER — OFFICE VISIT (OUTPATIENT)
Dept: INTERNAL MEDICINE | Facility: CLINIC | Age: 70
End: 2025-06-02
Payer: MEDICARE

## 2025-06-02 VITALS
TEMPERATURE: 97.5 F | OXYGEN SATURATION: 99 % | WEIGHT: 182.6 LBS | BODY MASS INDEX: 28.66 KG/M2 | DIASTOLIC BLOOD PRESSURE: 62 MMHG | SYSTOLIC BLOOD PRESSURE: 138 MMHG | HEIGHT: 67 IN | HEART RATE: 60 BPM

## 2025-06-02 DIAGNOSIS — M53.9 ACUTE LOW BACK PAIN DUE TO SPINAL DISORDER: Primary | ICD-10-CM

## 2025-06-02 DIAGNOSIS — M54.50 ACUTE LOW BACK PAIN DUE TO SPINAL DISORDER: Primary | ICD-10-CM

## 2025-06-02 PROCEDURE — 1159F MED LIST DOCD IN RCRD: CPT | Performed by: INTERNAL MEDICINE

## 2025-06-02 PROCEDURE — 1160F RVW MEDS BY RX/DR IN RCRD: CPT | Performed by: INTERNAL MEDICINE

## 2025-06-02 PROCEDURE — 1125F AMNT PAIN NOTED PAIN PRSNT: CPT | Performed by: INTERNAL MEDICINE

## 2025-06-02 PROCEDURE — 3075F SYST BP GE 130 - 139MM HG: CPT | Performed by: INTERNAL MEDICINE

## 2025-06-02 PROCEDURE — 3078F DIAST BP <80 MM HG: CPT | Performed by: INTERNAL MEDICINE

## 2025-06-02 PROCEDURE — 99213 OFFICE O/P EST LOW 20 MIN: CPT | Performed by: INTERNAL MEDICINE

## 2025-06-02 RX ORDER — METHYLPREDNISOLONE 4 MG/1
TABLET ORAL
Qty: 21 EACH | Refills: 0 | Status: SHIPPED | OUTPATIENT
Start: 2025-06-02

## 2025-06-02 NOTE — PROGRESS NOTES
"Kaiser Soto is a 70 y.o. male.   Chief Complaint   Patient presents with    Back Pain     FOR THE LAST WEEK, LOWER BACK    Hip Pain       History of Present Illness   Lower back pain that radiates to  right thigh. Some numbness. Was doing yard work prior to pain. Has hx spinal stenosis with surgery about 8 years ago. Took ibuprofen.  The following portions of the patient's history were reviewed and updated as appropriate: allergies, current medications, past family history, past medical history, past social history, past surgical history, and problem list.    Review of Systems   Constitutional:  Negative for fatigue and fever.   Musculoskeletal:  Positive for back pain.     /62 (BP Location: Left arm, Patient Position: Sitting, Cuff Size: Adult)   Pulse 60   Temp 97.5 °F (36.4 °C) (Temporal)   Ht 170 cm (66.93\")   Wt 82.8 kg (182 lb 9.6 oz)   SpO2 99%   BMI 28.66 kg/m²     Objective   Physical Exam  Vitals reviewed.   Musculoskeletal:      Comments: Some pain when  right leg moved forward         Assessment & Plan   Diagnoses and all orders for this visit:    1. Acute low back pain due to spinal disorder (Primary)  -     methylPREDNISolone (MEDROL) 4 MG dose pack; Take as directed on package instructions.  Dispense: 21 each; Refill: 0  1 week f/u with his PCP               "

## 2025-06-04 ENCOUNTER — TELEPHONE (OUTPATIENT)
Dept: INTERNAL MEDICINE | Facility: CLINIC | Age: 70
End: 2025-06-04
Payer: MEDICARE

## 2025-06-04 NOTE — TELEPHONE ENCOUNTER
Hub to relay    Left detailed message with patient notifying him that its ok to take Tylenol with the steroid but not Ibuprofen. Office number given.

## 2025-06-04 NOTE — TELEPHONE ENCOUNTER
Caller: CharlesRussell keating Maxwell    Relationship: Self    Best call back number: [phone]     What is the best time to reach you: ANYTIME AND CAN LEAVE A DETAILED VOICE MESSAGE     Who are you requesting to speak with (clinical staff, provider,  specific staff member): CLINICAL STAFF    Do you know the name of the person who called: THE PATIENT RUSSELL     What was the call regarding: THE PATIENT RUSSELL REPORTS HE SAW DR HOLCOMB FOR BACK PAIN ON 06/02/2025 AND WAS PRESCRIBED A STEROID. THE PATIENT REPORTS THE STEROID IS HELPING A LITTLE, BUT WANTS TO KNOW IF HE CAN ALSO TAKE ACETAMINOPHEN AND/OR IBUPROFEN ALONG WITH THE STEROID. PLEASE CALL AND ADVISE.     Is it okay if the provider responds through [facility]: PLEASE CALL

## 2025-06-06 ENCOUNTER — OFFICE VISIT (OUTPATIENT)
Dept: INTERNAL MEDICINE | Facility: CLINIC | Age: 70
End: 2025-06-06
Payer: MEDICARE

## 2025-06-06 VITALS
TEMPERATURE: 97.2 F | HEART RATE: 62 BPM | DIASTOLIC BLOOD PRESSURE: 72 MMHG | SYSTOLIC BLOOD PRESSURE: 134 MMHG | WEIGHT: 181 LBS | HEIGHT: 67 IN | BODY MASS INDEX: 28.41 KG/M2 | OXYGEN SATURATION: 99 %

## 2025-06-06 DIAGNOSIS — Z98.890 HISTORY OF SPINAL SURGERY: ICD-10-CM

## 2025-06-06 DIAGNOSIS — M54.41 ACUTE RIGHT-SIDED LOW BACK PAIN WITH RIGHT-SIDED SCIATICA: ICD-10-CM

## 2025-06-06 DIAGNOSIS — M25.561 ACUTE PAIN OF RIGHT KNEE: ICD-10-CM

## 2025-06-06 DIAGNOSIS — M48.062 LUMBAR STENOSIS WITH NEUROGENIC CLAUDICATION: Primary | ICD-10-CM

## 2025-06-06 DIAGNOSIS — M25.551 RIGHT HIP PAIN: ICD-10-CM

## 2025-06-06 PROCEDURE — 99214 OFFICE O/P EST MOD 30 MIN: CPT | Performed by: NURSE PRACTITIONER

## 2025-06-06 PROCEDURE — G2211 COMPLEX E/M VISIT ADD ON: HCPCS | Performed by: NURSE PRACTITIONER

## 2025-06-06 PROCEDURE — 1125F AMNT PAIN NOTED PAIN PRSNT: CPT | Performed by: NURSE PRACTITIONER

## 2025-06-06 PROCEDURE — 3078F DIAST BP <80 MM HG: CPT | Performed by: NURSE PRACTITIONER

## 2025-06-06 PROCEDURE — 3075F SYST BP GE 130 - 139MM HG: CPT | Performed by: NURSE PRACTITIONER

## 2025-06-06 RX ORDER — PREDNISONE 10 MG/1
10 TABLET ORAL DAILY
Qty: 10 TABLET | Refills: 0 | Status: SHIPPED | OUTPATIENT
Start: 2025-06-06

## 2025-06-06 NOTE — PROGRESS NOTES
Office Note     Name: Russell Soto    : 1955     MRN: [id number]     Chief Complaint  Back Pain    Subjective     History of Present Illness:  Russell Soto is a 70 y.o. male     History of Present Illness  The patient presents for a follow-up on back pain.  He was seen in this clinic by Dr. Moraes last week for evaluation of back pain.    He reports experiencing back pain following yard work, which involved pulling weeds and digging. The pain is similar to what he experienced before his spine surgery 8 years ago. He speculates that he may have aggravated a pre-existing condition or possibly herniated a disc. Initially, the pain was localized to the dimple of his hip but has since radiated to his knee, with shooting pains up and down his thigh. He reports no pain below the knee. He also notes numbness in his knee and severe pain under the kneecap, but not within the joint. He recalls that 8 years ago, the pain would originate from either side and radiate down to his ankle, a symptom he does not currently experience. He is aware of a titanium marivel in his tibia and questions if it could be contributing to his symptoms. The pain is intermittent and varies in intensity, prompting frequent changes in position. Over the past few weeks, he has spent most of his time on the floor, rolling back and forth.    Currently, he is managing the pain with 1000 mg of Tylenol and prednisone, a combination he finds tolerable. He sought an earlier appointment due to his prednisone supply depleting tomorrow. Initially, he was on escalating doses of ibuprofen but switched to prednisone after a few days, which provided some relief. However, he was unable to continue ibuprofen concurrently. The addition of Tylenol made the pain more bearable. He expresses concern about managing the pain without either medication. He has not undergone any imaging studies this time. He reports that the prednisone was ineffective on the  first day but provided relief on the second day, although the pain remained present but manageable. On the third day, the relief was insufficient, prompting him to start Tylenol. Despite the tapering of prednisone, the combination of Tylenol and prednisone has been effective. He is not fully functional but can manage daily activities such as using the bathroom and preparing coffee. He is hesitant to try other treatments. He is considering continuing with Tylenol and prednisone if they provide sufficient relief on Sunday and Sunday night but would like an alternative if necessary. He is taking two 500 mg tablets of Tylenol every 6 hours.     He underwent spine surgery in 10/2017 for a lateral disc bulge at L4-L5, which was impinging on the nerve root at L4 and causing spinal canal narrowing. The surgery was complicated by a dural puncture, which required repair. He was informed that the surgery would provide relief for approximately 5 years. He recalls undergoing physical therapy for 6 weeks 8 years ago, which he found excruciating. In 2023, he experienced a flare-up and resumed his physical therapy exercises, which he found beneficial. However, he has not been performing these exercises for the past 12 days.    PAST SURGICAL HISTORY:  - Spine surgery in 10/2017 for a lateral disc bulge at L4-L5 with dural puncture repair.  - Titanium marivel placement in tibia (date not specified).      Past Medical History:   Diagnosis Date    Abnormal ECG October 2021    Holter results    Arrhythmia October 2021    Holter results    Benign prostatic hyperplasia April, 2016    Had TURP surgery    Bradycardia     At rest     Cancer     Prostate cancer     Dizziness     Hyperlipidemia     Hypertension     Low back pain 2017    Had surgery for spinal stenosis    Rectal hemorrhage     Urinary tract infection 2016    Associated with prostate condition       Past Surgical History:   Procedure Laterality Date    COLONOSCOPY      FRACTURE  "SURGERY      right tibia; marivel inserted    LAMINECTOMY AND MICRODISCECTOMY LUMBAR SPINE N/A 10/04/2017    Procedure: LUMBAR LAMINECTOMY  L2, L3, L4, POSSIBLE DISCECTOMY;  Surgeon: Romeo Angeles MD;  Location: [facility];  Service:     PROSTATE SURGERY  2016    SPINE SURGERY  2017    TURP / TRANSURETHRAL INCISION / DRAINAGE PROSTATE         Social History     Socioeconomic History    Marital status:    Tobacco Use    Smoking status: Former     Current packs/day: 0.00     Average packs/day: 1 pack/day for 30.0 years (30.0 ttl pk-yrs)     Types: Cigarettes     Start date: 1967     Quit date: 1997     Years since quittin.7    Smokeless tobacco: Never   Vaping Use    Vaping status: Never Used   Substance and Sexual Activity    Alcohol use: Yes     Comment: Social    Drug use: No    Sexual activity: Defer         Current Outpatient Medications:     Cholecalciferol (D3 PO), Take  by mouth., Disp: , Rfl:     Cyanocobalamin (B-12 PO), Take  by mouth., Disp: , Rfl:     lisinopril (PRINIVIL,ZESTRIL) 5 MG tablet, Take 1 tablet by mouth Daily., Disp: 90 tablet, Rfl: 3    methylPREDNISolone (MEDROL) 4 MG dose pack, Take as directed on package instructions., Disp: 21 each, Rfl: 0    rosuvastatin (CRESTOR) 5 MG tablet, Take 1 tablet by mouth Daily., Disp: 90 tablet, Rfl: 3    predniSONE (DELTASONE) 10 MG tablet, Take 1 tablet by mouth Daily., Disp: 10 tablet, Rfl: 0    Objective     Vital Signs  /72   Pulse 62   Temp 97.2 °F (36.2 °C)   Ht 170.2 cm (67\")   Wt 82.1 kg (181 lb)   SpO2 99%   BMI 28.35 kg/m²   Estimated body mass index is 28.35 kg/m² as calculated from the following:    Height as of this encounter: 170.2 cm (67\").    Weight as of this encounter: 82.1 kg (181 lb).           Physical Exam  Constitutional:       Appearance: Normal appearance.   HENT:      Head: Normocephalic and atraumatic.      Nose: Nose normal.   Eyes:      Extraocular Movements: Extraocular movements intact. "      Conjunctiva/sclera: Conjunctivae normal.      Pupils: Pupils are equal, round, and reactive to light.   Cardiovascular:      Rate and Rhythm: Normal rate.   Pulmonary:      Effort: Pulmonary effort is normal. No respiratory distress.   Musculoskeletal:      Cervical back: Normal range of motion and neck supple.      Lumbar back: Tenderness present. Decreased range of motion.   Skin:     General: Skin is warm and dry.   Neurological:      General: No focal deficit present.      Mental Status: He is alert and oriented to person, place, and time. Mental status is at baseline.   Psychiatric:         Mood and Affect: Mood normal.         Behavior: Behavior normal.         Thought Content: Thought content normal.         Judgment: Judgment normal.          Assessment and Plan     Diagnoses and all orders for this visit:    1. Lumbar stenosis with neurogenic claudication (Primary)  -     predniSONE (DELTASONE) 10 MG tablet; Take 1 tablet by mouth Daily.  Dispense: 10 tablet; Refill: 0  -     XR Spine Lumbar 2 or 3 View; Future    2. Acute right-sided low back pain with right-sided sciatica  -     predniSONE (DELTASONE) 10 MG tablet; Take 1 tablet by mouth Daily.  Dispense: 10 tablet; Refill: 0  -     XR Spine Lumbar 2 or 3 View; Future    3. History of spinal surgery  -     XR Spine Lumbar 2 or 3 View; Future    4. Right hip pain  -     predniSONE (DELTASONE) 10 MG tablet; Take 1 tablet by mouth Daily.  Dispense: 10 tablet; Refill: 0  -     XR Hip With or Without Pelvis 2 - 3 View Right; Future    5. Acute pain of right knee  -     predniSONE (DELTASONE) 10 MG tablet; Take 1 tablet by mouth Daily.  Dispense: 10 tablet; Refill: 0  -     XR Knee 3 View Right; Future        Assessment & Plan  1. Back pain.  - He reports experiencing back pain similar to what he felt before his spine surgery 8 years ago. The pain is localized in the dimple of his hip and has recently spread to his knee with shooting pains up and down the  thigh.  - He has been managing the pain with 1000 mg of Tylenol and prednisone, which has been tolerable. However, his prednisone supply will run out tomorrow.  - He was advised to continue taking Tylenol 500 mg every 6 hours as needed but not to exceed 4 doses in a 24-hour period. A prescription for prednisone 10 mg once daily as needed was provided and sent to pharmacy.  - An x-ray of the back, hip, and knee was ordered to further investigate the cause of his pain. If the x-ray results indicate any abnormalities, a referral to Dr. Angeles will be considered for further evaluation.      Follow Up  Return if symptoms worsen or fail to improve, for Next scheduled follow up.    Patient or patient representative verbalized consent for the use of Ambient Listening during the visit with  WALLY Rodriguez for chart documentation. 6/9/2025  11:30 EDT      WALLY Rodriguez    Part of this note may be an electronic transcription/translation of spoken language to printed text using the Dragon Dictation System.  Answers submitted by the patient for this visit:  Back Pain Questionnaire (Submitted on 6/5/2025)  Chief Complaint: Back pain  Chronicity: recurrent  Onset: 1 to 4 weeks ago  Frequency: constantly  Progression since onset: coming and going  Pain quality: aching, burning, shooting, stabbing  Radiates to: right knee, right buttock  Pain - numeric: 7/10  Pain is: the same all the time  Aggravated by: lying down, sitting, standing  abdominal pain: No  bladder incontinence: No  bowel incontinence: No  chest pain: No  dysuria: No  fever: No  leg pain: Yes  numbness: No  paresthesias: No  pelvic pain: No  perianal numbness: No  tingling: No  weakness: No  weight loss: No  Additional Information: May be related to 2007 leg surgery or 2017 spinal surgery.

## 2025-06-09 ENCOUNTER — HOSPITAL ENCOUNTER (OUTPATIENT)
Dept: GENERAL RADIOLOGY | Facility: HOSPITAL | Age: 70
Discharge: HOME | End: 2025-06-09
Admitting: NURSE PRACTITIONER
Payer: MEDICARE

## 2025-06-09 DIAGNOSIS — M25.561 ACUTE PAIN OF RIGHT KNEE: ICD-10-CM

## 2025-06-09 DIAGNOSIS — M25.551 RIGHT HIP PAIN: ICD-10-CM

## 2025-06-09 DIAGNOSIS — M48.062 LUMBAR STENOSIS WITH NEUROGENIC CLAUDICATION: ICD-10-CM

## 2025-06-09 DIAGNOSIS — M54.41 ACUTE RIGHT-SIDED LOW BACK PAIN WITH RIGHT-SIDED SCIATICA: ICD-10-CM

## 2025-06-09 DIAGNOSIS — Z98.890 HISTORY OF SPINAL SURGERY: ICD-10-CM

## 2025-06-09 PROCEDURE — 73502 X-RAY EXAM HIP UNI 2-3 VIEWS: CPT

## 2025-06-09 PROCEDURE — 72100 X-RAY EXAM L-S SPINE 2/3 VWS: CPT

## 2025-06-09 PROCEDURE — 73562 X-RAY EXAM OF KNEE 3: CPT

## (undated) DEVICE — DRSNG WND BORDR/ADHS NONADHR/GZ LF 4X4IN STRL

## (undated) DEVICE — HDRST INTUB GENTLETOUCH SLOT 7IN RT

## (undated) DEVICE — SOL LR 1000ML

## (undated) DEVICE — ELECTRD BLD EXT EDGE/INSUL 6IN

## (undated) DEVICE — 3M™ STERI-DRAPE™ INSTRUMENT POUCH 1018: Brand: STERI-DRAPE™

## (undated) DEVICE — APPL CHLORAPREP W/TINT 26ML BLU

## (undated) DEVICE — GLV SURG BIOGEL LTX PF 8

## (undated) DEVICE — COVADERM: Brand: DEROYAL

## (undated) DEVICE — C-ARM: Brand: UNBRANDED

## (undated) DEVICE — ADHS LIQ MASTISOL 2/3ML

## (undated) DEVICE — ENCORE® LATEX MICRO SIZE 8, STERILE LATEX POWDER-FREE SURGICAL GLOVE: Brand: ENCORE

## (undated) DEVICE — SYR CONTRL LUERLOK 10CC

## (undated) DEVICE — NDL SPINE 22G 31/2IN BLK

## (undated) DEVICE — DRP MICROSCOP ELIMINATES GLAS COVR

## (undated) DEVICE — PK NEURO DISC 10

## (undated) DEVICE — TOOL 14BA60 LEGEND 14CM 6MM: Brand: MIDAS REX ™

## (undated) DEVICE — 3M™ STERI-STRIP™ REINFORCED ADHESIVE SKIN CLOSURES, R1547, 1/2 IN X 4 IN (12 MM X 100 MM), 6 STRIPS/ENVELOPE: Brand: 3M™ STERI-STRIP™

## (undated) DEVICE — SUT VIC 0 UR6 27IN VCP603H

## (undated) DEVICE — PAD GRND REM POLYHESIVE A/ DISP

## (undated) DEVICE — SUT VIC 3/0 SH CR8 18IN J864D

## (undated) DEVICE — MAGNETIC DRAPE: Brand: DEVON

## (undated) DEVICE — 3M™ STERI-STRIP™ ANTIMICROBIAL SKIN CLOSURES 1/2 INCH X 4 INCHES 50/CARTON 4 CARTONS/CASE A1847: Brand: 3M™ STERI-STRIP™

## (undated) DEVICE — TUBING, SUCTION, 1/4" X 10', STRAIGHT: Brand: MEDLINE

## (undated) DEVICE — TOOL 14MH30 LEGEND 14CM 3MM: Brand: MIDAS REX ™

## (undated) DEVICE — SPNG GZ WOVN 4X4IN 12PLY 10/BX STRL

## (undated) DEVICE — SOL ISO/ALC RUB 91PCT 16OZ

## (undated) DEVICE — NDL HYPO ECLPS SFTY 25G 1 1/2IN

## (undated) DEVICE — PAD ARMBRD SURG CONVOL 7.5X20X2IN

## (undated) DEVICE — MEDI-VAC YANKAUER SUCTION HANDLE W/BULBOUS TIP: Brand: CARDINAL HEALTH

## (undated) DEVICE — ULTRACLEAN ACCESSORY ELECTRODE 6" (15.24 CM) COATED BLADE: Brand: ULTRACLEAN

## (undated) DEVICE — AIRWY SZ11

## (undated) DEVICE — CANNULA,ADULT,SOFT-TOUCH,7TUBE,SC: Brand: MEDLINE

## (undated) DEVICE — ADAPT ST INFUS ADMIN SYR 70IN

## (undated) DEVICE — SUT PROLN 6/0 BV1 D/A 30IN 8709H

## (undated) DEVICE — TOOL T12MH25M LEGEND 12CM 2.5MM MH 2FLT: Brand: MIDAS REX ™

## (undated) DEVICE — RUBBERBAND LF STRL PK/2

## (undated) DEVICE — DISPOSABLE BIPOLAR FORCEPS 7 3/4" (19.7CM) SCOVILLE BAYONET, INSULATED, 1.5MM TIP AND 12 FT. (3.6M) CABLE: Brand: KIRWAN

## (undated) DEVICE — MEDI-VAC NON-CONDUCTIVE SUCTION TUBING: Brand: CARDINAL HEALTH

## (undated) DEVICE — SOL CHLORHEXIDINE 4% CHG GLUCONATE 4OZ